# Patient Record
Sex: FEMALE | Race: WHITE | NOT HISPANIC OR LATINO | Employment: FULL TIME | URBAN - METROPOLITAN AREA
[De-identification: names, ages, dates, MRNs, and addresses within clinical notes are randomized per-mention and may not be internally consistent; named-entity substitution may affect disease eponyms.]

---

## 2020-07-17 ENCOUNTER — OFFICE VISIT (OUTPATIENT)
Dept: FAMILY MEDICINE CLINIC | Facility: CLINIC | Age: 50
End: 2020-07-17

## 2020-07-17 VITALS
HEIGHT: 63 IN | HEART RATE: 90 BPM | TEMPERATURE: 97.7 F | DIASTOLIC BLOOD PRESSURE: 88 MMHG | OXYGEN SATURATION: 99 % | WEIGHT: 263 LBS | SYSTOLIC BLOOD PRESSURE: 132 MMHG | BODY MASS INDEX: 46.6 KG/M2

## 2020-07-17 DIAGNOSIS — M51.16 LUMBAR DISC DISEASE WITH RADICULOPATHY: ICD-10-CM

## 2020-07-17 DIAGNOSIS — Z00.00 ANNUAL PHYSICAL EXAM: Primary | ICD-10-CM

## 2020-07-17 DIAGNOSIS — K21.9 GASTROESOPHAGEAL REFLUX DISEASE, ESOPHAGITIS PRESENCE NOT SPECIFIED: ICD-10-CM

## 2020-07-17 DIAGNOSIS — Z12.11 SCREENING FOR MALIGNANT NEOPLASM OF COLON: ICD-10-CM

## 2020-07-17 DIAGNOSIS — R73.9 HYPERGLYCEMIA: ICD-10-CM

## 2020-07-17 DIAGNOSIS — R53.83 FATIGUE, UNSPECIFIED TYPE: ICD-10-CM

## 2020-07-17 DIAGNOSIS — F41.9 ANXIETY: ICD-10-CM

## 2020-07-17 DIAGNOSIS — E55.9 VITAMIN D INSUFFICIENCY: ICD-10-CM

## 2020-07-17 DIAGNOSIS — E66.01 MORBID OBESITY WITH BMI OF 45.0-49.9, ADULT (HCC): ICD-10-CM

## 2020-07-17 DIAGNOSIS — Z11.59 NEED FOR HEPATITIS C SCREENING TEST: ICD-10-CM

## 2020-07-17 DIAGNOSIS — F33.42 RECURRENT MAJOR DEPRESSIVE DISORDER, IN FULL REMISSION (HCC): ICD-10-CM

## 2020-07-17 PROBLEM — R73.03 PREDIABETES: Status: ACTIVE | Noted: 2017-07-17

## 2020-07-17 PROBLEM — Z87.891 HISTORY OF TOBACCO USE: Status: ACTIVE | Noted: 2018-08-03

## 2020-07-17 PROBLEM — M54.50 LOW BACK PAIN: Status: ACTIVE | Noted: 2019-10-23

## 2020-07-17 PROCEDURE — 99386 PREV VISIT NEW AGE 40-64: CPT | Performed by: FAMILY MEDICINE

## 2020-07-17 RX ORDER — HYDROCHLOROTHIAZIDE 25 MG/1
TABLET ORAL
COMMUNITY
Start: 2020-05-19 | End: 2020-07-22 | Stop reason: SDUPTHER

## 2020-07-17 RX ORDER — BUPROPION HYDROCHLORIDE 300 MG/1
TABLET ORAL
COMMUNITY
Start: 2020-05-19 | End: 2020-07-22 | Stop reason: SDUPTHER

## 2020-07-17 RX ORDER — ALBUTEROL SULFATE 90 UG/1
AEROSOL, METERED RESPIRATORY (INHALATION)
COMMUNITY
Start: 2018-09-24 | End: 2020-07-29 | Stop reason: SDUPTHER

## 2020-07-17 RX ORDER — OMEPRAZOLE 20 MG/1
CAPSULE, DELAYED RELEASE ORAL
COMMUNITY
Start: 2018-04-29 | End: 2020-07-22 | Stop reason: SDUPTHER

## 2020-07-17 RX ORDER — FLUTICASONE PROPIONATE 50 MCG
2 SPRAY, SUSPENSION (ML) NASAL DAILY
COMMUNITY
Start: 2017-07-17

## 2020-07-17 RX ORDER — CETIRIZINE HYDROCHLORIDE 10 MG/1
10 TABLET ORAL DAILY
COMMUNITY

## 2020-07-17 RX ORDER — SPIRONOLACTONE 25 MG/1
TABLET ORAL
COMMUNITY
Start: 2020-05-19 | End: 2020-07-22

## 2020-07-17 RX ORDER — CYCLOBENZAPRINE HCL 10 MG
TABLET ORAL
COMMUNITY
Start: 2019-08-11 | End: 2020-07-22 | Stop reason: SDUPTHER

## 2020-07-17 RX ORDER — PHENDIMETRAZINE TARTRATE 105 MG/1
CAPSULE, EXTENDED RELEASE ORAL
COMMUNITY
End: 2021-12-01

## 2020-07-17 RX ORDER — FLUOXETINE HYDROCHLORIDE 20 MG/1
CAPSULE ORAL
COMMUNITY
Start: 2018-02-23 | End: 2020-07-22 | Stop reason: SDUPTHER

## 2020-07-17 RX ORDER — PSEUDOEPHEDRINE HCL 30 MG
60 TABLET ORAL
COMMUNITY

## 2020-07-17 RX ORDER — IBUPROFEN 400 MG/1
400 TABLET ORAL
COMMUNITY
End: 2020-07-22

## 2020-07-17 RX ORDER — FLUOXETINE HYDROCHLORIDE 20 MG/1
CAPSULE ORAL
COMMUNITY
Start: 2020-05-21 | End: 2020-07-17 | Stop reason: SDUPTHER

## 2020-07-17 RX ORDER — MELOXICAM 15 MG/1
TABLET ORAL
COMMUNITY
Start: 2020-05-19 | End: 2020-07-22 | Stop reason: SDUPTHER

## 2020-07-17 NOTE — PROGRESS NOTES
Subjective   Ce [facility] is a 49 y.o. female.     Chief Complaint   Patient presents with   • Annual Exam     new pt.    • Establish Care       History of Present Illness   Ce presents as a new patient for annual exam.   She has been off her meds for the past week.  She takes HCTZ and spironolactone for peripheral edema.  Denies history of HTN or heart disease. She reports history of SVT that was attributed to anxiety.   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   PMH also includes GERD, morbid obesity, cervical disc disease,   Takes Mobic for OA of the knees   Reports back injury in October- L4-L5 protruding disc causing radiculopathy, (last MRI 10/2019) no surgery, epidural was done and PT which helped. She still has some residual pain hip pain on left side aggravated by walking. Her activity has been limited since the back injury, she has gained weight- about 30-35 lbs. She is requesting a referral for pain management.   Her mother had a CVA 2 months ago, she has some aphasia and left sided weakness-patient lives next door and has moved in with her to help care for her.   She has a 22 y/o son who lives with her.   No other children. She is .   She is a former smoker- quit 5 years ago-7-8 years, no more than 1 ppd.   She rarely drinks alcohol. A couple beers a month at most.  She takes Prozac (for 3 years) and Wellbutrin (for 4-5 years) for depression- it is working very well for her.   She takes flexeril prn for low back pain.  She rarely uses proair for wheezing due to allergies. No history of asthma or COPD.   She takes phendimetrazine tartrate for weight loss but hasn't taken it for the past few weeks- she gets a script from [facility]- she plans to return and start taking it again.     Surgical history:  C3-C4 ACDF surgery    Ovarian cyst, hemorrhagic cyst  Uterine fibroids, ANH (in )  Appendectomy  Cholecytectomy    Last mammogram  10/2019  Has not had colonoscopy.    FH:  Mother- HTN, HLD, pre-diabetic, breast cancer age 43 yrs, she is alive and well.   Sister- HTN, HLD, diabetic  Father- Carotid artery disease, smoker      Past Medical History:   Diagnosis Date   • Allergic    • Anemia    • Anxiety    • Arthritis    • Back problem    • Depression    • GERD (gastroesophageal reflux disease)    • Knee problem    • Methicillin resistant Staph aureus culture positive    • Obesity    • Prediabetes    • Tendinitis of right elbow      Past Surgical History:   Procedure Laterality Date   • ADENOIDECTOMY     • APPENDECTOMY  2012   •  SECTION     • DIAGNOSTIC LAPAROSCOPY, SALPINGO OOPHORECTOMY LAPAROSCOPIC     • ENDOMETRIAL ABLATION  10/2012   • LAPAROSCOPIC CHOLECYSTECTOMY  2005   • LASIK     • TONSILLECTOMY     • VENTRAL HERNIA REPAIR  2016     Social History     Tobacco Use   • Smoking status: Former Smoker     Types: Cigarettes     Last attempt to quit: 2015     Years since quittin.5   • Smokeless tobacco: Never Used   Substance Use Topics   • Alcohol use: Yes     Comment: rarely   • Drug use: Never     Family History   Problem Relation Age of Onset   • Stroke Mother    • Hypertension Mother    • Hyperlipidemia Mother    • Cancer Mother    • Diabetes Mother    • Mental illness Mother    • Depression Mother    • Hypertension Father    • Hyperlipidemia Father    • Drug abuse Sister    • Depression Sister    • Mental illness Sister    • ADD / ADHD Son    • Asthma Son    • Heart disease Maternal Grandmother    • Colon cancer Maternal Grandfather    • Throat cancer Paternal Grandfather        Review of Systems   Constitutional: Negative for activity change, appetite change, chills, fever, unexpected weight gain and unexpected weight loss.   Respiratory: Negative for cough, chest tightness and shortness of breath.    Cardiovascular: Negative for chest pain, palpitations and leg swelling.   Gastrointestinal:  "Positive for GERD (occasionally). Negative for abdominal pain, constipation, diarrhea, nausea and vomiting.   Endocrine: Negative for polydipsia and polyuria.   Genitourinary: Negative for difficulty urinating.   Musculoskeletal: Positive for arthralgias and back pain. Negative for neck pain and neck stiffness.   Neurological: Negative for dizziness and headache.   Psychiatric/Behavioral: Negative for sleep disturbance and depressed mood. The patient is not nervous/anxious.        Objective   /88   Pulse 90   Temp 97.7 °F (36.5 °C) (Temporal)   Ht 160 cm (63\")   Wt 119 kg (263 lb)   SpO2 99%   BMI 46.59 kg/m²     Physical Exam   Constitutional: She appears well-developed and well-nourished. No distress.   Pleasant, morbidly obese female.    HENT:   Head: Normocephalic.   Right Ear: External ear normal.   Left Ear: External ear normal.   Mouth/Throat: Oropharynx is clear and moist.   Eyes: Pupils are equal, round, and reactive to light. Conjunctivae and EOM are normal.   Neck: Normal range of motion. Neck supple.   Cardiovascular: Normal rate, regular rhythm, normal heart sounds and intact distal pulses.   Pulmonary/Chest: Effort normal and breath sounds normal. No respiratory distress. She has no wheezes. She has no rales.   Abdominal: Soft. Bowel sounds are normal. She exhibits no distension. There is no tenderness. There is no rebound and no guarding.   Musculoskeletal: Normal range of motion.   Lymphadenopathy:     She has no cervical adenopathy.   Neurological: She is alert.   Skin: Skin is warm and dry. Capillary refill takes less than 2 seconds.   Psychiatric: She has a normal mood and affect.   Vitals reviewed.      Procedures    Assessment/Plan   Ce was seen today for annual exam and establish care.    Diagnoses and all orders for this visit:    Annual physical exam  -     Comprehensive Metabolic Panel  -     Lipid Panel    Hyperglycemia  -     Hemoglobin A1c    Need for hepatitis C " screening test  -     Hepatitis C Antibody    Vitamin D insufficiency  -     Vitamin D 25 Hydroxy    Fatigue, unspecified type  -     CBC & Differential    Screening for malignant neoplasm of colon  -     Ambulatory Referral For Screening Colonoscopy    Morbid obesity with BMI of 45.0-49.9, adult (CMS/[facility])    Gastroesophageal reflux disease, esophagitis presence not specified  -     omeprazole (priLOSEC) 20 MG capsule; Take 1 capsule by mouth Daily.    Anxiety  -     FLUoxetine (PROzac) 20 MG capsule; Take 1 capsule by mouth Daily.    Recurrent major depressive disorder, in full remission (CMS/[facility])  -     FLUoxetine (PROzac) 20 MG capsule; Take 1 capsule by mouth Daily.  -     buPROPion XL (WELLBUTRIN XL) 300 MG 24 hr tablet; Take 1 tablet by mouth Every Morning.    Lumbar disc disease with radiculopathy  -     Ambulatory Referral to Pain Management    Other orders  -     hydroCHLOROthiazide (HYDRODIURIL) 25 MG tablet; Take 1 tablet by mouth Daily.  -     cyclobenzaprine (FLEXERIL) 10 MG tablet; Take 1 tablet by mouth 3 (Three) Times a Day As Needed for Muscle Spasms.    Ce is a very pleasant 50 y/o F with history of GERD, morbid obesity, peripheral edema, anxiety and depression, cervical disc disease, lumbar disc disease with radiculopathy, OA of knees bilaterally.   She appears to be doing well today.  She plans to continue with weight loss program.  Needs all Rx's refilled. Recommended she stop spironolactone- I am not certain this is necessary.   Her BP is wnl today. She does not have peripheral edema on exam at this time.   Discussed appropriate screening and preventative measures including lipid and diabetic screening colon cancer screening, and importance of regular exercise with goal of weight loss.  Lab work ordered.  Colonoscopy ordered.  Referral placed for pain management.   Recommend regular exercise and healthy diet choices- limit saturated fats, carbs, starches, and sugars.   Return to clinic as  needed and for routine annual exam.       Patient Instructions    Lab work ordered- will contact you with results.  Colonoscopy ordered- office will call to schedule.  Referral placed for pain management.   Recommend regular exercise- at least 5 days a week 30 min/day, and healthy diet choices- limit saturated fats, carbs, starches, and sugars.   Return to clinic as needed and for routine annual exam.

## 2020-07-17 NOTE — PATIENT INSTRUCTIONS
Patient Instructions    Lab work ordered- will contact you with results.  Colonoscopy ordered- office will call to schedule.  Recommend regular exercise- at least 5 days a week 30 min/day, and healthy diet choices- limit saturated fats, carbs, starches, and sugars.   Return to clinic as needed and for routine annual exam.

## 2020-07-18 LAB
25(OH)D3+25(OH)D2 SERPL-MCNC: 22 NG/ML (ref 30–100)
ALBUMIN SERPL-MCNC: 4.3 G/DL (ref 3.5–5.2)
ALBUMIN/GLOB SERPL: 1.9 G/DL
ALP SERPL-CCNC: 72 U/L (ref 39–117)
ALT SERPL-CCNC: 26 U/L (ref 1–33)
AST SERPL-CCNC: 17 U/L (ref 1–32)
BASOPHILS # BLD AUTO: 0.05 10*3/MM3 (ref 0–0.2)
BASOPHILS NFR BLD AUTO: 0.8 % (ref 0–1.5)
BILIRUB SERPL-MCNC: 0.4 MG/DL (ref 0–1.2)
BUN SERPL-MCNC: 21 MG/DL (ref 6–20)
BUN/CREAT SERPL: 25 (ref 7–25)
CALCIUM SERPL-MCNC: 9.3 MG/DL (ref 8.6–10.5)
CHLORIDE SERPL-SCNC: 103 MMOL/L (ref 98–107)
CHOLEST SERPL-MCNC: 219 MG/DL (ref 0–200)
CO2 SERPL-SCNC: 20.6 MMOL/L (ref 22–29)
CREAT SERPL-MCNC: 0.84 MG/DL (ref 0.57–1)
EOSINOPHIL # BLD AUTO: 0.31 10*3/MM3 (ref 0–0.4)
EOSINOPHIL NFR BLD AUTO: 4.9 % (ref 0.3–6.2)
ERYTHROCYTE [DISTWIDTH] IN BLOOD BY AUTOMATED COUNT: 13.6 % (ref 12.3–15.4)
GLOBULIN SER CALC-MCNC: 2.3 GM/DL
GLUCOSE SERPL-MCNC: 97 MG/DL (ref 65–99)
HBA1C MFR BLD: 6.23 % (ref 4.8–5.6)
HCT VFR BLD AUTO: 39.5 % (ref 34–46.6)
HCV AB S/CO SERPL IA: <0.1 S/CO RATIO (ref 0–0.9)
HDLC SERPL-MCNC: 69 MG/DL (ref 40–60)
HGB BLD-MCNC: 12.9 G/DL (ref 12–15.9)
IMM GRANULOCYTES # BLD AUTO: 0.01 10*3/MM3 (ref 0–0.05)
IMM GRANULOCYTES NFR BLD AUTO: 0.2 % (ref 0–0.5)
LDLC SERPL CALC-MCNC: 125 MG/DL (ref 0–100)
LYMPHOCYTES # BLD AUTO: 2.39 10*3/MM3 (ref 0.7–3.1)
LYMPHOCYTES NFR BLD AUTO: 37.6 % (ref 19.6–45.3)
MCH RBC QN AUTO: 26 PG (ref 26.6–33)
MCHC RBC AUTO-ENTMCNC: 32.7 G/DL (ref 31.5–35.7)
MCV RBC AUTO: 79.6 FL (ref 79–97)
MONOCYTES # BLD AUTO: 0.38 10*3/MM3 (ref 0.1–0.9)
MONOCYTES NFR BLD AUTO: 6 % (ref 5–12)
NEUTROPHILS # BLD AUTO: 3.22 10*3/MM3 (ref 1.7–7)
NEUTROPHILS NFR BLD AUTO: 50.5 % (ref 42.7–76)
NRBC BLD AUTO-RTO: 0 /100 WBC (ref 0–0.2)
PLATELET # BLD AUTO: 361 10*3/MM3 (ref 140–450)
POTASSIUM SERPL-SCNC: 4.3 MMOL/L (ref 3.5–5.2)
PROT SERPL-MCNC: 6.6 G/DL (ref 6–8.5)
RBC # BLD AUTO: 4.96 10*6/MM3 (ref 3.77–5.28)
SODIUM SERPL-SCNC: 137 MMOL/L (ref 136–145)
TRIGL SERPL-MCNC: 125 MG/DL (ref 0–150)
VLDLC SERPL CALC-MCNC: 25 MG/DL
WBC # BLD AUTO: 6.36 10*3/MM3 (ref 3.4–10.8)

## 2020-07-20 RX ORDER — OMEPRAZOLE 20 MG/1
20 CAPSULE, DELAYED RELEASE ORAL DAILY
Qty: 90 CAPSULE | Refills: 1 | Status: CANCELLED | OUTPATIENT
Start: 2020-07-20

## 2020-07-20 RX ORDER — CYCLOBENZAPRINE HCL 10 MG
10 TABLET ORAL 2 TIMES DAILY PRN
Qty: 60 TABLET | Refills: 1 | Status: CANCELLED | OUTPATIENT
Start: 2020-07-20

## 2020-07-20 RX ORDER — MELOXICAM 15 MG/1
15 TABLET ORAL DAILY
Qty: 90 TABLET | Refills: 1 | Status: CANCELLED | OUTPATIENT
Start: 2020-07-20

## 2020-07-20 RX ORDER — FLUOXETINE HYDROCHLORIDE 20 MG/1
20 CAPSULE ORAL DAILY
Qty: 90 CAPSULE | Refills: 1 | Status: CANCELLED | OUTPATIENT
Start: 2020-07-20

## 2020-07-20 NOTE — TELEPHONE ENCOUNTER
PATIENT IS CALLING NEEDING A MEDICATION REFILL ON THESE MEDICATIONS.      PATIENT IS OUT OF ALL THESE MEDICAITONS      buPROPion XL (WELLBUTRIN XL) 300 MG 24 hr tablet    cyclobenzaprine (FLEXERIL) 10 MG tablet    FLUoxetine (PROzac) 20 MG capsule    meloxicam (MOBIC) 15 MG tablet    omeprazole (priLOSEC) 20 MG capsule    Phendimetrazine Tartrate  MG capsule sustained-release 24 hr    spironolactone (ALDACTONE) 25 MG tablet      PHARMACY:  [facility]   P: [phone]   F: [phone]   Address     [address] [place] [address]         PLEASE CONTACT PATIENT @[phone]

## 2020-07-22 PROBLEM — F41.9 ANXIETY: Status: ACTIVE | Noted: 2020-07-22

## 2020-07-22 PROBLEM — E66.01 MORBID OBESITY WITH BMI OF 45.0-49.9, ADULT: Status: ACTIVE | Noted: 2017-07-17

## 2020-07-22 PROBLEM — F33.42 RECURRENT MAJOR DEPRESSIVE DISORDER, IN FULL REMISSION: Status: ACTIVE | Noted: 2020-07-22

## 2020-07-22 RX ORDER — MELOXICAM 15 MG/1
15 TABLET ORAL DAILY
Qty: 30 TABLET | Refills: 2 | Status: SHIPPED | OUTPATIENT
Start: 2020-07-22 | End: 2020-08-18

## 2020-07-22 RX ORDER — BUPROPION HYDROCHLORIDE 300 MG/1
300 TABLET ORAL EVERY MORNING
Qty: 30 TABLET | Refills: 5 | Status: SHIPPED | OUTPATIENT
Start: 2020-07-22 | End: 2021-02-26 | Stop reason: SDUPTHER

## 2020-07-22 RX ORDER — OMEPRAZOLE 20 MG/1
20 CAPSULE, DELAYED RELEASE ORAL DAILY
Qty: 30 CAPSULE | Refills: 5 | Status: SHIPPED | OUTPATIENT
Start: 2020-07-22 | End: 2021-02-26

## 2020-07-22 RX ORDER — HYDROCHLOROTHIAZIDE 25 MG/1
25 TABLET ORAL DAILY
Qty: 30 TABLET | Refills: 5 | Status: SHIPPED | OUTPATIENT
Start: 2020-07-22 | End: 2021-02-26 | Stop reason: SDUPTHER

## 2020-07-22 RX ORDER — FLUOXETINE HYDROCHLORIDE 20 MG/1
20 CAPSULE ORAL DAILY
Qty: 30 CAPSULE | Refills: 5 | Status: SHIPPED | OUTPATIENT
Start: 2020-07-22 | End: 2021-02-26 | Stop reason: SDUPTHER

## 2020-07-22 RX ORDER — CYCLOBENZAPRINE HCL 10 MG
10 TABLET ORAL 3 TIMES DAILY PRN
Qty: 90 TABLET | Refills: 1 | Status: SHIPPED | OUTPATIENT
Start: 2020-07-22 | End: 2021-09-20 | Stop reason: SDUPTHER

## 2020-07-23 ENCOUNTER — TELEPHONE (OUTPATIENT)
Dept: FAMILY MEDICINE CLINIC | Facility: CLINIC | Age: 50
End: 2020-07-23

## 2020-07-23 RX ORDER — PHENDIMETRAZINE TARTRATE 105 MG/1
1 CAPSULE, EXTENDED RELEASE ORAL DAILY
Qty: 90 EACH | Refills: 1 | OUTPATIENT
Start: 2020-07-23

## 2020-07-23 RX ORDER — SPIRONOLACTONE 25 MG/1
25 TABLET ORAL DAILY
Qty: 90 TABLET | Refills: 1 | OUTPATIENT
Start: 2020-07-23

## 2020-07-23 NOTE — TELEPHONE ENCOUNTER
Mary,    Could you please contact this patient?   I refilled all of these patient's medications except for the spironolactone- I stopped that at her appointment which I thought I mentioned to her.  I do not prescribe the phendimetrazine tartrate- she will have to get that from the weight loss program. Please let me know if she has any questions or concerns. Thank you!

## 2020-07-29 RX ORDER — ALBUTEROL SULFATE 90 UG/1
1 AEROSOL, METERED RESPIRATORY (INHALATION) EVERY 4 HOURS PRN
Qty: 8.5 G | Refills: 3 | Status: SHIPPED | OUTPATIENT
Start: 2020-07-29 | End: 2021-02-26 | Stop reason: SDUPTHER

## 2020-07-29 NOTE — PROGRESS NOTES
Discussed lab results with patient. Labs look great overall with exception of mildly elevated cholesterol and hyperglycemia with Hgb A1c of 6.2, and vitamin D insufficiency. Recommended regular exercise with goal of weight loss (as planned), limiting saturated fats, carbs, starches, and sugars in the diet. Recommended daily Vitamin D3 supplement of 8825-4920 IU's.   Will plan to repeat Hgb A1c at her follow up in 6 months. She is agreeable with this plan.

## 2020-08-10 DIAGNOSIS — R40.0 DAYTIME SOMNOLENCE: ICD-10-CM

## 2020-08-10 DIAGNOSIS — G47.00 FREQUENT NOCTURNAL AWAKENING: Primary | ICD-10-CM

## 2020-08-11 ENCOUNTER — TELEPHONE (OUTPATIENT)
Dept: FAMILY MEDICINE CLINIC | Facility: CLINIC | Age: 50
End: 2020-08-11

## 2020-08-11 NOTE — TELEPHONE ENCOUNTER
PATIENT STATES THAT THE REFERRAL TO THE PAIN MANAGEMENT CLINIC DOES NOT TAKE [place] INSURANCE FOR THE INJECTIONS.  PATENT REQUESTED TO BE REFERRED TO [facility] AT [place].  PLEASE ADVISE    PATIENT CALL BACK #: [phone]

## 2020-08-13 DIAGNOSIS — M51.16 LUMBAR DISC DISEASE WITH RADICULOPATHY: Primary | ICD-10-CM

## 2020-08-19 RX ORDER — MELOXICAM 15 MG/1
15 TABLET ORAL DAILY
Qty: 90 TABLET | Refills: 1 | Status: SHIPPED | OUTPATIENT
Start: 2020-08-19 | End: 2021-02-26 | Stop reason: SDUPTHER

## 2021-01-04 ENCOUNTER — IMMUNIZATION (OUTPATIENT)
Dept: VACCINE CLINIC | Facility: HOSPITAL | Age: 51
End: 2021-01-04

## 2021-01-04 PROCEDURE — 91300 HC SARSCOV02 VAC 30MCG/0.3ML IM: CPT | Performed by: INTERNAL MEDICINE

## 2021-01-04 PROCEDURE — 0001A: CPT | Performed by: INTERNAL MEDICINE

## 2021-01-26 ENCOUNTER — OFFICE VISIT (OUTPATIENT)
Dept: ORTHOPEDIC SURGERY | Facility: CLINIC | Age: 51
End: 2021-01-26

## 2021-01-26 ENCOUNTER — TELEPHONE (OUTPATIENT)
Dept: FAMILY MEDICINE CLINIC | Facility: CLINIC | Age: 51
End: 2021-01-26

## 2021-01-26 ENCOUNTER — IMMUNIZATION (OUTPATIENT)
Dept: VACCINE CLINIC | Facility: HOSPITAL | Age: 51
End: 2021-01-26

## 2021-01-26 VITALS — TEMPERATURE: 97.9 F | BODY MASS INDEX: 40.63 KG/M2 | HEIGHT: 64 IN | WEIGHT: 238 LBS

## 2021-01-26 DIAGNOSIS — M70.62 TROCHANTERIC BURSITIS OF LEFT HIP: ICD-10-CM

## 2021-01-26 DIAGNOSIS — R52 PAIN: Primary | ICD-10-CM

## 2021-01-26 DIAGNOSIS — M17.12 PRIMARY OSTEOARTHRITIS OF LEFT KNEE: ICD-10-CM

## 2021-01-26 PROCEDURE — 73502 X-RAY EXAM HIP UNI 2-3 VIEWS: CPT | Performed by: NURSE PRACTITIONER

## 2021-01-26 PROCEDURE — 0002A: CPT | Performed by: INTERNAL MEDICINE

## 2021-01-26 PROCEDURE — 20610 DRAIN/INJ JOINT/BURSA W/O US: CPT | Performed by: NURSE PRACTITIONER

## 2021-01-26 PROCEDURE — 73562 X-RAY EXAM OF KNEE 3: CPT | Performed by: NURSE PRACTITIONER

## 2021-01-26 PROCEDURE — 99203 OFFICE O/P NEW LOW 30 MIN: CPT | Performed by: NURSE PRACTITIONER

## 2021-01-26 PROCEDURE — 91300 HC SARSCOV02 VAC 30MCG/0.3ML IM: CPT | Performed by: INTERNAL MEDICINE

## 2021-01-26 RX ORDER — METHYLPREDNISOLONE ACETATE 80 MG/ML
80 INJECTION, SUSPENSION INTRA-ARTICULAR; INTRALESIONAL; INTRAMUSCULAR; SOFT TISSUE
Status: COMPLETED | OUTPATIENT
Start: 2021-01-26 | End: 2021-01-26

## 2021-01-26 RX ADMIN — METHYLPREDNISOLONE ACETATE 80 MG: 80 INJECTION, SUSPENSION INTRA-ARTICULAR; INTRALESIONAL; INTRAMUSCULAR; SOFT TISSUE at 15:50

## 2021-01-26 RX ADMIN — METHYLPREDNISOLONE ACETATE 80 MG: 80 INJECTION, SUSPENSION INTRA-ARTICULAR; INTRALESIONAL; INTRAMUSCULAR; SOFT TISSUE at 15:49

## 2021-01-26 NOTE — TELEPHONE ENCOUNTER
PATIENT CALLED STATING THAT THE [facility] PH[facility] FAXED OVER A REFILL REQUEST IN THE AM ON 1/26/2021. PATIENT WOULD LIKE VERIFICATION THAT IT WAS RECEIVED AND IT WILL BE FILLED.     PATIENT IS OFFBOARDING DUE TO DR HILL AND IS ESTABLISHING CARE AT [facility] OFFICE.     PLEASE ADVISE.

## 2021-01-26 NOTE — PROGRESS NOTES
Patient: Ce Lebron  YOB: 1970 50 y.o. female  Medical Record Number: [id number]    Chief Complaints: Left knee pain    History of Present Illness:Ce Lebron is a 50 y.o. female who presents with complaints of bilateral knee and left lateral hip pain.  Patient reports she has had pain off and on for many years in her knees but the left hip started bothering her a few weeks ago.  She denies any injury.  Describes the knees and hip pain as a moderate sometimes severe constant ache worse with standing walking, better with rest.    Allergies:   Allergies   Allergen Reactions   • Sulfa Antibiotics Itching   • Adhesive Tape Swelling     Gets some redness and swelling   • Latex Dermatitis, Itching, Rash and Swelling       Medications:   Current Outpatient Medications   Medication Sig Dispense Refill   • albuterol sulfate HFA (ProAir HFA) 108 (90 Base) MCG/ACT inhaler Inhale 1 puff Every 4 (Four) Hours As Needed for Wheezing or Shortness of Air. 8.5 g 3   • buPROPion XL (WELLBUTRIN XL) 300 MG 24 hr tablet Take 1 tablet by mouth Every Morning. 30 tablet 5   • cetirizine (zyrTEC) 10 MG tablet Take 10 mg by mouth Daily.     • Cyanocobalamin (VITAMIN B 12 PO) Take 1,000 mcg by mouth.     • cyclobenzaprine (FLEXERIL) 10 MG tablet Take 1 tablet by mouth 3 (Three) Times a Day As Needed for Muscle Spasms. 90 tablet 1   • FLUoxetine (PROzac) 20 MG capsule Take 1 capsule by mouth Daily. 30 capsule 5   • fluticasone (FLONASE) 50 MCG/ACT nasal spray 2 sprays into the nostril(s) as directed by provider Daily.     • hydroCHLOROthiazide (HYDRODIURIL) 25 MG tablet Take 1 tablet by mouth Daily. 30 tablet 5   • meloxicam (MOBIC) 15 MG tablet Take 1 tablet by mouth Daily. 90 tablet 1   • omeprazole (priLOSEC) 20 MG capsule Take 1 capsule by mouth Daily. 30 capsule 5   • Phendimetrazine Tartrate  MG capsule sustained-release 24 hr phendimetrazine tartrate  mg capsule,extended release   Take 1 capsule every day by  "oral route.     • pseudoephedrine (SUDAFED) 30 MG tablet Take 60 mg by mouth.       No current facility-administered medications for this visit.          The following portions of the patient's history were reviewed and updated as appropriate: allergies, current medications, past family history, past medical history, past social history, past surgical history and problem list.    Review of Systems:   A 14 point review of systems was performed. All systems negative except pertinent positives/negative listed in HPI above    Physical Exam:   Vitals:    01/26/21 1538   Temp: 97.9 °F (36.6 °C)   Weight: 108 kg (238 lb)   Height: 162.6 cm (64\")   PainSc:   1       General: A and O x 3, ASA, NAD    SCLERA:    Normal    DENTITION:   Normal  Skin clear no unusual lesions noted  Bilateral knees patient has no appreciable effusion 110 degrees flexion neutral extension with a positive Ignacio negative Lockman calf soft and nontender  Left hip patient is very tender palpation of her left hip greater trochanteric bursa she otherwise has normal internal X rotation with a negative Stinchfield negative logroll       Radiology:  Xrays 3views (ap,lateral, sunrise) bilateral knees ordered and reviewed today secondary to pain show bone-on-bone end-stage osteoarthritis.  In addition 2 views of the left hip ordered and reviewed today secondary to pain show minimal arthritic changes.  No compared to views available    Assessment/Plan: Osteoarthritis bilateral knees  Left greater trochanteric bursitis    Patient discussed options, she would like to proceed with left bursa and knee injection, she was referred to outpatient physical therapy, and I will see her back in 6 weeks for her right knee if needed    Large Joint Arthrocentesis: L greater trochanteric bursa  Date/Time: 1/26/2021 3:49 PM  Consent given by: patient  Site marked: site marked  Timeout: Immediately prior to procedure a time out was called to verify the correct patient, " procedure, equipment, support staff and site/side marked as required   Supporting Documentation  Indications: pain   Procedure Details  Location: hip - L greater trochanteric bursa  Preparation: Patient was prepped and draped in the usual sterile fashion  Needle gauge: 21g.  Approach: posterior  Medications administered: 2 mL lidocaine (cardiac); 80 mg methylPREDNISolone acetate 80 MG/ML  Patient tolerance: patient tolerated the procedure well with no immediate complications    Large Joint Arthrocentesis: L knee  Date/Time: 1/26/2021 3:50 PM  Consent given by: patient  Site marked: site marked  Timeout: Immediately prior to procedure a time out was called to verify the correct patient, procedure, equipment, support staff and site/side marked as required   Supporting Documentation  Indications: pain   Procedure Details  Location: knee - L knee  Preparation: Patient was prepped and draped in the usual sterile fashion  Needle gauge: 21g.  Approach: lateral  Medications administered: 2 mL lidocaine (cardiac); 80 mg methylPREDNISolone acetate 80 MG/ML  Patient tolerance: patient tolerated the procedure well with no immediate complications            Ana Adkins, APRN    1/26/2021

## 2021-01-26 NOTE — TELEPHONE ENCOUNTER
I did not see a faxed request.  I left a VM for the patient informing her of this. I advised her that she could give us a call to let us know which medication she is needing if she would like.  Informed patient that I would also continue to keep an eye out for a fax from the pharmacy.

## 2021-02-11 ENCOUNTER — HOSPITAL ENCOUNTER (OUTPATIENT)
Dept: PHYSICAL THERAPY | Facility: HOSPITAL | Age: 51
Setting detail: THERAPIES SERIES
Discharge: HOME | End: 2021-02-11

## 2021-02-11 DIAGNOSIS — M25.559 HIP PAIN: ICD-10-CM

## 2021-02-11 DIAGNOSIS — R26.2 DIFFICULTY WALKING: Primary | ICD-10-CM

## 2021-02-11 DIAGNOSIS — M17.0 OSTEOARTHRITIS OF BOTH KNEES, UNSPECIFIED OSTEOARTHRITIS TYPE: ICD-10-CM

## 2021-02-11 PROCEDURE — 97162 PT EVAL MOD COMPLEX 30 MIN: CPT

## 2021-02-11 PROCEDURE — 97110 THERAPEUTIC EXERCISES: CPT

## 2021-02-11 NOTE — THERAPY EVALUATION
Outpatient Physical Therapy Ortho Initial Evaluation  [facility]     Patient Name: Ce Lebron  : 1970  MRN: [id number]  Today's Date: 2021      Visit Date: 2021    Patient Active Problem List   Diagnosis   • Low back pain   • Morbid obesity with BMI of 45.0-49.9, adult (CMS/[facility])   • Depression   • Gastroesophageal reflux disease   • History of tobacco use   • Prediabetes   • Vitamin D deficiency   • Anxiety   • Recurrent major depressive disorder, in full remission (CMS/[facility])        Past Medical History:   Diagnosis Date   • Allergic    • Anemia    • Anxiety    • Arthritis    • Back problem    • Depression    • GERD (gastroesophageal reflux disease)    • Knee problem    • Methicillin resistant Staph aureus culture positive    • Obesity    • Prediabetes    • Tendinitis of right elbow         Past Surgical History:   Procedure Laterality Date   • ADENOIDECTOMY     • APPENDECTOMY  2012   •  SECTION     • DIAGNOSTIC LAPAROSCOPY, SALPINGO OOPHORECTOMY LAPAROSCOPIC     • ENDOMETRIAL ABLATION  10/2012   • LAPAROSCOPIC CHOLECYSTECTOMY  2005   • LASIK     • TONSILLECTOMY     • VENTRAL HERNIA REPAIR  2016       Visit Dx:     ICD-10-CM ICD-9-CM   1. Difficulty walking  R26.2 719.7   2. Osteoarthritis of both knees, unspecified osteoarthritis type  M17.0 715.96   3. Hip pain  M25.559 719.45         Patient History     Row Name 21 1600             History    Chief Complaint  Pain  -      Type of Pain  Hip pain;Knee pain  -      Date Current Problem(s) Began  -- chronic   -      Brief Description of Current Complaint  Pt. Presents to clinic with reports of chronic B knee pain that has been ongoing for many years. Pt. Reports injuring calf ~3 years ago and was in a boot. Pt. Is primarily complaining L hip pain at this time but has knee pain constantly. Pt. Has received cortisone injections in L knee and L hip 2021 that has helped minimally to  date. Pt. States she has difficulty walking any distance and still has difficulty rising up on toes. Additionally, pt. Has history of low back injury but does not give her much trouble. At this time, pt. Is trying to avoid surgery (TKA) as she has responded well to cortisone and gel injections in the past.   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  orthopedic  -      What clinical tests have you had for this problem?  X-ray  -         Pain     Pain Location  Knee;Hip  -      Pain at Present  0 hip feels tired 1/10  -      Pain at Worst  4 after walking  -      Pain Frequency  Intermittent  -      Pain Description  Sharp;Aching on inside of knees while walking; ache at hip  -      What Performance Factors Make the Current Problem(s) WORSE?  walking, stairs (step-to at times)  -      What Performance Factors Make the Current Problem(s) BETTER?  injections  -      Tolerance Time- Standing  20 minutes  -      Tolerance Time- Walking  5-10 minutes  -      Is your sleep disturbed?  No  -      Difficulties at work?  no  -      Difficulties with ADL's?  yes  -      Difficulties with recreational activities?  yes  -         Fall Risk Assessment    Any falls in the past year:  No  -         Services    Prior Rehab/Home Health Experiences  Yes  -         Daily Activities    Primary Language  English  -      Are you able to read  Yes  -      Are you able to write  Yes  -      Does patient have problems with the following?  None  -      Pt Participated in POC and Goals  Yes  -         Safety    Are you being hurt, hit, or frightened by anyone at home or in your life?  No  -      Are you being neglected by a  caregiver  No  -MH      Have you had any of the following issues with  Depression  -MH        User Key  (r) = Recorded By, (t) = Taken By, (c) = Cosigned By    Initials Name Provider Type    Joy Kaminski, PORTIA Physical Therapist          PT Ortho     Row Name 02/11/21 1600       Posture/Observations    Alignment Options  Rounded shoulders;Iliac crests  -MH    Rounded Shoulders  Moderate  -MH    Iliac crests  Normal  -MH       Quarter Clearing    Quarter Clearing  Lower Quarter Clearing  -MH       Myotomal Screen- Lower Quarter Clearing    Hip flexion (L2)  Left:;4 (Good);Right:;4+ (Good +)  -MH    Knee extension (L3)  Left:;4+ (Good +);Right:;5 (Normal)  -    Ankle DF (L4)  Bilateral:;5 (Normal)  -    Knee flexion (S2)  Left:;4 (Good);Right:;4+ (Good +)  -       Lumbar ROM Screen- Lower Quarter Clearing    Lumbar Flexion  Normal  -MH    Lumbar Extension  Normal  -    Lumbar Lateral Flexion  Normal  -    Lumbar Rotation  Normal  -       Special Tests/Palpation    Special Tests/Palpation  Knee;Hip  -       Hip/Thigh Palpation    Gluteus Medius  Guarded/taut;Tender  -    Piriformis  Tender;Guarded/taut  -    Biceps Formoris  Guarded/taut  -    Semimembranosis  Guarded/taut  -MH    Semitendinosis  Guarded/taut  -MH       Hip Special Tests    MARIA (hip vs SI pathology)  Bilateral:;Negative  -    Ely’s test (rectus femoris tightness)  Bilateral:;Positive  -    FAIR test (piriformis syndrome)  Bilateral:;Positive  -       Knee Palpation    Knee Palpation?  Yes  -    Posterior Joint Line  Guarded/taut  -MH       Patellar Accessory Motions    Superior glide  Right:;Left:;WNL  -MH    Inferior glide  Right:;Left:;WNL  -MH    Medial glide  Right:;Left:;WNL  -MH    Lateral glide  Right:;Left:;WNL  -MH       General ROM    RT Lower Ext  Rt Hip Flexion;Rt Knee Extension/Flexion  -    LT Lower Ext  Lt Hip Flexion;Lt Knee Extension/Flexion  -       Right Lower Ext    Rt Hip Flexion AROM  0-115   -MH    Rt Knee Extension/Flexion AROM  0-3-128  -MH       Left Lower Ext    Lt Hip Flexion AROM  0-110  -MH    Lt Knee Extension/Flexion AROM  0-5-130  -MH       MMT (Manual Muscle Testing)    Rt Lower Ext  Rt Hip Extension;Rt Hip ABduction  -MH    Lt Lower Ext  Lt Hip Extension;Lt Hip ABduction  -MH       MMT Right Lower Ext    Rt Hip Extension MMT, Gross Movement  (4-/5) good minus  -MH    Rt Hip ABduction MMT, Gross Movement  (4-/5) good minus  -MH       MMT Left Lower Ext    Lt Hip Extension MMT, Gross Movement  (4-/5) good minus  -MH    Lt Hip ABduction MMT, Gross Movement  (3+/5) fair plus  -       Sensation    Sensation WNL?  WNL  -       Flexibility    Flexibility Tested?  Lower Extremity  -       Lower Extremity Flexibility    Hamstrings  Bilateral:;Moderately limited  -    Gastrocnemius  Bilateral:;Mildly limited  -       Gait/Stairs (Locomotion)    Negotiation (Stairs)  -- repotrs step-to ascendign  -    Comment (Gait/Stairs)  mildly antalgic, decreased trunk rotation  -      User Key  (r) = Recorded By, (t) = Taken By, (c) = Cosigned By    Initials Name Provider Type     Joy Castaneda, PT Physical Therapist                      Therapy Education  Education Details: Educated on PT role and POC; discussed timeframe for healing/expected outcomes. HEP Access Code: [email or web address]  Given: HEP, Symptoms/condition management, Pain management, Posture/body mechanics  Program: New  How Provided: Verbal, Demonstration, Written  Provided to: Patient  Level of Understanding: Teach back education performed, Verbalized, Demonstrated     PT OP Goals     Row Name 02/11/21 1600          PT Short Term Goals    STG Date to Achieve  02/25/21  -     STG 1  Pt. Will be independent with initial HEP to improve self-management of condition.  -     STG 1 Progress  New  -     STG 2  Pt. will report ability to ambulate >/= 30 minutes without increase in pain to ease ability to complete ADLs  -     STG 2 Progress   New  [facility]        Long Term Goals    LTG Date to Achieve  03/14/21  -     LTG 1  Pt. Will be independent with advanced HEP to improve long-term management of condition and independence.  -     LTG 1 Progress  New  [facility]     LTG 2  Pt. Will score >/= 85% on KOS  (from 69% on initial evaluation) to indicate improved perception of disability.  -     LTG 2 Progress  New  -     LTG 3  Pt. will demonstrate improved B hip strength >/= 4+/5 to reduce undue strain at lateral hips and knees.  -     LTG 3 Progress  New  -     LTG 4  Pt. will begin performing tae-annmarie-do movements without increase in pain to progress toward return to sport/hobby.  -     LTG 4 Progress  New  [facility]        Time Calculation    PT Goal Re-Cert Due Date  05/12/21  -       User Key  (r) = Recorded By, (t) = Taken By, (c) = Cosigned By    Initials Name Provider Type     Joy Castaneda, PT Physical Therapist          PT Assessment/Plan     Row Name 02/11/21 1621          PT Assessment    Functional Limitations  Impaired gait;Impaired locomotion;Limitations in community activities;Performance in work activities  -     Impairments  Balance;Endurance;Gait;Impaired flexibility;Impaired muscle endurance;Impaired muscle length;Joint integrity;Joint mobility;Locomotion;Muscle strength;Pain;Poor body mechanics;Posture;Range of motion  -     Assessment Comments  Ce Lebron is a 50 y.o. year-old female referred to physical therapy for chronic B knee pain L>R. Pt. Reports knee pain has been chronic but primary complaint is now L hip which has been problematic since 2019 when she was in a walking boot due to torn gastroc. Pt. Has received L knee and hip cortisone injection which have been minimally effective to date. She presents with a evolving clinical presentation. She has comorbidities of increased BMI, previous gastroc tear, history of ACDF C5-6 and history of low back injury 1 year ago and personal factors of chronicity of pain that may  affect her progress in the plan of care. Self scored disability measure of KOS was a 69% (where 100% is no disability). She demonstrated impaired B hip strength, limited LE flexibility, tenderness to palpation and mildly antalgic gait pattern. Pt. Enjoys tae-annmarie-do but has not been able to participate in 1 year secondary to back injury and knee pain. Pt. Is eager to return to sport when appropriate. Signs and symptoms are consistent with referring diagnosis. She is appropriate for skilled therapy services at this time to address deficits and improve ease with ADLs such as walking and progress to return to hobbies such as tae-annmarie-do.  -     Please refer to paper survey for additional self-reported information  Yes  -MH     Rehab Potential  Good  -     Patient/caregiver participated in establishment of treatment plan and goals  Yes  -     Patient would benefit from skilled therapy intervention  Yes  -        PT Plan    PT Frequency  2x/week  -     Predicted Duration of Therapy Intervention (PT)  8 visits  -     Planned CPT's?  PT EVAL MOD COMPLELITY: 71000;PT RE-EVAL: 20269;PT THER PROC EA 15 MIN: 49913;PT THER ACT EA 15 MIN: 56757;PT MANUAL THERAPY EA 15 MIN: 38088;PT NEUROMUSC RE-EDUCATION EA 15 MIN: 88865;PT GAIT TRAINING EA 15 MIN: 20621;PT SELF CARE/HOME MGMT/TRAIN EA 15: 20224;PT HOT OR COLD PACK TREAT MCARE;PT ELECTRICAL STIM UNATTEND: ;PT ULTRASOUND EA 15 MIN: 66744;PT SELF CARE/MGMT/TRAIN 15 MIN: 98506;PT THER MASS EA 15 MIN: 02314  -     PT Plan Comments  Assess response to initial HEP (consider warm up NuStep, add TB clams, bridges) focus on B knee and hip girdle strengthening  -       User Key  (r) = Recorded By, (t) = Taken By, (c) = Cosigned By    Initials Name Provider Type     Joy Castaneda, PT Physical Therapist            OP Exercises     Row Name 02/11/21 1700             Total Minutes    94901 - PT Therapeutic Exercise Minutes  12  -         Exercise 1    Exercise Name  1  quad set  -MH      Cueing 1  Verbal  -MH      Reps 1  10  -MH      Time 1  5  -MH         Exercise 2    Exercise Name 2  S/L clam  -MH      Cueing 2  Verbal  -MH      Reps 2  8  -MH         Exercise 3    Exercise Name 3  piriformis/fig4 stretch  -MH      Cueing 3  Verbal  -MH      Reps 3  2  -MH      Time 3  20  -MH         Exercise 4    Exercise Name 4  hamstring stretch EOB  -MH      Cueing 4  Verbal  -MH      Reps 4  2  -MH      Time 4  20  -MH         Exercise 5    Exercise Name 5  standing gastroc stretch  -MH      Cueing 5  Verbal  -MH      Reps 5  2  -MH      Time 5  20  -MH        User Key  (r) = Recorded By, (t) = Taken By, (c) = Cosigned By    Initials Name Provider Type     Joy Castaneda, PT Physical Therapist                        Outcome Measure Options: Knee Outcome Score- ADL  Knee Outcome Score  Knee Outcome Score Comments: 55/80 69%      Time Calculation:     Start Time: 1622  Stop Time: 1702  Time Calculation (min): 40 min  Total Timed Code Minutes- PT: 12 minute(s)     Therapy Charges for Today     Code Description Service Date Service Provider Modifiers Qty    [id number]  PT THER PROC EA 15 MIN 2/11/2021 Joy Castaneda, PT GP 1    61434574478 HC PT EVAL MOD COMPLEXITY 2 2/11/2021 Joy Castaneda, PT GP 1          PT G-Codes  Outcome Measure Options: Knee Outcome Score- ADL         Joy Castaneda PT  2/11/2021

## 2021-02-26 ENCOUNTER — APPOINTMENT (OUTPATIENT)
Dept: PHYSICAL THERAPY | Facility: HOSPITAL | Age: 51
End: 2021-02-26

## 2021-02-26 ENCOUNTER — OFFICE VISIT (OUTPATIENT)
Dept: INTERNAL MEDICINE | Facility: CLINIC | Age: 51
End: 2021-02-26

## 2021-02-26 VITALS
RESPIRATION RATE: 18 BRPM | HEART RATE: 100 BPM | HEIGHT: 64 IN | TEMPERATURE: 96.9 F | BODY MASS INDEX: 42.17 KG/M2 | WEIGHT: 247 LBS | SYSTOLIC BLOOD PRESSURE: 142 MMHG | OXYGEN SATURATION: 97 % | DIASTOLIC BLOOD PRESSURE: 100 MMHG

## 2021-02-26 DIAGNOSIS — I10 ELEVATED BLOOD PRESSURE READING IN OFFICE WITH DIAGNOSIS OF HYPERTENSION: ICD-10-CM

## 2021-02-26 DIAGNOSIS — R73.03 PREDIABETES: ICD-10-CM

## 2021-02-26 DIAGNOSIS — E78.2 MODERATE MIXED HYPERLIPIDEMIA NOT REQUIRING STATIN THERAPY: ICD-10-CM

## 2021-02-26 DIAGNOSIS — E55.9 VITAMIN D DEFICIENCY: ICD-10-CM

## 2021-02-26 DIAGNOSIS — F33.42 RECURRENT MAJOR DEPRESSIVE DISORDER, IN FULL REMISSION (HCC): Primary | ICD-10-CM

## 2021-02-26 DIAGNOSIS — Z12.31 SCREENING MAMMOGRAM, ENCOUNTER FOR: ICD-10-CM

## 2021-02-26 DIAGNOSIS — Z12.11 ENCOUNTER FOR SCREENING COLONOSCOPY: ICD-10-CM

## 2021-02-26 DIAGNOSIS — F41.9 ANXIETY: ICD-10-CM

## 2021-02-26 DIAGNOSIS — E66.01 MORBID OBESITY WITH BMI OF 45.0-49.9, ADULT (HCC): ICD-10-CM

## 2021-02-26 DIAGNOSIS — R60.0 LOWER EXTREMITY EDEMA: ICD-10-CM

## 2021-02-26 DIAGNOSIS — K21.9 GASTROESOPHAGEAL REFLUX DISEASE, UNSPECIFIED WHETHER ESOPHAGITIS PRESENT: ICD-10-CM

## 2021-02-26 LAB
25(OH)D3+25(OH)D2 SERPL-MCNC: 35.5 NG/ML (ref 30–100)
ALBUMIN SERPL-MCNC: 4.3 G/DL (ref 3.5–5.2)
ALBUMIN/GLOB SERPL: 1.8 G/DL
ALP SERPL-CCNC: 93 U/L (ref 39–117)
ALT SERPL-CCNC: 18 U/L (ref 1–33)
AST SERPL-CCNC: 12 U/L (ref 1–32)
BASOPHILS # BLD AUTO: 0.04 10*3/MM3 (ref 0–0.2)
BASOPHILS NFR BLD AUTO: 0.6 % (ref 0–1.5)
BILIRUB SERPL-MCNC: 0.4 MG/DL (ref 0–1.2)
BUN SERPL-MCNC: 21 MG/DL (ref 6–20)
BUN/CREAT SERPL: 25 (ref 7–25)
CALCIUM SERPL-MCNC: 9.6 MG/DL (ref 8.6–10.5)
CHLORIDE SERPL-SCNC: 105 MMOL/L (ref 98–107)
CHOLEST SERPL-MCNC: 212 MG/DL (ref 0–200)
CO2 SERPL-SCNC: 26.4 MMOL/L (ref 22–29)
CREAT SERPL-MCNC: 0.84 MG/DL (ref 0.57–1)
EOSINOPHIL # BLD AUTO: 0.28 10*3/MM3 (ref 0–0.4)
EOSINOPHIL NFR BLD AUTO: 4.1 % (ref 0.3–6.2)
ERYTHROCYTE [DISTWIDTH] IN BLOOD BY AUTOMATED COUNT: 14.1 % (ref 12.3–15.4)
GLOBULIN SER CALC-MCNC: 2.4 GM/DL
GLUCOSE SERPL-MCNC: 101 MG/DL (ref 65–99)
HBA1C MFR BLD: 6.2 % (ref 4.8–5.6)
HCT VFR BLD AUTO: 41.9 % (ref 34–46.6)
HDLC SERPL-MCNC: 81 MG/DL (ref 40–60)
HGB BLD-MCNC: 13.6 G/DL (ref 12–15.9)
IMM GRANULOCYTES # BLD AUTO: 0.01 10*3/MM3 (ref 0–0.05)
IMM GRANULOCYTES NFR BLD AUTO: 0.1 % (ref 0–0.5)
LDLC SERPL CALC-MCNC: 118 MG/DL (ref 0–100)
LDLC/HDLC SERPL: 1.43 {RATIO}
LYMPHOCYTES # BLD AUTO: 2.67 10*3/MM3 (ref 0.7–3.1)
LYMPHOCYTES NFR BLD AUTO: 39.2 % (ref 19.6–45.3)
MCH RBC QN AUTO: 27.1 PG (ref 26.6–33)
MCHC RBC AUTO-ENTMCNC: 32.5 G/DL (ref 31.5–35.7)
MCV RBC AUTO: 83.5 FL (ref 79–97)
MONOCYTES # BLD AUTO: 0.41 10*3/MM3 (ref 0.1–0.9)
MONOCYTES NFR BLD AUTO: 6 % (ref 5–12)
NEUTROPHILS # BLD AUTO: 3.4 10*3/MM3 (ref 1.7–7)
NEUTROPHILS NFR BLD AUTO: 50 % (ref 42.7–76)
NRBC BLD AUTO-RTO: 0 /100 WBC (ref 0–0.2)
PLATELET # BLD AUTO: 431 10*3/MM3 (ref 140–450)
POTASSIUM SERPL-SCNC: 3.7 MMOL/L (ref 3.5–5.2)
PROT SERPL-MCNC: 6.7 G/DL (ref 6–8.5)
RBC # BLD AUTO: 5.02 10*6/MM3 (ref 3.77–5.28)
SODIUM SERPL-SCNC: 141 MMOL/L (ref 136–145)
T4 FREE SERPL-MCNC: 1.29 NG/DL (ref 0.93–1.7)
TRIGL SERPL-MCNC: 74 MG/DL (ref 0–150)
TSH SERPL DL<=0.005 MIU/L-ACNC: 1.58 UIU/ML (ref 0.27–4.2)
VLDLC SERPL CALC-MCNC: 13 MG/DL (ref 5–40)
WBC # BLD AUTO: 6.81 10*3/MM3 (ref 3.4–10.8)

## 2021-02-26 PROCEDURE — 99214 OFFICE O/P EST MOD 30 MIN: CPT | Performed by: NURSE PRACTITIONER

## 2021-02-26 RX ORDER — FLUOXETINE 10 MG/1
10 CAPSULE ORAL DAILY
Qty: 90 CAPSULE | Refills: 1 | Status: SHIPPED | OUTPATIENT
Start: 2021-02-26 | End: 2021-09-20 | Stop reason: SDUPTHER

## 2021-02-26 RX ORDER — ALBUTEROL SULFATE 90 UG/1
1 AEROSOL, METERED RESPIRATORY (INHALATION) EVERY 4 HOURS PRN
Qty: 8.5 G | Refills: 3 | Status: SHIPPED | OUTPATIENT
Start: 2021-02-26

## 2021-02-26 RX ORDER — FLUOXETINE HYDROCHLORIDE 20 MG/1
20 CAPSULE ORAL DAILY
Qty: 90 CAPSULE | Refills: 3 | Status: SHIPPED | OUTPATIENT
Start: 2021-02-26 | End: 2022-03-29 | Stop reason: SDUPTHER

## 2021-02-26 RX ORDER — BUPROPION HYDROCHLORIDE 300 MG/1
300 TABLET ORAL EVERY MORNING
Qty: 90 TABLET | Refills: 3 | Status: SHIPPED | OUTPATIENT
Start: 2021-02-26 | End: 2022-03-29 | Stop reason: SDUPTHER

## 2021-02-26 RX ORDER — HYDROCHLOROTHIAZIDE 25 MG/1
25 TABLET ORAL DAILY
Qty: 90 TABLET | Refills: 1 | Status: SHIPPED | OUTPATIENT
Start: 2021-02-26 | End: 2021-09-20 | Stop reason: SDUPTHER

## 2021-02-26 RX ORDER — MELOXICAM 15 MG/1
15 TABLET ORAL DAILY
Qty: 90 TABLET | Refills: 1 | Status: SHIPPED | OUTPATIENT
Start: 2021-02-26 | End: 2021-09-20 | Stop reason: SDUPTHER

## 2021-02-26 RX ORDER — SPIRONOLACTONE 25 MG/1
25 TABLET ORAL DAILY
Qty: 90 TABLET | Refills: 1 | Status: SHIPPED | OUTPATIENT
Start: 2021-02-26 | End: 2021-09-20 | Stop reason: SDUPTHER

## 2021-02-26 NOTE — PROGRESS NOTES
Kiko Lebron is a 50 y.o. female. Patient is here today for   Chief Complaint   Patient presents with   • Anxiety   • Establish Care          Vitals:    21 0805   BP: 142/100   Pulse: 100   Resp: 18   Temp: 96.9 °F (36.1 °C)   SpO2: 97%     Body mass index is 42.4 kg/m².  The following portions of the patient's history were reviewed and updated as appropriate: allergies, current medications, past family history, past medical history, past social history, past surgical history and problem list.    Past Medical History:   Diagnosis Date   • Allergic    • Anemia    • Anxiety    • Arthritis    • Back problem    • Depression    • GERD (gastroesophageal reflux disease)    • Knee problem    • Methicillin resistant Staph aureus culture positive    • Obesity    • Prediabetes    • Tendinitis of right elbow       Allergies   Allergen Reactions   • Sulfa Antibiotics Itching   • Adhesive Tape Swelling     Gets some redness and swelling   • Latex Dermatitis, Itching, Rash and Swelling      Social History     Socioeconomic History   • Marital status: Single     Spouse name: Not on file   • Number of children: Not on file   • Years of education: Not on file   • Highest education level: Not on file   Tobacco Use   • Smoking status: Former Smoker     Packs/day: 1.00     Years: 8.00     Pack years: 8.00     Types: Cigarettes     Quit date:      Years since quittin.1   • Smokeless tobacco: Never Used   Substance and Sexual Activity   • Alcohol use: Yes     Comment: rarely   • Drug use: Never   • Sexual activity: Not Currently        Current Outpatient Medications:   •  albuterol sulfate HFA (ProAir HFA) 108 (90 Base) MCG/ACT inhaler, Inhale 1 puff Every 4 (Four) Hours As Needed for Wheezing or Shortness of Air., Disp: 8.5 g, Rfl: 3  •  buPROPion XL (WELLBUTRIN XL) 300 MG 24 hr tablet, Take 1 tablet by mouth Every Morning., Disp: 90 tablet, Rfl: 3  •  cetirizine (zyrTEC) 10 MG tablet, Take 10 mg by mouth  Daily., Disp: , Rfl:   •  Cyanocobalamin (VITAMIN B 12 PO), Take 1,000 mcg by mouth., Disp: , Rfl:   •  cyclobenzaprine (FLEXERIL) 10 MG tablet, Take 1 tablet by mouth 3 (Three) Times a Day As Needed for Muscle Spasms., Disp: 90 tablet, Rfl: 1  •  FLUoxetine (PROzac) 20 MG capsule, Take 1 capsule by mouth Daily., Disp: 90 capsule, Rfl: 3  •  fluticasone (FLONASE) 50 MCG/ACT nasal spray, 2 sprays into the nostril(s) as directed by provider Daily., Disp: , Rfl:   •  hydroCHLOROthiazide (HYDRODIURIL) 25 MG tablet, Take 1 tablet by mouth Daily., Disp: 90 tablet, Rfl: 1  •  meloxicam (MOBIC) 15 MG tablet, Take 1 tablet by mouth Daily., Disp: 90 tablet, Rfl: 1  •  pseudoephedrine (SUDAFED) 30 MG tablet, Take 60 mg by mouth., Disp: , Rfl:   •  FLUoxetine (PROzac) 10 MG capsule, Take 1 capsule by mouth Daily., Disp: 90 capsule, Rfl: 1  •  Phendimetrazine Tartrate  MG capsule sustained-release 24 hr, phendimetrazine tartrate  mg capsule,extended release  Take 1 capsule every day by oral route., Disp: , Rfl:   •  spironolactone (Aldactone) 25 MG tablet, Take 1 tablet by mouth Daily., Disp: 90 tablet, Rfl: 1     Objective     History of Present Illness  Ce is a 50 year old new patient who is transferring care from Dr Marin. She has Prediabetes, GERD, Anxiety, depression and lower extremity edema. She was on spironolactone and hctz for lower extremity edema. She went off the of the spironolactone and would like to restart it. Due some family issues and her mom being ill she has had worsening anxiety and depression over the last 6 months. She has been compliant with her medication and is not experiencing any side effects. She last had a physical and labs in august 2020. I reviewed her labs and previous notes in Epic.     Review of Systems   Constitutional: Positive for fatigue.   Eyes: Negative for visual disturbance.   Respiratory: Negative for cough, chest tightness and shortness of breath.     Gastrointestinal: Negative.    Genitourinary: Negative.    Musculoskeletal: Negative.    Neurological: Negative for dizziness and headaches.   Psychiatric/Behavioral: Positive for dysphoric mood and sleep disturbance. The patient is nervous/anxious.        Physical Exam  Vitals signs and nursing note reviewed.   HENT:      Head: Normocephalic.   Eyes:      General: Lids are normal.   Neck:      Musculoskeletal: Full passive range of motion without pain.   Cardiovascular:      Rate and Rhythm: Normal rate and regular rhythm.      Heart sounds: Normal heart sounds.      Comments: 152/98  Pulmonary:      Effort: Pulmonary effort is normal.      Breath sounds: Normal breath sounds.   Skin:     General: Skin is warm and dry.   Neurological:      Mental Status: She is alert and oriented to person, place, and time.   Psychiatric:         Behavior: Behavior is cooperative.         ASSESSMENT     Problems Addressed this Visit     Morbid obesity with BMI of 45.0-49.9, adult (CMS/[facility])    Gastroesophageal reflux disease    Prediabetes    Relevant Orders    Comprehensive Metabolic Panel    Hemoglobin A1c    Vitamin D deficiency    Relevant Orders    Vitamin D 25 Hydroxy    Anxiety    Relevant Medications    FLUoxetine (PROzac) 20 MG capsule    Recurrent major depressive disorder, in full remission (CMS/[facility]) - Primary    Relevant Medications    buPROPion XL (WELLBUTRIN XL) 300 MG 24 hr tablet    FLUoxetine (PROzac) 20 MG capsule    FLUoxetine (PROzac) 10 MG capsule      Other Visit Diagnoses     Elevated blood pressure reading in office with diagnosis of hypertension        Relevant Medications    spironolactone (Aldactone) 25 MG tablet    hydroCHLOROthiazide (HYDRODIURIL) 25 MG tablet    Other Relevant Orders    CBC & Differential    Comprehensive Metabolic Panel    T4, Free    TSH    Urinalysis With Microscopic - Urine, Clean Catch    Lower extremity edema        Relevant Orders    CBC & Differential    T4, Free    TSH     Moderate mixed hyperlipidemia not requiring statin therapy        Relevant Orders    Lipid Panel With LDL / HDL Ratio    Screening mammogram, encounter for        Relevant Orders    Mammo Screening Bilateral With CAD    Encounter for screening colonoscopy        Relevant Orders    Ambulatory Referral to General Surgery      Diagnoses       Codes Comments    Recurrent major depressive disorder, in full remission (CMS/[facility])    -  Primary ICD-10-CM: F33.42  ICD-9-CM: 296.36     Anxiety     ICD-10-CM: F41.9  ICD-9-CM: 300.00     Gastroesophageal reflux disease, unspecified whether esophagitis present     ICD-10-CM: K21.9  ICD-9-CM: 530.81     Morbid obesity with BMI of 45.0-49.9, adult (CMS/[facility])     ICD-10-CM: E66.01, Z68.42  ICD-9-CM: 278.01, V85.42     Elevated blood pressure reading in office with diagnosis of hypertension     ICD-10-CM: I10  ICD-9-CM: 401.9     Lower extremity edema     ICD-10-CM: R60.0  ICD-9-CM: 782.3     Vitamin D deficiency     ICD-10-CM: E55.9  ICD-9-CM: 268.9     Prediabetes     ICD-10-CM: R73.03  ICD-9-CM: 790.29     Moderate mixed hyperlipidemia not requiring statin therapy     ICD-10-CM: E78.2  ICD-9-CM: 272.2     Screening mammogram, encounter for     ICD-10-CM: Z12.31  ICD-9-CM: V76.12     Encounter for screening colonoscopy     ICD-10-CM: Z12.11  ICD-9-CM: V76.51           PLAN    Will check labs and call with results  Mammogram and referral for screening colonoscopy  Her BP is high today, I have asked her to check it at home regularly and send me a log through [employer]  She is wanted to restart spironolactone for LE edema and because it helps keep her skin clear  Will increase fluoxetine to 30mg daily, recommend counseling   Continue wellbutrin  Discussed diet exercise and weight loss - [facility] Bont[facility]   Return in about 3 months (around 5/26/2021) for Recheck. BP and follow up on anxiety, will check a BMP at that time

## 2021-03-04 ENCOUNTER — HOSPITAL ENCOUNTER (OUTPATIENT)
Dept: PHYSICAL THERAPY | Facility: HOSPITAL | Age: 51
Setting detail: THERAPIES SERIES
Discharge: HOME | End: 2021-03-04

## 2021-03-04 DIAGNOSIS — M17.0 OSTEOARTHRITIS OF BOTH KNEES, UNSPECIFIED OSTEOARTHRITIS TYPE: ICD-10-CM

## 2021-03-04 DIAGNOSIS — M25.559 HIP PAIN: ICD-10-CM

## 2021-03-04 DIAGNOSIS — R26.2 DIFFICULTY WALKING: Primary | ICD-10-CM

## 2021-03-04 PROCEDURE — 97110 THERAPEUTIC EXERCISES: CPT

## 2021-03-04 NOTE — THERAPY TREATMENT NOTE
Outpatient Physical Therapy Ortho Treatment Note  [facility]     Patient Name: Ce Lebron  : 1970  MRN: [id number]  Today's Date: 3/4/2021      Visit Date: 2021    Visit Dx:    ICD-10-CM ICD-9-CM   1. Difficulty walking  R26.2 719.7   2. Osteoarthritis of both knees, unspecified osteoarthritis type  M17.0 715.96   3. Hip pain  M25.559 719.45       Patient Active Problem List   Diagnosis   • Low back pain   • Morbid obesity with BMI of 45.0-49.9, adult (CMS/[facility])   • Depression   • Gastroesophageal reflux disease   • History of tobacco use   • Prediabetes   • Vitamin D deficiency   • Anxiety   • Recurrent major depressive disorder, in full remission (CMS/[facility])        Past Medical History:   Diagnosis Date   • Allergic    • Anemia    • Anxiety    • Arthritis    • Back problem    • Depression    • GERD (gastroesophageal reflux disease)    • Knee problem    • Methicillin resistant Staph aureus culture positive    • Obesity    • Prediabetes    • Tendinitis of right elbow         Past Surgical History:   Procedure Laterality Date   • ADENOIDECTOMY     • APPENDECTOMY  2012   •  SECTION     • DIAGNOSTIC LAPAROSCOPY, SALPINGO OOPHORECTOMY LAPAROSCOPIC     • ENDOMETRIAL ABLATION  10/2012   • LAPAROSCOPIC CHOLECYSTECTOMY  2005   • LASIK     • TONSILLECTOMY     • VENTRAL HERNIA REPAIR  2016                       PT Assessment/Plan     Row Name 21 1742          PT Assessment    Assessment Comments  Ms. Lebron returns for first follow up visit from initial evaluation, pt. reports 0/10 pain and feels she is able to walk increased distance since recieving injection in January. Pt. reports she has not been consistent with HEP thus far therefore reviewed current program with addition of several exercises focused on hip girdle strengthening. Pt. tolerated well without increase in pain but notes feeling LE weakness.  -        PT Plan    PT Plan Comments  Progress as able;  update HEP  -MH       User Key  (r) = Recorded By, (t) = Taken By, (c) = Cosigned By    Initials Name Provider Type     Joy Castaneda, PT Physical Therapist            OP Exercises     Row Name 03/04/21 1700             Subjective Comments    Subjective Comments  I havent been great about my exercises, I did have a shot in my L hip and knee 1/26 and now I feel like I can walk further than before  -MH         Subjective Pain    Able to rate subjective pain?  yes  -MH      Pre-Treatment Pain Level  0  -MH         Total Minutes    86906 - PT Therapeutic Exercise Minutes  39  -MH         Exercise 1    Exercise Name 1  quad set  -MH      Cueing 1  Verbal  -MH      Reps 1  10  -MH      Time 1  5  -MH         Exercise 2    Exercise Name 2  S/L clam  -MH      Cueing 2  Verbal  -MH      Reps 2  10  -MH      Additional Comments  YTB   -MH         Exercise 3    Exercise Name 3  piriformis/fig4 stretch  -MH      Cueing 3  Verbal  -MH      Reps 3  3  -MH      Time 3  20sec  -MH         Exercise 4    Exercise Name 4  hamstring stretch EOB  -MH      Cueing 4  Verbal  -MH      Reps 4  3  -MH      Time 4  20  -MH         Exercise 5    Exercise Name 5  standing gastroc stretch  -MH      Cueing 5  Verbal  -MH      Reps 5  2  -MH      Time 5  20  -MH         Exercise 6    Exercise Name 6  SLR  -MH      Cueing 6  Verbal  -MH      Sets 6  1e  -MH      Reps 6  10  -MH      Time 6  quad set first  -MH      Additional Comments  toe in and toe out 1 set each  -MH         Exercise 7    Exercise Name 7  bridge  -MH      Cueing 7  Verbal  -MH      Reps 7  10  -MH      Time 7  2-3sec  -MH         Exercise 8    Exercise Name 8  side-step  -MH      Cueing 8  Verbal  -MH      Reps 8  3 laps  -MH      Additional Comments  YTB  -MH        User Key  (r) = Recorded By, (t) = Taken By, (c) = Cosigned By    Initials Name Provider Type     Joy Castaneda, PT Physical Therapist                       PT OP Goals     Row Name 03/04/21 1700          PT  Short Term Goals    STG Date to Achieve  02/25/21  -     STG 1  Pt. Will be independent with initial HEP to improve self-management of condition.  -     STG 1 Progress  Ongoing  -     STG 2  Pt. will report ability to ambulate >/= 30 minutes without increase in pain to ease ability to complete ADLs  -     STG 2 Progress  Ongoing  -        Long Term Goals    LTG Date to Achieve  03/14/21  -     LTG 1  Pt. Will be independent with advanced HEP to improve long-term management of condition and independence.  -     LTG 1 Progress  Ongoing  -     LTG 2  Pt. Will score >/= 85% on KOS  (from 69% on initial evaluation) to indicate improved perception of disability.  -     LTG 2 Progress  Ongoing  -     LTG 3  Pt. will demonstrate improved B hip strength >/= 4+/5 to reduce undue strain at lateral hips and knees.  -     LTG 3 Progress  Ongoing  -     LTG 4  Pt. will begin performing tae-annmarie-do movements without increase in pain to progress toward return to sport/hobby.  -     LTG 4 Progress  Ongoing  [facility]       User Key  (r) = Recorded By, (t) = Taken By, (c) = Cosigned By    Initials Name Provider Type     Joy Castaneda PT Physical Therapist          Therapy Education  Education Details: Continue initial HEP as pt. reports unable to compelte consistently  Given: HEP  Program: Reinforced  How Provided: Verbal, Demonstration  Provided to: Patient  Level of Understanding: Verbalized, Demonstrated              Time Calculation:   Start Time: 1700  Stop Time: 1739  Time Calculation (min): 39 min  Total Timed Code Minutes- PT: 39 minute(s)  Therapy Charges for Today     Code Description Service Date Service Provider Modifiers Qty    11499019417 HC PT THER PROC EA 15 MIN 3/4/2021 Joy Castaneda, PT GP 3                    Joy Castaneda PT  3/4/2021

## 2021-03-09 ENCOUNTER — OFFICE VISIT (OUTPATIENT)
Dept: ORTHOPEDIC SURGERY | Facility: CLINIC | Age: 51
End: 2021-03-09

## 2021-03-09 ENCOUNTER — TELEPHONE (OUTPATIENT)
Dept: PHYSICAL THERAPY | Facility: HOSPITAL | Age: 51
End: 2021-03-09

## 2021-03-09 VITALS — WEIGHT: 247 LBS | TEMPERATURE: 97.2 F | BODY MASS INDEX: 42.17 KG/M2 | HEIGHT: 64 IN

## 2021-03-09 DIAGNOSIS — M70.62 TROCHANTERIC BURSITIS OF BOTH HIPS: Primary | ICD-10-CM

## 2021-03-09 DIAGNOSIS — M70.61 TROCHANTERIC BURSITIS OF BOTH HIPS: Primary | ICD-10-CM

## 2021-03-09 DIAGNOSIS — M17.0 PRIMARY OSTEOARTHRITIS OF BOTH KNEES: ICD-10-CM

## 2021-03-09 PROCEDURE — 99213 OFFICE O/P EST LOW 20 MIN: CPT | Performed by: NURSE PRACTITIONER

## 2021-03-09 NOTE — TELEPHONE ENCOUNTER
LVM re: no show for today's appt. Informed pt that d/t 2 same day cancels and today's no show, we will be removing all scheduled appts due to violation of attendance policy. Left number for pt to call back with questions.

## 2021-03-09 NOTE — PROGRESS NOTES
"Patient: Ce Lebron  YOB: 1970 50 y.o. female  Medical Record Number: [id number]    Chief Complaints:   Chief Complaint   Patient presents with   • Left Knee - Follow-up, Pain   • Left Hip - Follow-up, Pain       History of Present Illness:Ce Lebron is a 50 y.o. female who presents with with complaints of bilateral hip soreness and bilateral knee pain.  She was seen about 6 weeks ago and at that time she had bilateral hip bursa injections, that helped considerably, she has been working with physical therapy.  Reports that the left hip is worse than the right.  She also has a history of bilateral knee osteoarthritis but at this point knees\" are doing okay\" describes the hip soreness as a moderate constant ache worse when she lies on her side better with change in position    Allergies:   Allergies   Allergen Reactions   • Sulfa Antibiotics Itching   • Adhesive Tape Swelling     Gets some redness and swelling   • Latex Dermatitis, Itching, Rash and Swelling       Medications:   Current Outpatient Medications   Medication Sig Dispense Refill   • albuterol sulfate HFA (ProAir HFA) 108 (90 Base) MCG/ACT inhaler Inhale 1 puff Every 4 (Four) Hours As Needed for Wheezing or Shortness of Air. 8.5 g 3   • buPROPion XL (WELLBUTRIN XL) 300 MG 24 hr tablet Take 1 tablet by mouth Every Morning. 90 tablet 3   • cetirizine (zyrTEC) 10 MG tablet Take 10 mg by mouth Daily.     • Cyanocobalamin (VITAMIN B 12 PO) Take 1,000 mcg by mouth.     • cyclobenzaprine (FLEXERIL) 10 MG tablet Take 1 tablet by mouth 3 (Three) Times a Day As Needed for Muscle Spasms. 90 tablet 1   • FLUoxetine (PROzac) 10 MG capsule Take 1 capsule by mouth Daily. 90 capsule 1   • FLUoxetine (PROzac) 20 MG capsule Take 1 capsule by mouth Daily. 90 capsule 3   • fluticasone (FLONASE) 50 MCG/ACT nasal spray 2 sprays into the nostril(s) as directed by provider Daily.     • hydroCHLOROthiazide (HYDRODIURIL) 25 MG tablet Take 1 tablet by mouth Daily. " "90 tablet 1   • meloxicam (MOBIC) 15 MG tablet Take 1 tablet by mouth Daily. 90 tablet 1   • Phendimetrazine Tartrate  MG capsule sustained-release 24 hr phendimetrazine tartrate  mg capsule,extended release   Take 1 capsule every day by oral route.     • pseudoephedrine (SUDAFED) 30 MG tablet Take 60 mg by mouth.     • spironolactone (Aldactone) 25 MG tablet Take 1 tablet by mouth Daily. 90 tablet 1     No current facility-administered medications for this visit.         The following portions of the patient's history were reviewed and updated as appropriate: allergies, current medications, past family history, past medical history, past social history, past surgical history and problem list.    Review of Systems:   A 14 point review of systems was performed. All systems negative except pertinent positives/negative listed in HPI above    Physical Exam:   Vitals:    03/09/21 1550   Temp: 97.2 °F (36.2 °C)   Weight: 112 kg (247 lb)   Height: 162.6 cm (64\")   PainSc:   1       General: A and O x 3, ASA, NAD    SCLERA:    Normal    DENTITION:   Normal  Skin clear no unusual lesions noted  Bilateral hips patient is tender over bilateral hip greater trochanteric bursa she has excellent range of motion of both hips as well as bilateral knees, calf soft and nontender       Radiology:  Xrays 3views (ap,lateral, sunrise) previous x-rays of bilateral knees as well as bilateral hips were reviewed the patient does have minimal arthritic changes noted of hips and she does have arthritic changes noted both knees    Assessment/Plan: Bilateral hip greater trochanteric bursitis  Bilateral knee osteoarthritis    Patient I discussed options, she at this point does not feel like she needs injections but that certainly an option in the future, she was given samples of Pennsaid gel to use twice a day, she will does note that something she would like a prescription for, otherwise she will continue with physical therapy and we " will see her back as needed      Ana Adkins, APRN  3/9/2021

## 2021-03-24 ENCOUNTER — TELEPHONE (OUTPATIENT)
Dept: INTERNAL MEDICINE | Facility: CLINIC | Age: 51
End: 2021-03-24

## 2021-03-24 ENCOUNTER — HOSPITAL ENCOUNTER (OUTPATIENT)
Dept: MAMMOGRAPHY | Facility: HOSPITAL | Age: 51
Discharge: HOME | End: 2021-03-24
Admitting: NURSE PRACTITIONER

## 2021-03-24 DIAGNOSIS — R92.8 ABNORMALITY OF LEFT BREAST ON SCREENING MAMMOGRAPHY: Primary | ICD-10-CM

## 2021-03-24 PROCEDURE — 77067 SCR MAMMO BI INCL CAD: CPT

## 2021-03-24 PROCEDURE — 77063 BREAST TOMOSYNTHESIS BI: CPT

## 2021-03-24 NOTE — TELEPHONE ENCOUNTER
Please notify patient There is an area of focal asymmetry left breast. Further evaluation is recommended   Order placed for diagnostic mammo and US

## 2021-04-06 ENCOUNTER — HOSPITAL ENCOUNTER (OUTPATIENT)
Dept: ULTRASOUND IMAGING | Facility: HOSPITAL | Age: 51
End: 2021-04-06

## 2021-04-06 ENCOUNTER — HOSPITAL ENCOUNTER (OUTPATIENT)
Dept: MAMMOGRAPHY | Facility: HOSPITAL | Age: 51
Discharge: HOME | End: 2021-04-06
Admitting: NURSE PRACTITIONER

## 2021-04-06 DIAGNOSIS — R92.8 ABNORMALITY OF LEFT BREAST ON SCREENING MAMMOGRAPHY: ICD-10-CM

## 2021-04-06 PROCEDURE — G0279 TOMOSYNTHESIS, MAMMO: HCPCS

## 2021-04-06 PROCEDURE — 77065 DX MAMMO INCL CAD UNI: CPT

## 2021-04-07 ENCOUNTER — TELEPHONE (OUTPATIENT)
Dept: INTERNAL MEDICINE | Facility: CLINIC | Age: 51
End: 2021-04-07

## 2021-04-07 DIAGNOSIS — R92.8 ABNORMAL MAMMOGRAM OF LEFT BREAST: Primary | ICD-10-CM

## 2021-04-07 NOTE — TELEPHONE ENCOUNTER
Please notify patient that mammogram showed Probable benign superimposition of normal breast parenchyma  in the posterior one-third retroareolar region of the left breast.  Six-month mammographic and tomosynthesis follow-up of the left breast is  Recommended.  I placed the order for left diagnostic mammo

## 2021-05-28 ENCOUNTER — CLINICAL SUPPORT (OUTPATIENT)
Dept: ORTHOPEDIC SURGERY | Facility: CLINIC | Age: 51
End: 2021-05-28

## 2021-05-28 ENCOUNTER — OFFICE VISIT (OUTPATIENT)
Dept: INTERNAL MEDICINE | Facility: CLINIC | Age: 51
End: 2021-05-28

## 2021-05-28 VITALS — BODY MASS INDEX: 41.64 KG/M2 | HEIGHT: 63 IN | TEMPERATURE: 98 F | WEIGHT: 235 LBS

## 2021-05-28 VITALS
BODY MASS INDEX: 40.12 KG/M2 | SYSTOLIC BLOOD PRESSURE: 129 MMHG | TEMPERATURE: 97.5 F | DIASTOLIC BLOOD PRESSURE: 82 MMHG | HEIGHT: 64 IN | WEIGHT: 235 LBS | HEART RATE: 94 BPM

## 2021-05-28 DIAGNOSIS — I10 ESSENTIAL HYPERTENSION: ICD-10-CM

## 2021-05-28 DIAGNOSIS — F33.42 RECURRENT MAJOR DEPRESSIVE DISORDER, IN FULL REMISSION (HCC): ICD-10-CM

## 2021-05-28 DIAGNOSIS — M17.12 PRIMARY OSTEOARTHRITIS OF LEFT KNEE: ICD-10-CM

## 2021-05-28 DIAGNOSIS — M70.62 TROCHANTERIC BURSITIS OF LEFT HIP: Primary | ICD-10-CM

## 2021-05-28 DIAGNOSIS — F41.9 ANXIETY: Primary | ICD-10-CM

## 2021-05-28 PROCEDURE — 20610 DRAIN/INJ JOINT/BURSA W/O US: CPT | Performed by: NURSE PRACTITIONER

## 2021-05-28 PROCEDURE — 99213 OFFICE O/P EST LOW 20 MIN: CPT | Performed by: NURSE PRACTITIONER

## 2021-05-28 RX ORDER — METHYLPREDNISOLONE ACETATE 80 MG/ML
80 INJECTION, SUSPENSION INTRA-ARTICULAR; INTRALESIONAL; INTRAMUSCULAR; SOFT TISSUE
Status: COMPLETED | OUTPATIENT
Start: 2021-05-28 | End: 2021-05-28

## 2021-05-28 RX ORDER — LIDOCAINE HYDROCHLORIDE 20 MG/ML
2 INJECTION, SOLUTION EPIDURAL; INFILTRATION; INTRACAUDAL; PERINEURAL
Status: COMPLETED | OUTPATIENT
Start: 2021-05-28 | End: 2021-05-28

## 2021-05-28 RX ADMIN — METHYLPREDNISOLONE ACETATE 80 MG: 80 INJECTION, SUSPENSION INTRA-ARTICULAR; INTRALESIONAL; INTRAMUSCULAR; SOFT TISSUE at 15:06

## 2021-05-28 RX ADMIN — LIDOCAINE HYDROCHLORIDE 2 ML: 20 INJECTION, SOLUTION EPIDURAL; INFILTRATION; INTRACAUDAL; PERINEURAL at 15:06

## 2021-05-28 NOTE — PROGRESS NOTES
5/28/2021    Ce Lebron is here today for worsening knee pain. Pt has undergone injection of the knee in the past with good resolution of symptoms. Pt is requesting a repeat injection.     KNEE Injection Procedure Note:    Large Joint Arthrocentesis: L knee  Date/Time: 5/28/2021 3:06 PM  Consent given by: patient  Site marked: site marked  Timeout: Immediately prior to procedure a time out was called to verify the correct patient, procedure, equipment, support staff and site/side marked as required   Supporting Documentation  Indications: pain and joint swelling   Procedure Details  Location: knee - L knee  Preparation: Patient was prepped and draped in the usual sterile fashion  Needle size: 22 G  Approach: anterolateral  Medications administered: 80 mg methylPREDNISolone acetate 80 MG/ML; 2 mL lidocaine PF 2% 2 %  Patient tolerance: patient tolerated the procedure well with no immediate complications    Large Joint Arthrocentesis: L greater trochanteric bursa  Date/Time: 5/28/2021 3:06 PM  Consent given by: patient  Site marked: site marked  Timeout: Immediately prior to procedure a time out was called to verify the correct patient, procedure, equipment, support staff and site/side marked as required   Supporting Documentation  Indications: pain   Procedure Details  Location: hip - L greater trochanteric bursa  Needle size: 22 G  Approach: lateral  Medications administered: 80 mg methylPREDNISolone acetate 80 MG/ML; 2 mL lidocaine PF 2% 2 %            At the conclusion of the injection I discussed the importance of continued quad strengthening exercises on a daily basis. I will see the patient back if the symptoms should fail to improve or worsen.    Ana Adkins, APRN  5/28/2021

## 2021-05-28 NOTE — PROGRESS NOTES
Kiko Lebron is a 50 y.o. female. Patient is here today for   Chief Complaint   Patient presents with   • Depression          Vitals:    21 1357   BP: 129/82   Pulse: 94   Temp: 97.5 °F (36.4 °C)     Body mass index is 40.62 kg/m².  The following portions of the patient's history were reviewed and updated as appropriate: allergies, current medications, past family history, past medical history, past social history, past surgical history and problem list.    Past Medical History:   Diagnosis Date   • Allergic    • Anemia    • Anxiety    • Arthritis    • Back problem    • Depression    • GERD (gastroesophageal reflux disease)    • Knee problem    • Methicillin resistant Staph aureus culture positive    • Obesity    • Prediabetes    • Tendinitis of right elbow       Allergies   Allergen Reactions   • Sulfa Antibiotics Itching   • Adhesive Tape Swelling     Gets some redness and swelling   • Latex Dermatitis, Itching, Rash and Swelling      Social History     Socioeconomic History   • Marital status: Single     Spouse name: Not on file   • Number of children: Not on file   • Years of education: Not on file   • Highest education level: Not on file   Tobacco Use   • Smoking status: Former Smoker     Packs/day: 1.00     Years: 8.00     Pack years: 8.00     Types: Cigarettes     Quit date:      Years since quittin.4   • Smokeless tobacco: Never Used   Vaping Use   • Vaping Use: Never used   Substance and Sexual Activity   • Alcohol use: Yes     Comment: rarely   • Drug use: Never   • Sexual activity: Not Currently        Current Outpatient Medications:   •  albuterol sulfate HFA (ProAir HFA) 108 (90 Base) MCG/ACT inhaler, Inhale 1 puff Every 4 (Four) Hours As Needed for Wheezing or Shortness of Air., Disp: 8.5 g, Rfl: 3  •  buPROPion XL (WELLBUTRIN XL) 300 MG 24 hr tablet, Take 1 tablet by mouth Every Morning., Disp: 90 tablet, Rfl: 3  •  cetirizine (zyrTEC) 10 MG tablet, Take 10 mg by mouth  Daily., Disp: , Rfl:   •  Cyanocobalamin (VITAMIN B 12 PO), Take 1,000 mcg by mouth., Disp: , Rfl:   •  cyclobenzaprine (FLEXERIL) 10 MG tablet, Take 1 tablet by mouth 3 (Three) Times a Day As Needed for Muscle Spasms., Disp: 90 tablet, Rfl: 1  •  FLUoxetine (PROzac) 10 MG capsule, Take 1 capsule by mouth Daily., Disp: 90 capsule, Rfl: 1  •  FLUoxetine (PROzac) 20 MG capsule, Take 1 capsule by mouth Daily., Disp: 90 capsule, Rfl: 3  •  fluticasone (FLONASE) 50 MCG/ACT nasal spray, 2 sprays into the nostril(s) as directed by provider Daily., Disp: , Rfl:   •  hydroCHLOROthiazide (HYDRODIURIL) 25 MG tablet, Take 1 tablet by mouth Daily., Disp: 90 tablet, Rfl: 1  •  meloxicam (MOBIC) 15 MG tablet, Take 1 tablet by mouth Daily., Disp: 90 tablet, Rfl: 1  •  Phendimetrazine Tartrate  MG capsule sustained-release 24 hr, phendimetrazine tartrate  mg capsule,extended release  Take 1 capsule every day by oral route., Disp: , Rfl:   •  pseudoephedrine (SUDAFED) 30 MG tablet, Take 60 mg by mouth., Disp: , Rfl:   •  spironolactone (Aldactone) 25 MG tablet, Take 1 tablet by mouth Daily., Disp: 90 tablet, Rfl: 1  No current facility-administered medications for this visit.     Objective     History of Present Illness  Deb is a 50 year old female patient who is here for a 3 month follow up for HTN and depression. Depression and anxiety has improved on increasing fluoxetine. She is tolerating her medication without difficulty. She is exercising, eating healthy and doing a weight loss plan. She still has some anxiety.   She has not been checking her BP at home. She would like a covid antibody test.   Review of Systems   Respiratory: Negative.    Cardiovascular: Negative.    Psychiatric/Behavioral: Positive for dysphoric mood. The patient is nervous/anxious.        Physical Exam  Vitals and nursing note reviewed.   Constitutional:       General: She is not in acute distress.     Appearance: Normal appearance. She is  well-developed and well-groomed.   HENT:      Head: Normocephalic.   Cardiovascular:      Rate and Rhythm: Normal rate and regular rhythm.   Pulmonary:      Effort: Pulmonary effort is normal.      Breath sounds: Normal breath sounds.   Neurological:      Mental Status: She is alert.         ASSESSMENT     Problems Addressed this Visit     Depression    Relevant Orders    Basic metabolic panel    Anxiety - Primary    Relevant Orders    Basic metabolic panel      Other Visit Diagnoses     Essential hypertension        Relevant Orders    Basic metabolic panel      Diagnoses       Codes Comments    Anxiety    -  Primary ICD-10-CM: F41.9  ICD-9-CM: 300.00     Recurrent major depressive disorder, in full remission (CMS/[facility])     ICD-10-CM: F33.42  ICD-9-CM: 296.36     Essential hypertension     ICD-10-CM: I10  ICD-9-CM: 401.9           PLAN    BP has improved, monitor BP at home or at work,   Continue with diet and exercise  Continue fluoxetine and wellbutrin for anxiety and depression, recommend self care and counseling if needed  Will check a BMP today and call with results, pt will get covid antibody test through [facility] program. Pt advised to check for results on [facility] portal or they will be mailed   Return in about 6 months (around 11/28/2021) for with labs, Annual physical. including A1C or sooner if needed

## 2021-05-29 LAB
BUN SERPL-MCNC: 14 MG/DL (ref 6–20)
BUN/CREAT SERPL: 15.6 (ref 7–25)
CALCIUM SERPL-MCNC: 9.9 MG/DL (ref 8.6–10.5)
CHLORIDE SERPL-SCNC: 100 MMOL/L (ref 98–107)
CO2 SERPL-SCNC: 26.2 MMOL/L (ref 22–29)
CREAT SERPL-MCNC: 0.9 MG/DL (ref 0.57–1)
GLUCOSE SERPL-MCNC: 96 MG/DL (ref 65–99)
POTASSIUM SERPL-SCNC: 4 MMOL/L (ref 3.5–5.2)
SODIUM SERPL-SCNC: 139 MMOL/L (ref 136–145)

## 2021-09-20 RX ORDER — MELOXICAM 15 MG/1
15 TABLET ORAL DAILY
Qty: 90 TABLET | Refills: 1 | Status: SHIPPED | OUTPATIENT
Start: 2021-09-20 | End: 2022-03-29 | Stop reason: SDUPTHER

## 2021-09-20 RX ORDER — CYCLOBENZAPRINE HCL 10 MG
10 TABLET ORAL 3 TIMES DAILY PRN
Qty: 90 TABLET | Refills: 1 | Status: SHIPPED | OUTPATIENT
Start: 2021-09-20 | End: 2023-01-30 | Stop reason: SDUPTHER

## 2021-09-20 RX ORDER — HYDROCHLOROTHIAZIDE 25 MG/1
25 TABLET ORAL DAILY
Qty: 90 TABLET | Refills: 1 | Status: SHIPPED | OUTPATIENT
Start: 2021-09-20 | End: 2022-03-29 | Stop reason: SDUPTHER

## 2021-09-20 RX ORDER — FLUOXETINE 10 MG/1
10 CAPSULE ORAL DAILY
Qty: 90 CAPSULE | Refills: 1 | Status: SHIPPED | OUTPATIENT
Start: 2021-09-20 | End: 2022-03-29 | Stop reason: SDUPTHER

## 2021-09-20 RX ORDER — SPIRONOLACTONE 25 MG/1
25 TABLET ORAL DAILY
Qty: 90 TABLET | Refills: 1 | Status: SHIPPED | OUTPATIENT
Start: 2021-09-20 | End: 2022-03-29 | Stop reason: SDUPTHER

## 2021-09-23 ENCOUNTER — OFFICE VISIT (OUTPATIENT)
Dept: ORTHOPEDIC SURGERY | Facility: CLINIC | Age: 51
End: 2021-09-23

## 2021-09-23 VITALS — BODY MASS INDEX: 36.37 KG/M2 | WEIGHT: 213 LBS | HEIGHT: 64 IN | TEMPERATURE: 95.8 F

## 2021-09-23 DIAGNOSIS — R52 PAIN: Primary | ICD-10-CM

## 2021-09-23 DIAGNOSIS — M54.40 ACUTE RIGHT-SIDED LOW BACK PAIN WITH SCIATICA, SCIATICA LATERALITY UNSPECIFIED: ICD-10-CM

## 2021-09-23 PROCEDURE — 72100 X-RAY EXAM L-S SPINE 2/3 VWS: CPT | Performed by: NURSE PRACTITIONER

## 2021-09-23 PROCEDURE — 99214 OFFICE O/P EST MOD 30 MIN: CPT | Performed by: NURSE PRACTITIONER

## 2021-09-23 RX ORDER — METHYLPREDNISOLONE 4 MG/1
TABLET ORAL
Qty: 21 TABLET | Refills: 0 | Status: SHIPPED | OUTPATIENT
Start: 2021-09-23 | End: 2021-10-18 | Stop reason: SDUPTHER

## 2021-09-23 NOTE — PROGRESS NOTES
willowPatient: Ce Lebron  YOB: 1970 50 y.o. female  Medical Record Number: [id number]    Chief Complaints:   Chief Complaint   Patient presents with   • Lumbar Spine - Establish Care       History of Present Illness:Ce Lebron is a 50 y.o. female who presents with new complaint of severe low back pain.  The patient has had history of back issues before but reports that it got better on its own.  Apparently several days ago she started with severe pain in her right lower back going down her right leg.  She denies any numbness or tingling.  Denies any specific injury.  She has tried scheduled ibuprofen with no improvement.    Allergies:   Allergies   Allergen Reactions   • Sulfa Antibiotics Itching   • Adhesive Tape Swelling     Gets some redness and swelling   • Latex Dermatitis, Itching, Rash and Swelling       Medications:   Current Outpatient Medications   Medication Sig Dispense Refill   • albuterol sulfate HFA (ProAir HFA) 108 (90 Base) MCG/ACT inhaler Inhale 1 puff Every 4 (Four) Hours As Needed for Wheezing or Shortness of Air. 8.5 g 3   • buPROPion XL (WELLBUTRIN XL) 300 MG 24 hr tablet Take 1 tablet by mouth Every Morning. 90 tablet 3   • cetirizine (zyrTEC) 10 MG tablet Take 10 mg by mouth Daily.     • Cyanocobalamin (VITAMIN B 12 PO) Take 1,000 mcg by mouth.     • cyclobenzaprine (FLEXERIL) 10 MG tablet Take 1 tablet by mouth 3 (Three) Times a Day As Needed for Muscle Spasms. 90 tablet 1   • FLUoxetine (PROzac) 10 MG capsule Take 1 capsule by mouth Daily. 90 capsule 1   • FLUoxetine (PROzac) 20 MG capsule Take 1 capsule by mouth Daily. 90 capsule 3   • fluticasone (FLONASE) 50 MCG/ACT nasal spray 2 sprays into the nostril(s) as directed by provider Daily.     • hydroCHLOROthiazide (HYDRODIURIL) 25 MG tablet Take 1 tablet by mouth Daily. 90 tablet 1   • meloxicam (MOBIC) 15 MG tablet Take 1 tablet by mouth Daily. 90 tablet 1   • Phendimetrazine Tartrate  MG capsule sustained-release  "24 hr phendimetrazine tartrate  mg capsule,extended release   Take 1 capsule every day by oral route.     • pseudoephedrine (SUDAFED) 30 MG tablet Take 60 mg by mouth.     • spironolactone (Aldactone) 25 MG tablet Take 1 tablet by mouth Daily. 90 tablet 1   • methylPREDNISolone (MEDROL) 4 MG dose pack Use as directed by package instructions 21 tablet 0     No current facility-administered medications for this visit.         The following portions of the patient's history were reviewed and updated as appropriate: allergies, current medications, past family history, past medical history, past social history, past surgical history and problem list.    Review of Systems:   A 14 point review of systems was performed. All systems negative except pertinent positives/negative listed in HPI above    Physical Exam:   Vitals:    09/23/21 0922   Temp: 95.8 °F (35.4 °C)   Weight: 96.6 kg (213 lb)   Height: 162.6 cm (64\")       General: A and O x 3, ASA, NAD    SCLERA:    Normal    Skin clear no unusual lesions noted  Low back patient has decreased range of motion secondary to pain with a positive right straight leg raise neurologic is intact       Radiology:  Xrays 2 views of lumbar spine were ordered and reviewed today secondary to pain and show significant arthritic changes noted and narrowing.  No compared to views available    Assessment/Plan: Severe low back pain with radiculopathy    Patient I discussed options, we will proceed with an MRI of the lumbar spine to further evaluate and patient will call couple days after regarding results and treatment options.  In the meantime to help provide her some relief we will try Medrol Dosepak as well as scheduled Tylenol.      Ana Adkins, APRN  9/23/2021  "

## 2021-10-04 ENCOUNTER — HOSPITAL ENCOUNTER (OUTPATIENT)
Dept: MAMMOGRAPHY | Facility: HOSPITAL | Age: 51
End: 2021-10-04

## 2021-10-08 ENCOUNTER — HOSPITAL ENCOUNTER (OUTPATIENT)
Dept: MAMMOGRAPHY | Facility: HOSPITAL | Age: 51
Discharge: HOME | End: 2021-10-08
Admitting: NURSE PRACTITIONER

## 2021-10-08 DIAGNOSIS — R92.8 ABNORMAL MAMMOGRAM OF LEFT BREAST: ICD-10-CM

## 2021-10-08 PROCEDURE — G0279 TOMOSYNTHESIS, MAMMO: HCPCS

## 2021-10-08 PROCEDURE — 77065 DX MAMMO INCL CAD UNI: CPT

## 2021-10-11 ENCOUNTER — TELEPHONE (OUTPATIENT)
Dept: INTERNAL MEDICINE | Facility: CLINIC | Age: 51
End: 2021-10-11

## 2021-10-11 NOTE — TELEPHONE ENCOUNTER
----- Message from NICK Hill sent at 10/11/2021 11:46 AM EDT -----  Please notify patient diagnostic mammogram was benign, recommends routine follow up

## 2021-10-14 ENCOUNTER — IMMUNIZATION (OUTPATIENT)
Dept: VACCINE CLINIC | Facility: HOSPITAL | Age: 51
End: 2021-10-14

## 2021-10-14 PROCEDURE — 91300 HC SARSCOV02 VAC 30MCG/0.3ML IM: CPT | Performed by: INTERNAL MEDICINE

## 2021-10-14 PROCEDURE — 0004A ADM SARSCOV2 30MCG/0.3ML BOOSTER: CPT | Performed by: INTERNAL MEDICINE

## 2021-10-18 RX ORDER — METHYLPREDNISOLONE 4 MG/1
TABLET ORAL
Qty: 21 TABLET | Refills: 0 | Status: SHIPPED | OUTPATIENT
Start: 2021-10-18 | End: 2021-12-01

## 2021-10-18 NOTE — TELEPHONE ENCOUNTER
----- Message from Ce Lebron sent at 10/18/2021  8:44 AM EDT -----  Regarding: Back injury  Good morning,    At my appointment,  you started me on a steroid dose pack. This was so helpful and I've been feeling pretty well since.    Since Saturday, the pain is back as bad as before if not worse at times. I've been doing, NSAIDS, rest and ice but no relief. Clearly, I shouldn't be doing laundry and house work. That's the only thing I think I did that may have flared it up again.    I leave on Friday for my vacation and I am wondering if you would prescribe another steroid pack to get the pain back under control? My MRI is scheduled for tomorrow.    Thank you,  Ce Lebron

## 2021-10-19 ENCOUNTER — HOSPITAL ENCOUNTER (OUTPATIENT)
Dept: MRI IMAGING | Facility: HOSPITAL | Age: 51
Discharge: HOME | End: 2021-10-19

## 2021-10-19 ENCOUNTER — APPOINTMENT (OUTPATIENT)
Dept: OTHER | Facility: HOSPITAL | Age: 51
End: 2021-10-19

## 2021-10-19 ENCOUNTER — APPOINTMENT (OUTPATIENT)
Dept: MRI IMAGING | Facility: HOSPITAL | Age: 51
End: 2021-10-19

## 2021-10-19 DIAGNOSIS — M54.40 ACUTE RIGHT-SIDED LOW BACK PAIN WITH SCIATICA, SCIATICA LATERALITY UNSPECIFIED: ICD-10-CM

## 2021-10-19 DIAGNOSIS — Z09 FOLLOW UP: ICD-10-CM

## 2021-10-19 PROCEDURE — 72148 MRI LUMBAR SPINE W/O DYE: CPT

## 2021-10-22 ENCOUNTER — TELEPHONE (OUTPATIENT)
Dept: ORTHOPEDIC SURGERY | Facility: CLINIC | Age: 51
End: 2021-10-22

## 2021-10-22 DIAGNOSIS — M54.40 ACUTE RIGHT-SIDED LOW BACK PAIN WITH SCIATICA, SCIATICA LATERALITY UNSPECIFIED: Primary | ICD-10-CM

## 2021-10-22 NOTE — TELEPHONE ENCOUNTER
Provider: RADHA MARTINEZ   Caller: FREDDY MANCILLA   Relationship to Patient: SELF   Phone Number: [phone]  Reason for Call: PATIENT CALLED TRYING TO RETURN YOUR CALL IS EXPECTING ANOTHER CALL BACK

## 2021-11-11 ENCOUNTER — APPOINTMENT (OUTPATIENT)
Dept: PAIN MEDICINE | Facility: HOSPITAL | Age: 51
End: 2021-11-11

## 2021-11-19 DIAGNOSIS — Z00.00 ROUTINE HEALTH MAINTENANCE: Primary | ICD-10-CM

## 2021-11-19 DIAGNOSIS — R73.03 PREDIABETES: ICD-10-CM

## 2021-11-23 LAB
ALBUMIN SERPL-MCNC: 4.2 G/DL (ref 3.8–4.9)
ALBUMIN/GLOB SERPL: 2.1 {RATIO} (ref 1.2–2.2)
ALP SERPL-CCNC: 87 IU/L (ref 44–121)
ALT SERPL-CCNC: 15 IU/L (ref 0–32)
APPEARANCE UR: ABNORMAL
AST SERPL-CCNC: 14 IU/L (ref 0–40)
BACTERIA #/AREA URNS HPF: ABNORMAL /[HPF]
BASOPHILS # BLD AUTO: 0 X10E3/UL (ref 0–0.2)
BASOPHILS NFR BLD AUTO: 1 %
BILIRUB SERPL-MCNC: 0.2 MG/DL (ref 0–1.2)
BILIRUB UR QL STRIP: NEGATIVE
BUN SERPL-MCNC: 25 MG/DL (ref 6–24)
BUN/CREAT SERPL: 29 (ref 9–23)
CALCIUM SERPL-MCNC: 9.2 MG/DL (ref 8.7–10.2)
CASTS URNS QL MICRO: ABNORMAL /LPF
CHLORIDE SERPL-SCNC: 104 MMOL/L (ref 96–106)
CHOLEST SERPL-MCNC: 200 MG/DL (ref 100–199)
CO2 SERPL-SCNC: 25 MMOL/L (ref 20–29)
COLOR UR: YELLOW
CREAT SERPL-MCNC: 0.85 MG/DL (ref 0.57–1)
EOSINOPHIL # BLD AUTO: 0.2 X10E3/UL (ref 0–0.4)
EOSINOPHIL NFR BLD AUTO: 3 %
EPI CELLS #/AREA URNS HPF: ABNORMAL /HPF (ref 0–10)
ERYTHROCYTE [DISTWIDTH] IN BLOOD BY AUTOMATED COUNT: 13.1 % (ref 11.7–15.4)
GLOBULIN SER CALC-MCNC: 2 G/DL (ref 1.5–4.5)
GLUCOSE SERPL-MCNC: 91 MG/DL (ref 65–99)
GLUCOSE UR QL: NEGATIVE
HBA1C MFR BLD: 6.3 % (ref 4.8–5.6)
HCT VFR BLD AUTO: 38.8 % (ref 34–46.6)
HDLC SERPL-MCNC: 67 MG/DL
HGB BLD-MCNC: 12.3 G/DL (ref 11.1–15.9)
HGB UR QL STRIP: NEGATIVE
IMM GRANULOCYTES # BLD AUTO: 0 X10E3/UL (ref 0–0.1)
IMM GRANULOCYTES NFR BLD AUTO: 0 %
KETONES UR QL STRIP: ABNORMAL
LDLC SERPL CALC-MCNC: 119 MG/DL (ref 0–99)
LDLC/HDLC SERPL: 1.8 RATIO (ref 0–3.2)
LEUKOCYTE ESTERASE UR QL STRIP: ABNORMAL
LYMPHOCYTES # BLD AUTO: 3.5 X10E3/UL (ref 0.7–3.1)
LYMPHOCYTES NFR BLD AUTO: 45 %
MCH RBC QN AUTO: 27.2 PG (ref 26.6–33)
MCHC RBC AUTO-ENTMCNC: 31.7 G/DL (ref 31.5–35.7)
MCV RBC AUTO: 86 FL (ref 79–97)
MICRO URNS: ABNORMAL
MONOCYTES # BLD AUTO: 0.4 X10E3/UL (ref 0.1–0.9)
MONOCYTES NFR BLD AUTO: 6 %
NEUTROPHILS # BLD AUTO: 3.3 X10E3/UL (ref 1.4–7)
NEUTROPHILS NFR BLD AUTO: 45 %
NITRITE UR QL STRIP: NEGATIVE
PH UR STRIP: 6 [PH] (ref 5–7.5)
PLATELET # BLD AUTO: 435 X10E3/UL (ref 150–450)
POTASSIUM SERPL-SCNC: 4.1 MMOL/L (ref 3.5–5.2)
PROT SERPL-MCNC: 6.2 G/DL (ref 6–8.5)
PROT UR QL STRIP: ABNORMAL
RBC # BLD AUTO: 4.53 X10E6/UL (ref 3.77–5.28)
RBC #/AREA URNS HPF: ABNORMAL /HPF (ref 0–2)
SODIUM SERPL-SCNC: 142 MMOL/L (ref 134–144)
SP GR UR: 1.03 (ref 1–1.03)
TRIGL SERPL-MCNC: 75 MG/DL (ref 0–149)
TSH SERPL DL<=0.005 MIU/L-ACNC: 2.04 UIU/ML (ref 0.45–4.5)
UROBILINOGEN UR STRIP-MCNC: 0.2 MG/DL (ref 0.2–1)
VLDLC SERPL CALC-MCNC: 14 MG/DL (ref 5–40)
WBC # BLD AUTO: 7.5 X10E3/UL (ref 3.4–10.8)
WBC #/AREA URNS HPF: >30 /HPF (ref 0–5)

## 2021-11-24 ENCOUNTER — APPOINTMENT (OUTPATIENT)
Dept: PAIN MEDICINE | Facility: HOSPITAL | Age: 51
End: 2021-11-24

## 2021-12-01 ENCOUNTER — TELEPHONE (OUTPATIENT)
Dept: INTERNAL MEDICINE | Facility: CLINIC | Age: 51
End: 2021-12-01

## 2021-12-01 ENCOUNTER — OFFICE VISIT (OUTPATIENT)
Dept: INTERNAL MEDICINE | Facility: CLINIC | Age: 51
End: 2021-12-01

## 2021-12-01 VITALS
OXYGEN SATURATION: 97 % | DIASTOLIC BLOOD PRESSURE: 80 MMHG | SYSTOLIC BLOOD PRESSURE: 120 MMHG | BODY MASS INDEX: 36.02 KG/M2 | HEIGHT: 64 IN | RESPIRATION RATE: 16 BRPM | WEIGHT: 211 LBS | HEART RATE: 67 BPM | TEMPERATURE: 97.8 F

## 2021-12-01 DIAGNOSIS — Z12.11 ENCOUNTER FOR SCREENING COLONOSCOPY: ICD-10-CM

## 2021-12-01 DIAGNOSIS — Z00.00 ANNUAL PHYSICAL EXAM: Primary | ICD-10-CM

## 2021-12-01 PROCEDURE — 93000 ELECTROCARDIOGRAM COMPLETE: CPT | Performed by: NURSE PRACTITIONER

## 2021-12-01 PROCEDURE — 99396 PREV VISIT EST AGE 40-64: CPT | Performed by: NURSE PRACTITIONER

## 2021-12-01 RX ORDER — PHENTERMINE HYDROCHLORIDE 30 MG/1
30 CAPSULE ORAL EVERY MORNING
COMMUNITY
End: 2022-07-15

## 2021-12-01 NOTE — TELEPHONE ENCOUNTER
Please call the patient and let her know that EKG showed prolonged Qt/qtc. She works in cardiology, recommend consult with cardiology , may need to adjust fluoxetine.

## 2021-12-01 NOTE — PROGRESS NOTES
Kiko Lebron is a 50 y.o. female. Patient is here today for   Chief Complaint   Patient presents with   • Annual Exam          Vitals:    21 0748   BP: 120/80   Pulse: 67   Resp: 16   Temp: 97.8 °F (36.6 °C)   SpO2: 97%     Body mass index is 36.22 kg/m².    The following portions of the patient's history were reviewed and updated as appropriate: allergies, current medications, past family history, past medical history, past social history, past surgical history and problem list.    Past Medical History:   Diagnosis Date   • Allergic    • Anemia    • Anxiety    • Arthritis    • Back problem    • Depression    • GERD (gastroesophageal reflux disease)    • Knee problem    • Methicillin resistant Staph aureus culture positive    • Obesity    • Prediabetes    • Tendinitis of right elbow       Allergies   Allergen Reactions   • Avocado Anaphylaxis   • Sulfa Antibiotics Itching   • Adhesive Tape Swelling     Gets some redness and swelling   • Latex Dermatitis, Itching, Rash and Swelling      Social History     Socioeconomic History   • Marital status: Single   Tobacco Use   • Smoking status: Former Smoker     Packs/day: 1.00     Years: 8.00     Pack years: 8.00     Types: Cigarettes     Quit date:      Years since quittin.9   • Smokeless tobacco: Never Used   Vaping Use   • Vaping Use: Never used   Substance and Sexual Activity   • Alcohol use: Yes     Comment: rarely   • Drug use: Never   • Sexual activity: Not Currently        Current Outpatient Medications:   •  albuterol sulfate HFA (ProAir HFA) 108 (90 Base) MCG/ACT inhaler, Inhale 1 puff Every 4 (Four) Hours As Needed for Wheezing or Shortness of Air., Disp: 8.5 g, Rfl: 3  •  buPROPion XL (WELLBUTRIN XL) 300 MG 24 hr tablet, Take 1 tablet by mouth Every Morning., Disp: 90 tablet, Rfl: 3  •  cetirizine (zyrTEC) 10 MG tablet, Take 10 mg by mouth Daily., Disp: , Rfl:   •  Cyanocobalamin (VITAMIN B 12 PO), Take 1,000 mcg by mouth., Disp: , Rfl:    •  cyclobenzaprine (FLEXERIL) 10 MG tablet, Take 1 tablet by mouth 3 (Three) Times a Day As Needed for Muscle Spasms., Disp: 90 tablet, Rfl: 1  •  FLUoxetine (PROzac) 10 MG capsule, Take 1 capsule by mouth Daily., Disp: 90 capsule, Rfl: 1  •  FLUoxetine (PROzac) 20 MG capsule, Take 1 capsule by mouth Daily., Disp: 90 capsule, Rfl: 3  •  fluticasone (FLONASE) 50 MCG/ACT nasal spray, 2 sprays into the nostril(s) as directed by provider Daily., Disp: , Rfl:   •  hydroCHLOROthiazide (HYDRODIURIL) 25 MG tablet, Take 1 tablet by mouth Daily., Disp: 90 tablet, Rfl: 1  •  meloxicam (MOBIC) 15 MG tablet, Take 1 tablet by mouth Daily., Disp: 90 tablet, Rfl: 1  •  phentermine 30 MG capsule, Take 30 mg by mouth Every Morning., Disp: , Rfl:   •  pseudoephedrine (SUDAFED) 30 MG tablet, Take 60 mg by mouth., Disp: , Rfl:   •  spironolactone (Aldactone) 25 MG tablet, Take 1 tablet by mouth Daily., Disp: 90 tablet, Rfl: 1     EKG  ECG Report-   ECG 12 Lead    Date/Time: 12/1/2021 9:03 AM  Performed by: Janis Dior APRN  Authorized by: Janis Dior APRN   Comparison: not compared with previous ECG   Previous ECG: no previous ECG available  Rhythm: sinus rhythm  Rate: normal  Conduction: conduction normal  Other findings: prolonged QTc interval  Other findings comments: qt/qtc 388/475                Objective   History of Present Illness   [facility] 50 y.o. female who presents for an Annual Wellness Visit.  she has a history of   Patient Active Problem List   Diagnosis   • Low back pain   • Morbid obesity with BMI of 45.0-49.9, adult (HCC)   • Depression   • Gastroesophageal reflux disease   • History of tobacco use   • Prediabetes   • Vitamin D deficiency   • Anxiety   • Recurrent major depressive disorder, in full remission ([facility])   .   Labs results discussed in detail with the patient.  Plan to update vaccines if needed today.    Health Habits:  Dental Exam. not up to date - .  Eye Exam. up to date  Exercise: 2  times/week.  Current exercise activities include: walking  Diet is low carb, less than 100 carb per day     Lab Results (most recent)     Orders Only on 11/19/2021   Component Date Value Ref Range Status   • WBC 11/22/2021 7.5  3.4 - 10.8 x10E3/uL Final   • RBC 11/22/2021 4.53  3.77 - 5.28 x10E6/uL Final   • Hemoglobin 11/22/2021 12.3  11.1 - 15.9 g/dL Final   • Hematocrit 11/22/2021 38.8  34.0 - 46.6 % Final   • MCV 11/22/2021 86  79 - 97 fL Final   • MCH 11/22/2021 27.2  26.6 - 33.0 pg Final   • MCHC 11/22/2021 31.7  31.5 - 35.7 g/dL Final   • RDW 11/22/2021 13.1  11.7 - 15.4 % Final   • Platelets 11/22/2021 435  150 - 450 x10E3/uL Final   • Neutrophil Rel % 11/22/2021 45  Not Estab. % Final   • Lymphocyte Rel % 11/22/2021 45  Not Estab. % Final   • Monocyte Rel % 11/22/2021 6  Not Estab. % Final   • Eosinophil Rel % 11/22/2021 3  Not Estab. % Final   • Basophil Rel % 11/22/2021 1  Not Estab. % Final   • Neutrophils Absolute 11/22/2021 3.3  1.4 - 7.0 x10E3/uL Final   • Lymphocytes Absolute 11/22/2021 3.5* 0.7 - 3.1 x10E3/uL Final   • Monocytes Absolute 11/22/2021 0.4  0.1 - 0.9 x10E3/uL Final   • Eosinophils Absolute 11/22/2021 0.2  0.0 - 0.4 x10E3/uL Final   • Basophils Absolute 11/22/2021 0.0  0.0 - 0.2 x10E3/uL Final   • Immature Granulocyte Rel % 11/22/2021 0  Not Estab. % Final   • Immature Grans Absolute 11/22/2021 0.0  0.0 - 0.1 x10E3/uL Final   • Glucose 11/22/2021 91  65 - 99 mg/dL Final   • BUN 11/22/2021 25* 6 - 24 mg/dL Final   • Creatinine 11/22/2021 0.85  0.57 - 1.00 mg/dL Final   • eGFR Non  Am 11/22/2021 79  >59 mL/min/1.73 Final   • eGFR African Am 11/22/2021 91  >59 mL/min/1.73 Final    Comment: **In accordance with recommendations from the NKF-ASN Task force,**    [facility] is in the process of updating its eGFR calculation to the    2021 CKD-EPI creatinine equation that estimates kidney function    without a race variable.     • BUN/Creatinine Ratio 11/22/2021 29* 9 - 23 Final   • Sodium  11/22/2021 142  134 - 144 mmol/L Final   • Potassium 11/22/2021 4.1  3.5 - 5.2 mmol/L Final   • Chloride 11/22/2021 104  96 - 106 mmol/L Final   • Total CO2 11/22/2021 25  20 - 29 mmol/L Final   • Calcium 11/22/2021 9.2  8.7 - 10.2 mg/dL Final   • Total Protein 11/22/2021 6.2  6.0 - 8.5 g/dL Final   • Albumin 11/22/2021 4.2  3.8 - 4.9 g/dL Final   • Globulin 11/22/2021 2.0  1.5 - 4.5 g/dL Final   • A/G Ratio 11/22/2021 2.1  1.2 - 2.2 Final   • Total Bilirubin 11/22/2021 0.2  0.0 - 1.2 mg/dL Final   • Alkaline Phosphatase 11/22/2021 87  44 - 121 IU/L Final                  **Please note reference interval change**   • AST (SGOT) 11/22/2021 14  0 - 40 IU/L Final   • ALT (SGPT) 11/22/2021 15  0 - 32 IU/L Final   • Total Cholesterol 11/22/2021 200* 100 - 199 mg/dL Final   • Triglycerides 11/22/2021 75  0 - 149 mg/dL Final   • HDL Cholesterol 11/22/2021 67  >39 mg/dL Final   • VLDL Cholesterol Leo 11/22/2021 14  5 - 40 mg/dL Final   • LDL Chol Calc ([facility]) 11/22/2021 119* 0 - 99 mg/dL Final   • LDL/HDL RATIO 11/22/2021 1.8  0.0 - 3.2 ratio Final    Comment:                                     LDL/HDL Ratio                                              Men  Women                                1/2 Avg.Risk  1.0    1.5                                    Avg.Risk  3.6    3.2                                 2X Avg.Risk  6.2    5.0                                 3X Avg.Risk  8.0    6.1     • TSH 11/22/2021 2.040  0.450 - 4.500 uIU/mL Final   • Specific Gravity, UA 11/22/2021 1.028  1.005 - 1.030 Final   • pH, UA 11/22/2021 6.0  5.0 - 7.5 Final   • Color, UA 11/22/2021 Yellow  Yellow Final   • Appearance, UA 11/22/2021 Cloudy* Clear Final   • Leukocytes, UA 11/22/2021 3+* Negative Final   • Protein 11/22/2021 1+* Negative/Trace Final   • Glucose, UA 11/22/2021 Negative  Negative Final   • Ketones 11/22/2021 Trace* Negative Final   • Blood, UA 11/22/2021 Negative  Negative Final   • Bilirubin, UA 11/22/2021 Negative  Negative  Final   • Urobilinogen, UA 11/22/2021 0.2  0.2 - 1.0 mg/dL Final   • Nitrite, UA 11/22/2021 Negative  Negative Final   • Microscopic Examination 11/22/2021 See below:   Final    Microscopic was indicated and was performed.   • Hemoglobin A1C 11/22/2021 6.3* 4.8 - 5.6 % Final    Comment:          Prediabetes: 5.7 - 6.4           Diabetes: >6.4           Glycemic control for adults with diabetes: <7.0     • WBC, UA 11/22/2021 >30* 0 - 5 /hpf Final   • RBC, UA 11/22/2021 0-2  0 - 2 /hpf Final   • Epithelial Cells (non renal) 11/22/2021 0-10  0 - 10 /hpf Final   • Casts 11/22/2021 None seen  None seen /lpf Final   • Bacteria, UA 11/22/2021 Moderate* None seen/Few Final                 Review of Systems   Constitutional: Negative for fatigue.   HENT: Negative.    Eyes: Negative for visual disturbance.   Respiratory: Negative.    Cardiovascular: Negative.    Gastrointestinal: Negative for abdominal pain and blood in stool.   Genitourinary: Negative for difficulty urinating, dysuria, pelvic pain and vaginal discharge.   Musculoskeletal: Positive for back pain (scheduled for epidural injection this week ).        Arthralgias in right    Skin: Negative for rash.   Neurological: Negative.    Psychiatric/Behavioral: Negative for dysphoric mood and sleep disturbance. The patient is not nervous/anxious.         Well controlled on current medication        Physical Exam  Vitals and nursing note reviewed.   Constitutional:       General: She is not in acute distress.     Appearance: Normal appearance. She is well-developed and well-groomed.   HENT:      Head: Normocephalic.      Right Ear: Tympanic membrane and ear canal normal.      Left Ear: Tympanic membrane and ear canal normal.      Nose: Nose normal.      Mouth/Throat:      Pharynx: Oropharynx is clear.   Eyes:      General: Lids are normal.      Conjunctiva/sclera: Conjunctivae normal.      Comments: Wearing glasses    Neck:      Thyroid: No thyromegaly.   Cardiovascular:       Rate and Rhythm: Normal rate and regular rhythm.   Pulmonary:      Effort: Pulmonary effort is normal.      Breath sounds: Normal breath sounds.   Musculoskeletal:      Cervical back: Neck supple.   Skin:     General: Skin is warm and dry.   Neurological:      Mental Status: She is alert and oriented to person, place, and time.   Psychiatric:         Mood and Affect: Mood normal.         ASSESSMENT       Problems Addressed this Visit     None      Visit Diagnoses     Annual physical exam    -  Primary    Relevant Orders    ECG 12 Lead    Encounter for screening colonoscopy        Relevant Orders    Ambulatory Referral to General Surgery      Diagnoses       Codes Comments    Annual physical exam    -  Primary ICD-10-CM: Z00.00  ICD-9-CM: V70.0     Encounter for screening colonoscopy     ICD-10-CM: Z12.11  ICD-9-CM: V76.51           PLAN    BP is normal   BMI is 36 continue with healthy diet- pt is seen a weight loss management clinic and is on phentermine, recommend increasing exercise/walking  a1c is 6.3- continue with diet, will recheck in 6 months  UTD on vaccines  Will scheduled with gyn to discuss genetic testing for breast cancer  Pt is due for colonoscopy- referral to general surgery placed   Return in about 6 months (around 6/1/2022) for Recheck, with labs cmp, a1c, lipid panel .

## 2021-12-03 ENCOUNTER — HOSPITAL ENCOUNTER (OUTPATIENT)
Dept: GENERAL RADIOLOGY | Facility: HOSPITAL | Age: 51
Discharge: HOME | End: 2021-12-03

## 2021-12-03 ENCOUNTER — HOSPITAL ENCOUNTER (OUTPATIENT)
Dept: PAIN MEDICINE | Facility: HOSPITAL | Age: 51
Discharge: HOME | End: 2021-12-03

## 2021-12-03 ENCOUNTER — ANESTHESIA EVENT (OUTPATIENT)
Dept: PAIN MEDICINE | Facility: HOSPITAL | Age: 51
End: 2021-12-03

## 2021-12-03 ENCOUNTER — ANESTHESIA (OUTPATIENT)
Dept: PAIN MEDICINE | Facility: HOSPITAL | Age: 51
End: 2021-12-03

## 2021-12-03 VITALS
HEART RATE: 89 BPM | TEMPERATURE: 97.1 F | DIASTOLIC BLOOD PRESSURE: 83 MMHG | RESPIRATION RATE: 16 BRPM | SYSTOLIC BLOOD PRESSURE: 129 MMHG | BODY MASS INDEX: 37.03 KG/M2 | OXYGEN SATURATION: 99 % | WEIGHT: 209 LBS | HEIGHT: 63 IN

## 2021-12-03 DIAGNOSIS — M54.50 LUMBAR PAIN: ICD-10-CM

## 2021-12-03 DIAGNOSIS — G89.29 CHRONIC LOW BACK PAIN WITH BILATERAL SCIATICA, UNSPECIFIED BACK PAIN LATERALITY: Primary | ICD-10-CM

## 2021-12-03 DIAGNOSIS — M54.42 CHRONIC LOW BACK PAIN WITH BILATERAL SCIATICA, UNSPECIFIED BACK PAIN LATERALITY: Primary | ICD-10-CM

## 2021-12-03 DIAGNOSIS — M54.41 CHRONIC LOW BACK PAIN WITH BILATERAL SCIATICA, UNSPECIFIED BACK PAIN LATERALITY: Primary | ICD-10-CM

## 2021-12-03 PROCEDURE — 77003 FLUOROGUIDE FOR SPINE INJECT: CPT

## 2021-12-03 PROCEDURE — 25010000002 METHYLPREDNISOLONE PER 80 MG: Performed by: ANESTHESIOLOGY

## 2021-12-03 RX ORDER — SODIUM CHLORIDE 0.9 % (FLUSH) 0.9 %
1-10 SYRINGE (ML) INJECTION AS NEEDED
Status: DISCONTINUED | OUTPATIENT
Start: 2021-12-03 | End: 2021-12-04 | Stop reason: HOSPADM

## 2021-12-03 RX ORDER — METHYLPREDNISOLONE ACETATE 80 MG/ML
80 INJECTION, SUSPENSION INTRA-ARTICULAR; INTRALESIONAL; INTRAMUSCULAR; SOFT TISSUE ONCE
Status: COMPLETED | OUTPATIENT
Start: 2021-12-03 | End: 2021-12-03

## 2021-12-03 RX ORDER — LIDOCAINE HYDROCHLORIDE 10 MG/ML
1 INJECTION, SOLUTION INFILTRATION; PERINEURAL ONCE AS NEEDED
Status: DISCONTINUED | OUTPATIENT
Start: 2021-12-03 | End: 2021-12-04 | Stop reason: HOSPADM

## 2021-12-03 RX ORDER — FENTANYL CITRATE 50 UG/ML
50 INJECTION, SOLUTION INTRAMUSCULAR; INTRAVENOUS AS NEEDED
Status: DISCONTINUED | OUTPATIENT
Start: 2021-12-03 | End: 2021-12-04 | Stop reason: HOSPADM

## 2021-12-03 RX ORDER — MIDAZOLAM HYDROCHLORIDE 1 MG/ML
1 INJECTION INTRAMUSCULAR; INTRAVENOUS AS NEEDED
Status: DISCONTINUED | OUTPATIENT
Start: 2021-12-03 | End: 2021-12-04 | Stop reason: HOSPADM

## 2021-12-03 RX ADMIN — METHYLPREDNISOLONE ACETATE 80 MG: 80 INJECTION, SUSPENSION INTRA-ARTICULAR; INTRALESIONAL; INTRAMUSCULAR; SOFT TISSUE at 13:44

## 2021-12-03 NOTE — ANESTHESIA PROCEDURE NOTES
PAIN Epidural block      Patient reassessed immediately prior to procedure    Patient location during procedure: pain clinic  Indication:procedure for pain  Performed By  Anesthesiologist: Doug Michel MD  Preanesthetic Checklist  Completed: patient identified and risks and benefits discussed  Additional Notes  Diagnosis:     Post-Op Diagnosis Codes:     * Displacement of lumbar disc with radiculopathy (M51.16)    Sedation:  none    A lumbar epidural steroid injection with fluoroscopic guidance was performed.  Under fluoroscopic guidance, the epidural space was identified and accessed, confirmed by loss of resistance to saline.  The above medications were injected uneventfully.    Prep:  Pt Position:prone  Sterile Tech:cap, gloves, mask and sterile barrier  Prep:chlorhexidine gluconate and isopropyl alcohol  Monitoring:blood pressure monitoring, continuous pulse oximetry and EKG  Procedure:Sedation: no     Approach:midline  Guidance: fluoroscopy  Location:lumbar  Level:4-5  Needle Type:Tuohy  Needle Gauge:20  Aspiration:negative  Medications:  Depomedrol:80  Preservative Free Saline:1mL    Post Assessment:  Pt Tolerance:patient tolerated the procedure well with no apparent complications  Complications:no

## 2021-12-03 NOTE — H&P
HISTORY:    She had a few years of low lumbar back pain radiating into her bilateral lower extremities.  She has had a previous epidural steroid injection at another site that gave excellent relief.  Pain is currently about a 1 out of 10 constant in timing sharp tingling aching in nature.  Worse with activity.  She is tried rest ice and heat.  She is scheduled for physical therapy but she does do some home stretching exercises that do help.  Her MRI shows a displaced disc  Current Outpatient Medications on File Prior to Encounter   Medication Sig Dispense Refill   • albuterol sulfate HFA (ProAir HFA) 108 (90 Base) MCG/ACT inhaler Inhale 1 puff Every 4 (Four) Hours As Needed for Wheezing or Shortness of Air. 8.5 g 3   • buPROPion XL (WELLBUTRIN XL) 300 MG 24 hr tablet Take 1 tablet by mouth Every Morning. 90 tablet 3   • cetirizine (zyrTEC) 10 MG tablet Take 10 mg by mouth Daily.     • Cyanocobalamin (VITAMIN B 12 PO) Take 1,000 mcg by mouth.     • cyclobenzaprine (FLEXERIL) 10 MG tablet Take 1 tablet by mouth 3 (Three) Times a Day As Needed for Muscle Spasms. 90 tablet 1   • FLUoxetine (PROzac) 10 MG capsule Take 1 capsule by mouth Daily. 90 capsule 1   • FLUoxetine (PROzac) 20 MG capsule Take 1 capsule by mouth Daily. 90 capsule 3   • fluticasone (FLONASE) 50 MCG/ACT nasal spray 2 sprays into the nostril(s) as directed by provider Daily.     • hydroCHLOROthiazide (HYDRODIURIL) 25 MG tablet Take 1 tablet by mouth Daily. 90 tablet 1   • meloxicam (MOBIC) 15 MG tablet Take 1 tablet by mouth Daily. 90 tablet 1   • phentermine 30 MG capsule Take 30 mg by mouth Every Morning.     • pseudoephedrine (SUDAFED) 30 MG tablet Take 60 mg by mouth.     • spironolactone (Aldactone) 25 MG tablet Take 1 tablet by mouth Daily. 90 tablet 1     No current facility-administered medications on file prior to encounter.       Past Medical History:   Diagnosis Date   • Allergic    • Anemia    • Anxiety    • Arthritis    • Back problem    •  Depression    • GERD (gastroesophageal reflux disease)    • Knee problem    • Methicillin resistant Staph aureus culture positive    • Obesity    • Prediabetes    • Tendinitis of right elbow        No hematologic infectious or constitutional symptoms  Negative screen for DEVIN      Exam:  There were no vitals taken for this visit.  Airway [place]ampatti 2  Alert and oriented      Diagnosis:  Post-Op Diagnosis Codes:     * Displacement of lumbar disc with radiculopathy [M51.16]    Plan:  Lumbar 4 epidural steroid injection under fluoroscopic guidance    I have encouraged them to continue:  1.  Physical therapy exercises at home as prescribed by physical therapy or from the pain clinic handout (given to the patient).  Continuation of these exercises every day, or multiple times per week, even when the patient has good pain relief, was stressed to the patient as a preventative measure to decrease the frequency and severity of future pain episodes.  2.  Continue pain medicines as already prescribed.  If patient not currently taking any, it is recommended to begin Acetaminophen 1000 mg po q 8 hours.  If other medicines containing Acetaminophen are currently prescribed, maintain daily dose at 3000mg.    3.  If they can tolerate NSAIDS, it is recommended to take Ibuprofen 600 mg po q 6 hours for 7 days during pain exacerbations.   Alternatively, they may substitute an NSAID of their choice (e.g. Aleve)  4.  Heat and ice to the affected area as tolerated for pain control.  It was discussed that heating pads can cause burns.  5.  Low impact exercise such as walking or water exercise was recommended to maintain overall health and aid in weight control.   6.  Follow up as needed for subsequent injections.  7.  Patient was counseled to abstain from tobacco products.

## 2021-12-29 ENCOUNTER — HOSPITAL ENCOUNTER (OUTPATIENT)
Dept: PAIN MEDICINE | Facility: HOSPITAL | Age: 51
Discharge: HOME | End: 2021-12-29

## 2021-12-29 ENCOUNTER — HOSPITAL ENCOUNTER (OUTPATIENT)
Dept: GENERAL RADIOLOGY | Facility: HOSPITAL | Age: 51
Discharge: HOME | End: 2021-12-29

## 2021-12-29 ENCOUNTER — ANESTHESIA EVENT (OUTPATIENT)
Dept: PAIN MEDICINE | Facility: HOSPITAL | Age: 51
End: 2021-12-29

## 2021-12-29 ENCOUNTER — ANESTHESIA (OUTPATIENT)
Dept: PAIN MEDICINE | Facility: HOSPITAL | Age: 51
End: 2021-12-29

## 2021-12-29 VITALS
RESPIRATION RATE: 16 BRPM | SYSTOLIC BLOOD PRESSURE: 142 MMHG | DIASTOLIC BLOOD PRESSURE: 87 MMHG | HEART RATE: 87 BPM | OXYGEN SATURATION: 97 %

## 2021-12-29 DIAGNOSIS — M54.41 CHRONIC LOW BACK PAIN WITH BILATERAL SCIATICA, UNSPECIFIED BACK PAIN LATERALITY: ICD-10-CM

## 2021-12-29 DIAGNOSIS — G89.29 CHRONIC LOW BACK PAIN WITH BILATERAL SCIATICA, UNSPECIFIED BACK PAIN LATERALITY: ICD-10-CM

## 2021-12-29 DIAGNOSIS — R52 PAIN: ICD-10-CM

## 2021-12-29 DIAGNOSIS — M54.42 CHRONIC LOW BACK PAIN WITH BILATERAL SCIATICA, UNSPECIFIED BACK PAIN LATERALITY: ICD-10-CM

## 2021-12-29 DIAGNOSIS — M51.26 DISPLACEMENT OF LUMBAR INTERVERTEBRAL DISC WITHOUT MYELOPATHY: Primary | ICD-10-CM

## 2021-12-29 PROCEDURE — 25010000002 METHYLPREDNISOLONE PER 80 MG: Performed by: ANESTHESIOLOGY

## 2021-12-29 PROCEDURE — 77003 FLUOROGUIDE FOR SPINE INJECT: CPT

## 2021-12-29 RX ORDER — METHYLPREDNISOLONE ACETATE 80 MG/ML
80 INJECTION, SUSPENSION INTRA-ARTICULAR; INTRALESIONAL; INTRAMUSCULAR; SOFT TISSUE ONCE
Status: COMPLETED | OUTPATIENT
Start: 2021-12-29 | End: 2021-12-29

## 2021-12-29 RX ORDER — FENTANYL CITRATE 50 UG/ML
50 INJECTION, SOLUTION INTRAMUSCULAR; INTRAVENOUS AS NEEDED
Status: DISCONTINUED | OUTPATIENT
Start: 2021-12-29 | End: 2021-12-30 | Stop reason: HOSPADM

## 2021-12-29 RX ORDER — LIDOCAINE HYDROCHLORIDE 10 MG/ML
1 INJECTION, SOLUTION INFILTRATION; PERINEURAL ONCE
Status: DISCONTINUED | OUTPATIENT
Start: 2021-12-29 | End: 2021-12-30 | Stop reason: HOSPADM

## 2021-12-29 RX ORDER — MIDAZOLAM HYDROCHLORIDE 1 MG/ML
1 INJECTION INTRAMUSCULAR; INTRAVENOUS
Status: DISCONTINUED | OUTPATIENT
Start: 2021-12-29 | End: 2021-12-30 | Stop reason: HOSPADM

## 2021-12-29 RX ADMIN — METHYLPREDNISOLONE ACETATE 80 MG: 80 INJECTION, SUSPENSION INTRA-ARTICULAR; INTRALESIONAL; INTRAMUSCULAR; SOFT TISSUE at 09:54

## 2021-12-29 NOTE — H&P
INTERVAL HISTORY:    The patient returns for another Lumbar epidural steroid injection 2 today.  They have received 75 % improvement since their last injection with a pain level of 3 /10 at its worst recently.    Conservative measures tried in the interim. Daily activities are still affected by the pain.    Radiology reports and/or previous notes have been reviewed and are consistent with their diagnosis of Post-Op Diagnosis Codes:     * Lumbar disc displacement without myelopathy [M51.26]     * Lumbar radiculopathy [M54.16]    Alert and oriented  MP - 2  Lungs - clear  CV - rrr    Diagnosis:  Post-Op Diagnosis Codes:     * Lumbar disc displacement without myelopathy [M51.26]     * Lumbar radiculopathy [M54.16]      Plan:  Lumbar epidural steroid injection under fluoroscopic guidance        Target : L4-5    I have encouraged them to continue:  1.  Physical therapy exercises at home as prescribed by physical therapy or from the pain clinic handout (given to the patient).  Continuation of these exercises every day, or multiple times per week, even when the patient has good pain relief, was stressed to the patient as a preventative measure to decrease the frequency and severity of future pain episodes.  2.  Continue pain medicines as already prescribed.  If patient not currently taking any, it is recommended to begin Acetaminophen 1000 mg po q 8 hours.  If other medicines containing Acetaminophen are currently prescribed, maintain daily dose at 3000mg.    3.  If they can tolerate NSAIDS, it is recommended to take Ibuprofen 600 mg po q 6 hours for 7 days during pain exacerbations.   Alternatively, they may substitute an NSAID of their choice (e.g. Aleve)  4.  Heat and ice to the affected area as tolerated for pain control.  It was discussed that heating pads can cause burns.  5.  Low impact exercise such as walking or water exercise was recommended to maintain overall health and aid in weight control.   6.  Follow up as  needed for subsequent injections.  7.  Patient was counseled to abstain from tobacco products.    Time :  17  min

## 2021-12-29 NOTE — DISCHARGE INSTRUCTIONS
What can I expect after the procedure?  Follow these instructions at home:  Injection site care  1. You may remove the bandage (dressing) after 24 hours.  2. Check your injection site every day for signs of infection. Check for:  ? Redness, swelling, or pain.  ? Fluid or blood.  ? Warmth.  ? Pus or a bad smell.  Managing pain, stiffness, and swelling  1. For 24 hours after the procedure:  ? Avoid using heat on the injection site.  ? Do not take baths, swim, or use a hot tub. You may take a shower.   2. If directed, put ice on the injection site. To do this:     3.    ? Put ice in a plastic bag.  ? Place a towel between your skin and the bag.  ? Leave the ice on for 20 minutes, 2-3 times a day.     Activity  · NO DRIVING FOR THE REST OF THE DAY IF receiving a sedative during your procedure.  · Return to your normal activities the next day as tolerated.  General instructions  · The injection site may feel sore.  · Take over-the-counter medications as directed on packaging and prescription medicines only as told by your health care provider who prescribes these.  · Keep all follow-up visits as told by your health care provider.   Contact a health care provider if:  1. You have any of these signs of infection:  ? Redness, swelling, or pain around your injection site.  ? Fluid or blood coming from your injection site.  ? Warmth coming from your injection site.  ? Pus or a bad smell coming from your injection site.  ? A fever.  2. You continue to have pain and soreness around the injection site, even after taking over-the-counter pain medicine.  3. You have severe, sudden, or lasting nausea or vomiting.  Get help right away if:  · You have severe pain at the injection site that is not relieved by medicines.  · You develop a severe headache or a stiff neck.  · You become sensitive to light.  · You have any new numbness or weakness in your legs or arms.  · You lose control of your bladder or bowel movements.  · You have  trouble breathing.  Summary  · An epidural steroid block is an injection of steroid medication and numbing medicine that is given into the epidural space.  This injection helps relieve pain caused by an irritated or swollen nerve root.Lumbar Epidural Steroid Injection Instructions  Plan includes:  1.  Lumbar epidural steroid injections, up to 3, spaced 4 weeks apart.  If pain control is acceptable after 1 or 2 injections, it was discussed with the patient that they may return for the subsequent injections if and when their pain returns.  The risks were discussed with the patient including failure of relief, worsening pain, Headache (post dural puncture headache), bleeding (epidural hematoma) and infection (epidural abscess or skin infection).  2.  Physical therapy exercises at home as prescribed by physical therapy or from the pain clinic handout (given to the patient).  Continuation of these exercises every day, or multiple times per week, even when the patient has good pain relief, was stressed to the patient as a preventative measure to decrease the frequency and severity of future pain episodes.  3.  Continue pain medicines as already prescribed.  If patient not currently taking any, it is recommended to begin Acetaminophen 1000 mg po q 8 hours.  If other medicines containing Acetaminophen are currently prescribed, maintain daily dose at 3000 mg.    4.  If they can tolerate NSAIDS, it is recommended to take Ibuprofen 600 mg po q 6 hours for 7 days during pain exacerbations.  Alternatively, they may substitute an NSAID of their choice (e.g. Aleve).  This may be taken at the same time as Acetaminophen.  5.  Heat and ice to the affected area as tolerated for pain control.  It was discussed that heating pads can cause burns.  6.  Daily low impact exercise such as walking or water exercise was recommended to maintain overall health and aid in weight control.   7.  Follow up as needed for subsequent injections.  8.   Patient was counseled to abstain from tobacco products.  Lumbar Epidural Steroid Injection Instructions  Plan includes:  1.  Lumbar epidural steroid injections, up to 3, spaced 4 weeks apart.  If pain control is acceptable after 1 or 2 injections, it was discussed with the patient that they may return for the subsequent injections if and when their pain returns.  The risks were discussed with the patient including failure of relief, worsening pain, Headache (post dural puncture headache), bleeding (epidural hematoma) and infection (epidural abscess or skin infection).  2.  Physical therapy exercises at home as prescribed by physical therapy or from the pain clinic handout (given to the patient).  Continuation of these exercises every day, or multiple times per week, even when the patient has good pain relief, was stressed to the patient as a preventative measure to decrease the frequency and severity of future pain episodes.  3.  Continue pain medicines as already prescribed.  If patient not currently taking any, it is recommended to begin Acetaminophen 1000 mg po q 8 hours.  If other medicines containing Acetaminophen are currently prescribed, maintain daily dose at 3000 mg.    4.  If they can tolerate NSAIDS, it is recommended to take Ibuprofen 600 mg po q 6 hours for 7 days during pain exacerbations.  Alternatively, they may substitute an NSAID of their choice (e.g. Aleve).  This may be taken at the same time as Acetaminophen.  5.  Heat and ice to the affected area as tolerated for pain control.  It was discussed that heating pads can cause burns.  6.  Daily low impact exercise such as walking or water exercise was recommended to maintain overall health and aid in weight control.   7.  Follow up as needed for subsequent injections.  8.  Patient was counseled to abstain from tobacco products.  ·

## 2021-12-29 NOTE — ANESTHESIA PROCEDURE NOTES
PAIN Epidural block    Pre-sedation assessment completed: 12/29/2021 9:47 AM    Patient reassessed immediately prior to procedure    Patient location during procedure: pain clinic  Start Time: 12/29/2021 9:47 AM  Stop Time: 12/29/2021 9:55 AM  Indication:at surgeon's request and procedure for pain  Performed By  Anesthesiologist: Owen Trejo MD  Preanesthetic Checklist  Completed: patient identified, IV checked, site marked, risks and benefits discussed, surgical consent, monitors and equipment checked, pre-op evaluation and timeout performed  Additional Notes  Dx:  Post-Op Diagnosis Codes:     * Lumbar disc displacement without myelopathy (M51.26)     * Lumbar radiculopathy (M54.16)  80 mg depomedrol in epid    Plan : return to clinic as needed  Prep:  Pt Position:prone (prone)  Sterile Tech:cap, gloves, mask and sterile barrier  Prep:chlorhexidine gluconate and isopropyl alcohol  Monitoring:blood pressure monitoring, EKG and continuous pulse oximetry  Procedure:Sedation: no     Approach:midline  Guidance: fluoroscopy and c arm pa and lat and loss of resistance  Location:lumbar  Level:L5-S1 (interlaminar)  Needle Type:[employer] alberto  Needle Gauge:20  Aspiration:negative  Medications:  Preservative Free Saline:3mL  Depomedrol:80 mg  Post Assessment:  Post-procedure: bandaid.  Pt Tolerance:patient tolerated the procedure well with no apparent complications  Complications:no

## 2022-01-26 ENCOUNTER — OFFICE VISIT (OUTPATIENT)
Dept: ORTHOPEDIC SURGERY | Facility: CLINIC | Age: 52
End: 2022-01-26

## 2022-01-26 VITALS — BODY MASS INDEX: 37.78 KG/M2 | HEIGHT: 63 IN | WEIGHT: 213.2 LBS | TEMPERATURE: 97.5 F

## 2022-01-26 DIAGNOSIS — M51.26 HERNIATED LUMBAR INTERVERTEBRAL DISC: ICD-10-CM

## 2022-01-26 DIAGNOSIS — M48.061 SPINAL STENOSIS OF LUMBAR REGION, UNSPECIFIED WHETHER NEUROGENIC CLAUDICATION PRESENT: Primary | ICD-10-CM

## 2022-01-26 PROCEDURE — 99214 OFFICE O/P EST MOD 30 MIN: CPT | Performed by: ORTHOPAEDIC SURGERY

## 2022-01-26 NOTE — PROGRESS NOTES
New patient or new problem visit    CC: Low back pain, left leg pain    HPI: Mostly back pain most of the time but occasionally the leg pain is more intense.  It has been on the right but presently is radiating to the left in the distribution of the buttock and posterior thigh.  Epidural injections x2 helped most recently 1229.  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  No bowel or bladder complaints.  She has had some physical therapy in the past.    PFSH: See attached    ROS: As above    PE: On exam BMI 38.  The back is mildly tender to palpation.  Good strength in the legs bilaterally straight leg raise is negative sensations intact.    XRAY: Plain film x-rays of the lumbar spine obtained 9/23/2021 show well-maintained lordosis, slight well-balanced scoliosis, and multilevel disc degeneration.  MRI obtained recently shows mild multilevel foraminal stenotic changes a tiny disc bulging on the left in the foramen at 5 1 and very minimal stenosis at 4 5.    Other: n/a    Impression/plan: Multilevel lumbar disc degeneration.  This is her main problem.  Must try some physical therapy and see how she does with the back pain.  I will see her back as needed.

## 2022-01-31 ENCOUNTER — TELEMEDICINE (OUTPATIENT)
Dept: FAMILY MEDICINE CLINIC | Facility: TELEHEALTH | Age: 52
End: 2022-01-31

## 2022-01-31 VITALS — WEIGHT: 207 LBS | HEIGHT: 63 IN | TEMPERATURE: 98.9 F | BODY MASS INDEX: 36.68 KG/M2

## 2022-01-31 DIAGNOSIS — R51.9 NONINTRACTABLE HEADACHE, UNSPECIFIED CHRONICITY PATTERN, UNSPECIFIED HEADACHE TYPE: ICD-10-CM

## 2022-01-31 DIAGNOSIS — R05.9 COUGH: ICD-10-CM

## 2022-01-31 DIAGNOSIS — Z11.52 ENCOUNTER FOR SCREENING FOR COVID-19: Primary | ICD-10-CM

## 2022-01-31 PROCEDURE — BHEMPVIDEOVISIT: Performed by: NURSE PRACTITIONER

## 2022-01-31 NOTE — PATIENT INSTRUCTIONS
If you have not already done so, contact Employee Health at [phone] or [phone] and leave a voice message with your full name, employee ID number or date of birth, exact details related to the symptoms you are experiencing and/or exposure (if known). Watch for a return call from [phone] within 24-72 hours and prioritize answering calls from that number.

## 2022-01-31 NOTE — PROGRESS NOTES
You have chosen to receive care through a telehealth visit.  Do you consent to use a video/audio connection for your medical care today? Yes     This visit was performed via telephone encounter    CHIEF COMPLAINT  Cc:covid screening    HPI  Ce Lebron is a 51 y.o. female  presents requesting a COVID-19 screening. She is a [employer] employee. Instructions for calling [employer] have been included in this visit. She has completed the daily screening tool. She has a known work exposure. Her symptoms include; nasal congestion, post nasal drainage, sinus pain and pressure, sore throat, dry cough, chest tightness, muscle aches in her shoulders and neck and headaches. Her symptoms started 01/29/2022. She is vaccinated via two doses of the Pfizer vaccine and the booster.    Review of Systems   Constitutional: Positive for fever. Negative for fatigue.   HENT: Positive for congestion, postnasal drip, rhinorrhea (resolved), sinus pressure, sinus pain and sore throat. Negative for sneezing.         No loss of taste and smell   Respiratory: Positive for cough and chest tightness. Negative for shortness of breath and wheezing.    Cardiovascular: Negative for chest pain.   Gastrointestinal: Negative for diarrhea, nausea and vomiting.   Musculoskeletal: Negative for myalgias (shoulders and neck).   Neurological: Positive for headaches.       Past Medical History:   Diagnosis Date   • Allergic    • Anemia    • Anxiety    • Arthritis    • Back problem    • Depression    • GERD (gastroesophageal reflux disease)    • Knee problem    • Low back pain    • Methicillin resistant Staph aureus culture positive    • Obesity    • Prediabetes    • Tendinitis of right elbow        Family History   Problem Relation Age of Onset   • Stroke Mother    • Hypertension Mother    • Hyperlipidemia Mother    • Cancer Mother    • Diabetes Mother    • Mental illness Mother    • Depression Mother    • Hypertension Father    • Hyperlipidemia Father    •  "Drug abuse Sister    • Depression Sister    • Mental illness Sister    • ADD / ADHD Son    • Asthma Son    • Heart disease Maternal Grandmother    • Colon cancer Maternal Grandfather    • Throat cancer Paternal Grandfather        Social History     Socioeconomic History   • Marital status: Single   Tobacco Use   • Smoking status: Former Smoker     Packs/day: 1.00     Years: 8.00     Pack years: 8.00     Types: Cigarettes     Quit date:      Years since quittin.0   • Smokeless tobacco: Never Used   Vaping Use   • Vaping Use: Never used   Substance and Sexual Activity   • Alcohol use: Yes     Comment: rarely   • Drug use: Never   • Sexual activity: Not Currently         Temp 98.9 °F (37.2 °C)   Ht 160 cm (63\")   Wt 93.9 kg (207 lb)   BMI 36.67 kg/m²     PHYSICAL EXAM  Physical Exam   Constitutional: She is oriented to person, place, and time. She appears well-developed and well-nourished.   HENT:   Head: Normocephalic and atraumatic.   Right Ear: External ear normal.   Left Ear: External ear normal.   Nose: Congestion present. Right sinus exhibits maxillary sinus tenderness (patient reported exam) and frontal sinus tenderness (patient reported exam). Left sinus exhibits maxillary sinus tenderness (patient reported exam) and frontal sinus tenderness (patient reported exam).   Mouth/Throat: Mouth/Lips are normal.Mucous membranes are not erythematous.   Eyes: Lids are normal. Right eye exhibits no discharge and no exudate. Left eye exhibits no discharge and no exudate. Right conjunctiva is not injected. Left conjunctiva is not injected.   Pulmonary/Chest: No accessory muscle usage. No tachypnea and no bradypnea.  No respiratory distress (occasional dry cough at visit).No use of oxygen by nasal cannulaNo use of oxygen by mask noted.  Abdominal: Abdomen appears normal.   Neurological: She is alert and oriented to person, place, and time. No cranial nerve deficit.   Skin: Her skin appears normal.  Psychiatric: " She has a normal mood and affect. Her speech is normal and behavior is normal. Judgment and thought content normal.       Results for orders placed or performed in visit on 11/19/21   Comprehensive Metabolic Panel    Specimen: Blood   Result Value Ref Range    Glucose 91 65 - 99 mg/dL    BUN 25 (H) 6 - 24 mg/dL    Creatinine 0.85 0.57 - 1.00 mg/dL    eGFR Non African Am 79 >59 mL/min/1.73    eGFR African Am 91 >59 mL/min/1.73    BUN/Creatinine Ratio 29 (H) 9 - 23    Sodium 142 134 - 144 mmol/L    Potassium 4.1 3.5 - 5.2 mmol/L    Chloride 104 96 - 106 mmol/L    Total CO2 25 20 - 29 mmol/L    Calcium 9.2 8.7 - 10.2 mg/dL    Total Protein 6.2 6.0 - 8.5 g/dL    Albumin 4.2 3.8 - 4.9 g/dL    Globulin 2.0 1.5 - 4.5 g/dL    A/G Ratio 2.1 1.2 - 2.2    Total Bilirubin 0.2 0.0 - 1.2 mg/dL    Alkaline Phosphatase 87 44 - 121 IU/L    AST (SGOT) 14 0 - 40 IU/L    ALT (SGPT) 15 0 - 32 IU/L   Lipid Panel With LDL / HDL Ratio    Specimen: Blood   Result Value Ref Range    Total Cholesterol 200 (H) 100 - 199 mg/dL    Triglycerides 75 0 - 149 mg/dL    HDL Cholesterol 67 >39 mg/dL    VLDL Cholesterol Leo 14 5 - 40 mg/dL    LDL Chol Calc (NIH) 119 (H) 0 - 99 mg/dL    LDL/HDL RATIO 1.8 0.0 - 3.2 ratio   TSH Rfx On Abnormal To Free T4    Specimen: Blood   Result Value Ref Range    TSH 2.040 0.450 - 4.500 uIU/mL   Hemoglobin A1c    Specimen: Blood   Result Value Ref Range    Hemoglobin A1C 6.3 (H) 4.8 - 5.6 %   Microscopic Examination -   Result Value Ref Range    WBC, UA >30 (A) 0 - 5 /hpf    RBC, UA 0-2 0 - 2 /hpf    Epithelial Cells (non renal) 0-10 0 - 10 /hpf    Casts None seen None seen /lpf    Bacteria, UA Moderate (A) None seen/Few   CBC & Differential    Specimen: Blood   Result Value Ref Range    WBC 7.5 3.4 - 10.8 x10E3/uL    RBC 4.53 3.77 - 5.28 x10E6/uL    Hemoglobin 12.3 11.1 - 15.9 g/dL    Hematocrit 38.8 34.0 - 46.6 %    MCV 86 79 - 97 fL    MCH 27.2 26.6 - 33.0 pg    MCHC 31.7 31.5 - 35.7 g/dL    RDW 13.1 11.7 - 15.4 %     Platelets 435 150 - 450 x10E3/uL    Neutrophil Rel % 45 Not Estab. %    Lymphocyte Rel % 45 Not Estab. %    Monocyte Rel % 6 Not Estab. %    Eosinophil Rel % 3 Not Estab. %    Basophil Rel % 1 Not Estab. %    Neutrophils Absolute 3.3 1.4 - 7.0 x10E3/uL    Lymphocytes Absolute 3.5 (H) 0.7 - 3.1 x10E3/uL    Monocytes Absolute 0.4 0.1 - 0.9 x10E3/uL    Eosinophils Absolute 0.2 0.0 - 0.4 x10E3/uL    Basophils Absolute 0.0 0.0 - 0.2 x10E3/uL    Immature Granulocyte Rel % 0 Not Estab. %    Immature Grans Absolute 0.0 0.0 - 0.1 x10E3/uL   Urinalysis With Microscopic - Urine, Clean Catch    Specimen: Urine, Clean Catch   Result Value Ref Range    Specific Gravity, UA 1.028 1.005 - 1.030    pH, UA 6.0 5.0 - 7.5    Color, UA Yellow Yellow    Appearance, UA Cloudy (A) Clear    Leukocytes, UA 3+ (A) Negative    Protein 1+ (A) Negative/Trace    Glucose, UA Negative Negative    Ketones Trace (A) Negative    Blood, UA Negative Negative    Bilirubin, UA Negative Negative    Urobilinogen, UA 0.2 0.2 - 1.0 mg/dL    Nitrite, UA Negative Negative    Microscopic Examination See below:        Diagnoses and all orders for this visit:    1. Encounter for screening for COVID-19 (Primary)  -     COVID-19,[facility] ROUTINE, NP/OP SWAB IN TRANSPORT MEDIA OR ESWAB 72 HR TAT - Swab, Nasopharynx; Future    2. Nonintractable headache, unspecified chronicity pattern, unspecified headache type  -     COVID-19,[facility] ROUTINE, NP/OP SWAB IN TRANSPORT MEDIA OR ESWAB 72 HR TAT - Swab, Nasopharynx; Future    3. Cough  -     COVID-19,[facility] ROUTINE, NP/OP SWAB IN TRANSPORT MEDIA OR ESWAB 72 HR TAT - Swab, Nasopharynx; Future    May alternate tylenol and ibuprofen  Hydrate well  May take vitamins C, D and Zinc  Alternate rest and mild exercise as tolerated    FOLLOW-UP  As instructed by employee health for return to work    If symptoms worsen or persist follow up with PCP/Virtual Care or Urgent Care    Patient verbalizes understanding of medication  dosage, comfort measures, instructions for treatment and follow-up.    Mary Webster, APRN  01/31/2022  08:08 EST    This visit was performed via Telehealth.  This patient has been instructed to follow-up with their primary care provider if their symptoms worsen or the treatment provided does not resolve their illness.

## 2022-02-25 ENCOUNTER — CLINICAL SUPPORT (OUTPATIENT)
Dept: ORTHOPEDIC SURGERY | Facility: CLINIC | Age: 52
End: 2022-02-25

## 2022-02-25 VITALS — WEIGHT: 207 LBS | TEMPERATURE: 97.4 F | BODY MASS INDEX: 36.68 KG/M2 | HEIGHT: 63 IN

## 2022-02-25 DIAGNOSIS — M17.12 PRIMARY OSTEOARTHRITIS OF LEFT KNEE: Primary | ICD-10-CM

## 2022-02-25 PROCEDURE — 20610 DRAIN/INJ JOINT/BURSA W/O US: CPT | Performed by: NURSE PRACTITIONER

## 2022-02-25 RX ORDER — METHYLPREDNISOLONE ACETATE 80 MG/ML
80 INJECTION, SUSPENSION INTRA-ARTICULAR; INTRALESIONAL; INTRAMUSCULAR; SOFT TISSUE
Status: COMPLETED | OUTPATIENT
Start: 2022-02-25 | End: 2022-02-25

## 2022-02-25 RX ADMIN — METHYLPREDNISOLONE ACETATE 80 MG: 80 INJECTION, SUSPENSION INTRA-ARTICULAR; INTRALESIONAL; INTRAMUSCULAR; SOFT TISSUE at 15:30

## 2022-02-25 NOTE — PROGRESS NOTES
2/25/2022    Ce Lebron is here today for worsening knee pain. Pt has undergone injection of the knee in the past with good resolution of symptoms. Pt is requesting a repeat injection.     KNEE Injection Procedure Note:    Large Joint Arthrocentesis: L knee  Date/Time: 2/25/2022 3:30 PM  Consent given by: patient  Site marked: site marked  Timeout: Immediately prior to procedure a time out was called to verify the correct patient, procedure, equipment, support staff and site/side marked as required   Supporting Documentation  Indications: pain and joint swelling   Procedure Details  Location: knee - L knee  Preparation: Patient was prepped and draped in the usual sterile fashion  Needle size: 22 G  Approach: anterolateral  Medications administered: 80 mg methylPREDNISolone acetate 80 MG/ML; 2 mL lidocaine (cardiac)  Patient tolerance: patient tolerated the procedure well with no immediate complications          At the conclusion of the injection I discussed the importance of continued quad strengthening exercises on a daily basis. I will see the patient back if the symptoms should fail to improve or worsen.    Ana Adkins, APRN  2/25/2022

## 2022-03-29 DIAGNOSIS — F33.42 RECURRENT MAJOR DEPRESSIVE DISORDER, IN FULL REMISSION: ICD-10-CM

## 2022-03-29 DIAGNOSIS — F41.9 ANXIETY: ICD-10-CM

## 2022-03-30 RX ORDER — SPIRONOLACTONE 25 MG/1
25 TABLET ORAL DAILY
Qty: 90 TABLET | Refills: 1 | Status: SHIPPED | OUTPATIENT
Start: 2022-03-30 | End: 2022-07-15 | Stop reason: SDUPTHER

## 2022-03-30 RX ORDER — BUPROPION HYDROCHLORIDE 300 MG/1
300 TABLET ORAL EVERY MORNING
Qty: 90 TABLET | Refills: 3 | Status: SHIPPED | OUTPATIENT
Start: 2022-03-30 | End: 2022-07-15 | Stop reason: DRUGHIGH

## 2022-03-30 RX ORDER — FLUOXETINE 10 MG/1
10 CAPSULE ORAL DAILY
Qty: 90 CAPSULE | Refills: 1 | Status: SHIPPED | OUTPATIENT
Start: 2022-03-30 | End: 2022-09-20 | Stop reason: DRUGHIGH

## 2022-03-30 RX ORDER — HYDROCHLOROTHIAZIDE 25 MG/1
25 TABLET ORAL DAILY
Qty: 90 TABLET | Refills: 1 | Status: SHIPPED | OUTPATIENT
Start: 2022-03-30 | End: 2022-07-15 | Stop reason: SDUPTHER

## 2022-03-30 RX ORDER — FLUOXETINE HYDROCHLORIDE 20 MG/1
20 CAPSULE ORAL DAILY
Qty: 90 CAPSULE | Refills: 3 | Status: SHIPPED | OUTPATIENT
Start: 2022-03-30 | End: 2022-09-20 | Stop reason: SDUPTHER

## 2022-03-30 RX ORDER — MELOXICAM 15 MG/1
15 TABLET ORAL DAILY
Qty: 90 TABLET | Refills: 1 | Status: SHIPPED | OUTPATIENT
Start: 2022-03-30 | End: 2022-12-20 | Stop reason: SDUPTHER

## 2022-05-23 DIAGNOSIS — I10 ESSENTIAL HYPERTENSION: ICD-10-CM

## 2022-05-23 DIAGNOSIS — R73.03 PREDIABETES: Primary | ICD-10-CM

## 2022-05-27 ENCOUNTER — TELEPHONE (OUTPATIENT)
Dept: INTERNAL MEDICINE | Facility: CLINIC | Age: 52
End: 2022-05-27

## 2022-05-27 NOTE — TELEPHONE ENCOUNTER
Caller: Ce Lebron    Relationship to patient: Self    Best call back number: [phone]*    Chief complaint: NONE    Type of visit: NEW PATIENT    Requested date: NEAR FUTURE     Additional notes: PATIENT IS AN REGISTERED NURSE WITH [facility] AND DR. MARQUEZ WAS RECOMMENDED TO HER BY DR. MARQUEZ' CURRENT PATIENT SUN DIAZ  : 1970. THE PATIENT REQUESTING TO BE ACCEPTED AS A NEW PATIENT WITH DR. MARQUEZ.

## 2022-06-10 ENCOUNTER — CLINICAL SUPPORT (OUTPATIENT)
Dept: ORTHOPEDIC SURGERY | Facility: CLINIC | Age: 52
End: 2022-06-10

## 2022-06-10 VITALS — WEIGHT: 227 LBS | BODY MASS INDEX: 40.22 KG/M2 | TEMPERATURE: 96.5 F | HEIGHT: 63 IN

## 2022-06-10 DIAGNOSIS — M17.12 PRIMARY OSTEOARTHRITIS OF LEFT KNEE: Primary | ICD-10-CM

## 2022-06-10 PROCEDURE — 20610 DRAIN/INJ JOINT/BURSA W/O US: CPT | Performed by: NURSE PRACTITIONER

## 2022-06-10 RX ORDER — METHYLPREDNISOLONE ACETATE 80 MG/ML
80 INJECTION, SUSPENSION INTRA-ARTICULAR; INTRALESIONAL; INTRAMUSCULAR; SOFT TISSUE
Status: COMPLETED | OUTPATIENT
Start: 2022-06-10 | End: 2022-06-10

## 2022-06-10 RX ADMIN — METHYLPREDNISOLONE ACETATE 80 MG: 80 INJECTION, SUSPENSION INTRA-ARTICULAR; INTRALESIONAL; INTRAMUSCULAR; SOFT TISSUE at 13:56

## 2022-06-10 NOTE — PROGRESS NOTES
6/10/2022    Ce Lebron is here today for worsening knee pain. Pt has undergone injection of the knee in the past with good resolution of symptoms. Pt is requesting a repeat injection.     KNEE Injection Procedure Note:    Large Joint Arthrocentesis: L knee  Date/Time: 6/10/2022 1:56 PM  Consent given by: patient  Site marked: site marked  Timeout: Immediately prior to procedure a time out was called to verify the correct patient, procedure, equipment, support staff and site/side marked as required   Supporting Documentation  Indications: pain and joint swelling   Procedure Details  Location: knee - L knee  Preparation: Patient was prepped and draped in the usual sterile fashion  Needle size: 22 G  Approach: anterolateral  Medications administered: 80 mg methylPREDNISolone acetate 80 MG/ML; 2 mL lidocaine (cardiac)  Patient tolerance: patient tolerated the procedure well with no immediate complications          At the conclusion of the injection I discussed the importance of continued quad strengthening exercises on a daily basis. I will see the patient back if the symptoms should fail to improve or worsen.    Ana Adkins, APRN  6/10/2022

## 2022-07-15 ENCOUNTER — OFFICE VISIT (OUTPATIENT)
Dept: INTERNAL MEDICINE | Facility: CLINIC | Age: 52
End: 2022-07-15

## 2022-07-15 VITALS
BODY MASS INDEX: 41.46 KG/M2 | TEMPERATURE: 97.3 F | HEART RATE: 76 BPM | DIASTOLIC BLOOD PRESSURE: 74 MMHG | HEIGHT: 63 IN | WEIGHT: 234 LBS | SYSTOLIC BLOOD PRESSURE: 132 MMHG

## 2022-07-15 DIAGNOSIS — F33.42 RECURRENT MAJOR DEPRESSIVE DISORDER, IN FULL REMISSION: Primary | ICD-10-CM

## 2022-07-15 DIAGNOSIS — Z12.11 SCREEN FOR COLON CANCER: ICD-10-CM

## 2022-07-15 DIAGNOSIS — R73.03 PREDIABETES: ICD-10-CM

## 2022-07-15 DIAGNOSIS — Z00.00 ANNUAL PHYSICAL EXAM: ICD-10-CM

## 2022-07-15 PROCEDURE — 99213 OFFICE O/P EST LOW 20 MIN: CPT | Performed by: INTERNAL MEDICINE

## 2022-07-15 RX ORDER — BUPROPION HYDROCHLORIDE 150 MG/1
150 TABLET ORAL DAILY
Qty: 30 TABLET | Refills: 0 | Status: SHIPPED | OUTPATIENT
Start: 2022-07-15 | End: 2022-09-20

## 2022-07-15 RX ORDER — SPIRONOLACTONE AND HYDROCHLOROTHIAZIDE 25; 25 MG/1; MG/1
1 TABLET ORAL DAILY
Qty: 90 TABLET | Refills: 1 | Status: SHIPPED | OUTPATIENT
Start: 2022-07-15 | End: 2023-03-29 | Stop reason: SDUPTHER

## 2022-07-15 NOTE — PROGRESS NOTES
"Chief Complaint  Anxiety and Depression (New PT visit)    Subjective        Ce Lebron presents to [facility]  History of Present Illness  Here to establish- has been taking phentermine via a weight loss clinic but d/c'd and she has gained some of the weight back (started at 270).  Stressors with family things.    Has been on Wellbutrin @ 10 years, Prozac added in the last few years.  Not sure if things are working- the Prozac helps, Wellbutrin? That was increased 2 years ago when her mother had CVA.    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  Saw Dr. Martinez about ongoing back problems- does not recommend surgery, takes nsaid/muscle relaxer, etc.  Did some PT, home exercises. Has gotten worse with back issues.       Objective   Vital Signs:  /74   Pulse 76   Temp 97.3 °F (36.3 °C)   Ht 160 cm (62.99\")   Wt 106 kg (234 lb)   BMI 41.46 kg/m²   Estimated body mass index is 41.46 kg/m² as calculated from the following:    Height as of this encounter: 160 cm (62.99\").    Weight as of this encounter: 106 kg (234 lb).    Class 3 Severe Obesity (BMI >=40). Obesity-related health conditions include the following: osteoarthritis. Obesity is unchanged. BMI is is above average; BMI management plan is completed. We discussed portion control and increasing exercise.      Physical Exam  Constitutional:       Appearance: Normal appearance.   Cardiovascular:      Rate and Rhythm: Normal rate.        Result Review :  The following data was reviewed by: Chhaya Vallejo MD on 07/15/2022:    Data reviewed: previous records          Assessment and Plan   Diagnoses and all orders for this visit:    1. Recurrent major depressive disorder, in full remission (HCC) (Primary)  Comments:  wean down on Wellbutrin call wiht problems.     2. Prediabetes  Comments:  check A1C at f/u- understand association wiht weight, etc  Orders:  -     Hemoglobin A1c    3. Screen for " colon cancer  -     Ambulatory Referral For Screening Colonoscopy    4. Annual physical exam  Comments:  for f/u  Orders:  -     CBC & Differential  -     Comprehensive Metabolic Panel  -     Lipid Panel With / Chol / HDL Ratio  -     Vitamin D 25 Hydroxy  -     TSH    Other orders  -     spironolactone-hydrochlorothiazide (Aldactazide) 25-25 MG tablet; Take 1 tablet by mouth Daily.  Dispense: 90 tablet; Refill: 1  -     buPROPion XL (Wellbutrin XL) 150 MG 24 hr tablet; Take 1 tablet by mouth Daily.  Dispense: 30 tablet; Refill: 0             Follow Up   Return in about 3 months (around 10/15/2022) for Annual physical, Lab Before FUP.  Patient was given instructions and counseling regarding her condition or for health maintenance advice. Please see specific information pulled into the AVS if appropriate.

## 2022-08-08 DIAGNOSIS — Z12.11 SCREEN FOR COLON CANCER: Primary | ICD-10-CM

## 2022-08-09 ENCOUNTER — TRANSCRIBE ORDERS (OUTPATIENT)
Dept: ADMINISTRATIVE | Facility: HOSPITAL | Age: 52
End: 2022-08-09

## 2022-08-09 DIAGNOSIS — Z12.31 SCREENING MAMMOGRAM, ENCOUNTER FOR: Primary | ICD-10-CM

## 2022-08-19 ENCOUNTER — HOSPITAL ENCOUNTER (OUTPATIENT)
Dept: MAMMOGRAPHY | Facility: HOSPITAL | Age: 52
Discharge: HOME | End: 2022-08-19
Admitting: INTERNAL MEDICINE

## 2022-08-19 DIAGNOSIS — Z12.31 SCREENING MAMMOGRAM, ENCOUNTER FOR: ICD-10-CM

## 2022-08-19 PROCEDURE — 77067 SCR MAMMO BI INCL CAD: CPT

## 2022-08-19 PROCEDURE — 77063 BREAST TOMOSYNTHESIS BI: CPT

## 2022-08-24 ENCOUNTER — PRE-PROCEDURE SCREENING (OUTPATIENT)
Dept: GASTROENTEROLOGY | Facility: CLINIC | Age: 52
End: 2022-08-24

## 2022-08-24 NOTE — TELEPHONE ENCOUNTER
Colonoscopy screening--No personal history of polyps--No family history of polyps--Family history of colon cancer ( grandmother)--Aspirin--Medications:            albuterol sulfate HFA (ProAir HFA) 108 (90 Base) MCG/ACT inhaler  buPROPion XL (Wellbutrin XL) 150 MG 24 hr tablet  cetirizine (zyrTEC) 10 MG tablet  Cyanocobalamin (VITAMIN B 12 PO)  cyclobenzaprine (FLEXERIL) 10 MG tablet  FLUoxetine (PROzac) 10 MG capsule  FLUoxetine (PROzac) 20 MG capsule  fluticasone (FLONASE) 50 MCG/ACT nasal spray  meloxicam (MOBIC) 15 MG tablet  pseudoephedrine (SUDAFED) 30 MG tablet  spironolactone-hydrochlorothiazide (Aldactazide) 25-25 MG tablet             Pt verbalized and understood that it would be few weeks before been schedule

## 2022-08-25 ENCOUNTER — PREP FOR SURGERY (OUTPATIENT)
Dept: OTHER | Facility: HOSPITAL | Age: 52
End: 2022-08-25

## 2022-08-25 DIAGNOSIS — Z12.11 ENCOUNTER FOR SCREENING FOR MALIGNANT NEOPLASM OF COLON: Primary | ICD-10-CM

## 2022-09-16 ENCOUNTER — CLINICAL SUPPORT (OUTPATIENT)
Dept: ORTHOPEDIC SURGERY | Facility: CLINIC | Age: 52
End: 2022-09-16

## 2022-09-16 VITALS — HEIGHT: 63 IN | BODY MASS INDEX: 42.75 KG/M2 | WEIGHT: 241.3 LBS | TEMPERATURE: 97.5 F

## 2022-09-16 DIAGNOSIS — M17.12 PRIMARY OSTEOARTHRITIS OF LEFT KNEE: ICD-10-CM

## 2022-09-16 PROCEDURE — 20610 DRAIN/INJ JOINT/BURSA W/O US: CPT | Performed by: NURSE PRACTITIONER

## 2022-09-16 NOTE — PROGRESS NOTES
9/16/2022    Ce Lebron is here today for worsening knee pain. Pt has undergone injection of the knee in the past with good resolution of symptoms. Pt is requesting a repeat injection.     KNEE Injection Procedure Note:    Large Joint Arthrocentesis: L knee  Date/Time: 9/16/2022 1:35 PM  Consent given by: patient  Site marked: site marked  Timeout: Immediately prior to procedure a time out was called to verify the correct patient, procedure, equipment, support staff and site/side marked as required   Supporting Documentation  Indications: pain   Procedure Details  Location: knee - L knee  Preparation: Patient was prepped and draped in the usual sterile fashion  Needle gauge: 21.  Approach: anterolateral  Medications administered: 32 mg Triamcinolone Acetonide 32 MG  Patient tolerance: patient tolerated the procedure well with no immediate complications            At the conclusion of the injection I discussed the importance of continued quad strengthening exercises on a daily basis. I will see the patient back if the symptoms should fail to improve or worsen.    Ana Adkins, NICK      Patient will return the office in 6 weeks for hip bursa injections if needed

## 2022-09-19 ENCOUNTER — HOSPITAL ENCOUNTER (OUTPATIENT)
Dept: CARDIOLOGY | Facility: HOSPITAL | Age: 52
Discharge: HOME | End: 2022-09-19
Admitting: INTERNAL MEDICINE

## 2022-09-19 DIAGNOSIS — E66.01 MORBID OBESITY WITH BMI OF 45.0-49.9, ADULT: ICD-10-CM

## 2022-09-19 DIAGNOSIS — K21.9 GASTROESOPHAGEAL REFLUX DISEASE, UNSPECIFIED WHETHER ESOPHAGITIS PRESENT: ICD-10-CM

## 2022-09-19 DIAGNOSIS — R73.03 PREDIABETES: ICD-10-CM

## 2022-09-19 DIAGNOSIS — F41.9 ANXIETY: Primary | ICD-10-CM

## 2022-09-19 DIAGNOSIS — E55.9 VITAMIN D DEFICIENCY: ICD-10-CM

## 2022-09-19 LAB
ALBUMIN SERPL-MCNC: 4.2 G/DL (ref 3.5–5.2)
ALBUMIN/GLOB SERPL: 1.7 G/DL
ALP SERPL-CCNC: 75 U/L (ref 39–117)
ALT SERPL W P-5'-P-CCNC: 17 U/L (ref 1–33)
ANION GAP SERPL CALCULATED.3IONS-SCNC: 9.3 MMOL/L (ref 5–15)
AST SERPL-CCNC: 15 U/L (ref 1–32)
BASOPHILS # BLD AUTO: 0.04 10*3/MM3 (ref 0–0.2)
BASOPHILS NFR BLD AUTO: 0.5 % (ref 0–1.5)
BILIRUB SERPL-MCNC: 0.3 MG/DL (ref 0–1.2)
BUN SERPL-MCNC: 23 MG/DL (ref 6–20)
BUN/CREAT SERPL: 27.4 (ref 7–25)
CALCIUM SPEC-SCNC: 9.3 MG/DL (ref 8.6–10.5)
CHLORIDE SERPL-SCNC: 103 MMOL/L (ref 98–107)
CO2 SERPL-SCNC: 25.7 MMOL/L (ref 22–29)
CREAT SERPL-MCNC: 0.84 MG/DL (ref 0.57–1)
DEPRECATED RDW RBC AUTO: 43.4 FL (ref 37–54)
EGFRCR SERPLBLD CKD-EPI 2021: 83.2 ML/MIN/1.73
EOSINOPHIL # BLD AUTO: 0.21 10*3/MM3 (ref 0–0.4)
EOSINOPHIL NFR BLD AUTO: 2.7 % (ref 0.3–6.2)
ERYTHROCYTE [DISTWIDTH] IN BLOOD BY AUTOMATED COUNT: 14.3 % (ref 12.3–15.4)
GLOBULIN UR ELPH-MCNC: 2.5 GM/DL
GLUCOSE SERPL-MCNC: 88 MG/DL (ref 65–99)
HBA1C MFR BLD: 6.1 % (ref 4.8–5.6)
HCT VFR BLD AUTO: 38.4 % (ref 34–46.6)
HGB BLD-MCNC: 12.3 G/DL (ref 12–15.9)
IMM GRANULOCYTES # BLD AUTO: 0.02 10*3/MM3 (ref 0–0.05)
IMM GRANULOCYTES NFR BLD AUTO: 0.3 % (ref 0–0.5)
LYMPHOCYTES # BLD AUTO: 3.44 10*3/MM3 (ref 0.7–3.1)
LYMPHOCYTES NFR BLD AUTO: 44.3 % (ref 19.6–45.3)
MCH RBC QN AUTO: 26.8 PG (ref 26.6–33)
MCHC RBC AUTO-ENTMCNC: 32 G/DL (ref 31.5–35.7)
MCV RBC AUTO: 83.7 FL (ref 79–97)
MONOCYTES # BLD AUTO: 0.49 10*3/MM3 (ref 0.1–0.9)
MONOCYTES NFR BLD AUTO: 6.3 % (ref 5–12)
NEUTROPHILS NFR BLD AUTO: 3.57 10*3/MM3 (ref 1.7–7)
NEUTROPHILS NFR BLD AUTO: 45.9 % (ref 42.7–76)
NRBC BLD AUTO-RTO: 0 /100 WBC (ref 0–0.2)
PLATELET # BLD AUTO: 348 10*3/MM3 (ref 140–450)
PMV BLD AUTO: 8.8 FL (ref 6–12)
POTASSIUM SERPL-SCNC: 4.1 MMOL/L (ref 3.5–5.2)
PROT SERPL-MCNC: 6.7 G/DL (ref 6–8.5)
RBC # BLD AUTO: 4.59 10*6/MM3 (ref 3.77–5.28)
SODIUM SERPL-SCNC: 138 MMOL/L (ref 136–145)
TSH SERPL DL<=0.05 MIU/L-ACNC: 1.91 UIU/ML (ref 0.27–4.2)
WBC NRBC COR # BLD: 7.77 10*3/MM3 (ref 3.4–10.8)

## 2022-09-19 PROCEDURE — 83036 HEMOGLOBIN GLYCOSYLATED A1C: CPT | Performed by: INTERNAL MEDICINE

## 2022-09-19 PROCEDURE — 36415 COLL VENOUS BLD VENIPUNCTURE: CPT

## 2022-09-19 PROCEDURE — 80050 GENERAL HEALTH PANEL: CPT

## 2022-09-20 ENCOUNTER — OFFICE VISIT (OUTPATIENT)
Dept: INTERNAL MEDICINE | Facility: CLINIC | Age: 52
End: 2022-09-20

## 2022-09-20 VITALS
TEMPERATURE: 97.3 F | HEART RATE: 72 BPM | DIASTOLIC BLOOD PRESSURE: 90 MMHG | BODY MASS INDEX: 42.35 KG/M2 | HEIGHT: 63 IN | WEIGHT: 239 LBS | SYSTOLIC BLOOD PRESSURE: 138 MMHG

## 2022-09-20 DIAGNOSIS — R73.03 PREDIABETES: ICD-10-CM

## 2022-09-20 DIAGNOSIS — F41.9 ANXIETY: ICD-10-CM

## 2022-09-20 DIAGNOSIS — E66.01 MORBID OBESITY WITH BODY MASS INDEX (BMI) OF 40.0 TO 44.9 IN ADULT: ICD-10-CM

## 2022-09-20 DIAGNOSIS — F33.42 RECURRENT MAJOR DEPRESSIVE DISORDER, IN FULL REMISSION: Primary | ICD-10-CM

## 2022-09-20 PROCEDURE — 99214 OFFICE O/P EST MOD 30 MIN: CPT | Performed by: INTERNAL MEDICINE

## 2022-09-20 RX ORDER — FLUOXETINE HYDROCHLORIDE 40 MG/1
40 CAPSULE ORAL DAILY
Qty: 90 CAPSULE | Refills: 1 | Status: SHIPPED | OUTPATIENT
Start: 2022-09-20

## 2022-09-20 NOTE — PROGRESS NOTES
"Chief Complaint  Anxiety    Subjective        Ce Lebron presents to [facility]  History of Present Illness  Here for general f/u- held Wellbutrin- wasn't sure how helpful it was- feels a bit more weepy but not a big enough difference to say it's cause/effect. Thinks Prozac is ok.   No BP checks.   Watching diet a bit, could do better.   She is waiting to hear about her c-scope.  Gel inj in her knee to hekp get her moving.     Objective   Vital Signs:  /90   Pulse 72   Temp 97.3 °F (36.3 °C)   Ht 160 cm (62.99\")   Wt 108 kg (239 lb)   BMI 42.35 kg/m²   Estimated body mass index is 42.35 kg/m² as calculated from the following:    Height as of this encounter: 160 cm (62.99\").    Weight as of this encounter: 108 kg (239 lb).          Physical Exam  Constitutional:       Appearance: Normal appearance.   Cardiovascular:      Rate and Rhythm: Normal rate and regular rhythm.   Pulmonary:      Effort: Pulmonary effort is normal.   Musculoskeletal:      Right lower leg: No edema.      Left lower leg: No edema.   Neurological:      General: No focal deficit present.   Psychiatric:         Mood and Affect: Mood normal.        Result Review :  The following data was reviewed by: Chhaya Vallejo MD on 09/20/2022:  Common labs    Common Labs 11/22/21 11/22/21 11/22/21 11/22/21 9/19/22 9/19/22 9/19/22    0801 0801 0801 0801 1000 1000 1000   Glucose  91    88    BUN  25 (A)    23 (A)    Creatinine  0.85    0.84    eGFR Non  Am  79        eGFR African Am  91        Sodium  142    138    Potassium  4.1    4.1    Chloride  104    103    Calcium  9.2    9.3    Total Protein  6.2        Albumin  4.2    4.20    Total Bilirubin  0.2    0.3    Alkaline Phosphatase  87    75    AST (SGOT)  14    15    ALT (SGPT)  15    17    WBC 7.5    7.77     Hemoglobin 12.3    12.3     Hematocrit 38.8    38.4     Platelets 435    348     Total Cholesterol   200 (A)       Triglycerides   75       HDL " Cholesterol   67       LDL Cholesterol    119 (A)       Hemoglobin A1C    6.3 (A)   6.10 (A)   (A) Abnormal value       Comments are available for some flowsheets but are not being displayed.                     Assessment and Plan   Diagnoses and all orders for this visit:    1. Recurrent major depressive disorder, in full remission (HCC) (Primary)  Comments:  increase dose of Prozac to 40 mg daily.   Orders:  -     FLUoxetine (PROzac) 40 MG capsule; Take 1 capsule by mouth Daily.  Dispense: 90 capsule; Refill: 1    2. Anxiety  Comments:  as above  Orders:  -     FLUoxetine (PROzac) 40 MG capsule; Take 1 capsule by mouth Daily.  Dispense: 90 capsule; Refill: 1    3. Prediabetes  Comments:  A1C stable- discussed diet.     4. Body mass index (BMI) of 40.0 to 44.9 in adult (HCC)  Comments:  discouraged phentermine- decided trial of Saxenda- along with diet and exercise.               Follow Up   Return in about 6 weeks (around 11/1/2022) for Recheck.  Patient was given instructions and counseling regarding her condition or for health maintenance advice. Please see specific information pulled into the AVS if appropriate.

## 2022-09-22 ENCOUNTER — TELEPHONE (OUTPATIENT)
Dept: INTERNAL MEDICINE | Facility: CLINIC | Age: 52
End: 2022-09-22

## 2022-09-22 NOTE — TELEPHONE ENCOUNTER
Caller: BRAENNE    Relationship: [facility]    Best call back number: [phone]    Who are you requesting to speak with (clinical staff, provider,  specific staff member): DR OTTONIEL MARQUEZ OR MA    What was the call regarding: BREANNE WITH [facility] STATES THAT PATIENT WILL REQUIRE A PRIOR AUTHORIZATION FOR HER PRESCRIPTION FOR Liraglutide (SAXENDA) 18 MG/3ML injection pen    Do you require a callback: NO

## 2022-09-23 ENCOUNTER — RX ONLY (OUTPATIENT)
Age: 52
Setting detail: RX ONLY
End: 2022-09-23

## 2022-10-18 ENCOUNTER — PREP FOR SURGERY (OUTPATIENT)
Dept: OTHER | Facility: HOSPITAL | Age: 52
End: 2022-10-18

## 2022-10-18 ENCOUNTER — CLINICAL SUPPORT (OUTPATIENT)
Dept: ORTHOPEDIC SURGERY | Facility: CLINIC | Age: 52
End: 2022-10-18

## 2022-10-18 VITALS — TEMPERATURE: 97.8 F | WEIGHT: 247.2 LBS | BODY MASS INDEX: 43.8 KG/M2 | HEIGHT: 63 IN

## 2022-10-18 DIAGNOSIS — R52 PAIN: ICD-10-CM

## 2022-10-18 DIAGNOSIS — M17.12 PRIMARY OSTEOARTHRITIS OF LEFT KNEE: ICD-10-CM

## 2022-10-18 DIAGNOSIS — M17.12 PRIMARY OSTEOARTHRITIS OF LEFT KNEE: Primary | ICD-10-CM

## 2022-10-18 PROCEDURE — 99214 OFFICE O/P EST MOD 30 MIN: CPT | Performed by: NURSE PRACTITIONER

## 2022-10-18 PROCEDURE — 73562 X-RAY EXAM OF KNEE 3: CPT | Performed by: NURSE PRACTITIONER

## 2022-10-18 PROCEDURE — 73522 X-RAY EXAM HIPS BI 3-4 VIEWS: CPT | Performed by: NURSE PRACTITIONER

## 2022-10-18 RX ORDER — POVIDONE-IODINE 10 MG/ML
SOLUTION TOPICAL ONCE
Status: CANCELLED | OUTPATIENT
Start: 2023-02-27 | End: 2022-10-18

## 2022-10-18 RX ORDER — PREGABALIN 75 MG/1
150 CAPSULE ORAL ONCE
Status: CANCELLED | OUTPATIENT
Start: 2023-02-27 | End: 2022-10-18

## 2022-10-18 RX ORDER — CHLORHEXIDINE GLUCONATE 500 MG/1
CLOTH TOPICAL 2 TIMES DAILY
Status: CANCELLED | OUTPATIENT
Start: 2022-10-18

## 2022-10-18 RX ORDER — MELOXICAM 15 MG/1
15 TABLET ORAL ONCE
Status: CANCELLED | OUTPATIENT
Start: 2023-02-27 | End: 2022-10-18

## 2022-10-18 RX ORDER — CEFAZOLIN SODIUM 2 G/100ML
2 INJECTION, SOLUTION INTRAVENOUS ONCE
Status: CANCELLED | OUTPATIENT
Start: 2023-02-27 | End: 2022-10-18

## 2022-10-18 NOTE — PROGRESS NOTES
Patient: Ce Lebron  YOB: 1970 51 y.o. female  Medical Record Number: [id number]    Chief Complaints:   Chief Complaint   Patient presents with   • Right Hip - Pain, Injections   • Left Hip - Pain, Injections       History of Present Illness:Ce Lebron is a 51 y.o. female who presents with complaints of bilateral lateral hip pain.  She does have a history of hip bursitis, does well with injections, has reported some increased soreness over the last few weeks.  In addition she has significant osteoarthritis bilateral knees left greater than right.  Last injection did not really help very much, pain is definitely worsening, she would like to proceed with surgery    Allergies:   Allergies   Allergen Reactions   • Avocado Anaphylaxis   • Sulfa Antibiotics Itching   • Adhesive Tape Swelling     Gets some redness and swelling   • Latex Dermatitis, Itching, Rash and Swelling       Medications:   Current Outpatient Medications   Medication Sig Dispense Refill   • albuterol sulfate HFA (ProAir HFA) 108 (90 Base) MCG/ACT inhaler Inhale 1 puff Every 4 (Four) Hours As Needed for Wheezing or Shortness of Air. 8.5 g 3   • cetirizine (zyrTEC) 10 MG tablet Take 10 mg by mouth Daily.     • Cyanocobalamin (VITAMIN B 12 PO) Take 1,000 mcg by mouth.     • cyclobenzaprine (FLEXERIL) 10 MG tablet Take 1 tablet by mouth 3 (Three) Times a Day As Needed for Muscle Spasms. 90 tablet 1   • FLUoxetine (PROzac) 40 MG capsule Take 1 capsule by mouth Daily. 90 capsule 1   • fluticasone (FLONASE) 50 MCG/ACT nasal spray 2 sprays into the nostril(s) as directed by provider Daily.     • Insulin Pen Needle 32G X 4 MM misc Use daily with saxenda 50 each 1   • meloxicam (MOBIC) 15 MG tablet Take 1 tablet by mouth Daily. 90 tablet 1   • pseudoephedrine (SUDAFED) 30 MG tablet Take 60 mg by mouth.     • spironolactone-hydrochlorothiazide (Aldactazide) 25-25 MG tablet Take 1 tablet by mouth Daily. 90 tablet 1   • Liraglutide (SAXENDA) 18  "MG/3ML injection pen Inject 0.6mg under skin daily for week one, THEN 1.2mg daily for week two, THEN 1.8mg daily for week three, then 2.4mg daily for week four. 3 mL 0     No current facility-administered medications for this visit.         The following portions of the patient's history were reviewed and updated as appropriate: allergies, current medications, past family history, past medical history, past social history, past surgical history and problem list.    Review of Systems:   A 14 point review of systems was performed. All systems negative except pertinent positives/negative listed in HPI above    Physical Exam:   Vitals:    10/18/22 0812   Temp: 97.8 °F (36.6 °C)   Weight: 112 kg (247 lb 3.2 oz)   Height: 160 cm (63\")   PainSc:   4   PainLoc: Hip       General: A and O x 3, ASA, NAD    SCLERA:    Normal    Skin clear no unusual lesions noted  Bilateral hips patient is very tender over bilateral hip greater trochanteric bursa  Bilateral knees patient does have trace amount of effusion noted left knee none on the right with 116 degrees flexion neutral extension with a positive Ignacio negative Lockman calf soft and nontender       Radiology:  Xrays 3views (ap,lateral, sunrise) left knee and 2 views bilateral hips were ordered and reviewed today secondary to pain the patient has bone-on-bone end-stage osteoarthritis bilateral knees compared to views hip show definite progression in arthritic changes minimal arthritic changes noted bilateral hips.  Compared to views are unchanged    Assessment/Plan: Bilateral hip greater trochanteric bursitis  End-stage osteoarthritis left knee with increasing pain    The patient and I discussed options, Dr. Coy also saw the patient, she would like to proceed with left total knee replacement same day home health.  In addition we will proceed with bilateral hip bursa injections, physical therapy.  Continuation of conservative management vs. TKA discussed.  The patient " wishes to proceed with total knee replacement.  At this point the patient has failed the full compliment of conservative treatment and stating complete understanding of the risks/benefits/ anternatives wishes to proceed with surgical treatment.    Risk and benefits of surgery were reviewed.  Including, but not limited to, blood clots or pulmonary embolism, anesthesia risk, infection, fracture, skin/leg numbness, persistent pain/crepitance/popping/catching, failure of the implant, need for future surgeries, hematoma, possible nerve or blood vessel injury, need for transfusion, and potential risk of stroke,heart attack or death, among others.  The patient understands and wishes to proceed.     It was explained that if tissue has been repaired or reconstructed, there is also an increased chance of failure which may require further management.  Following the completion of the discussion, the patient expressed understanding of this planned course of care, all their questions were answered and consent will be obtained preoperatively.    Operative Plan: Smith and Nephhumberto Oxinium Total Knee Replacement performing the procedure on an outpatient basiswith home health rehab        Ana Adkins, APRN  10/18/2022

## 2022-10-20 PROBLEM — M17.12 PRIMARY OSTEOARTHRITIS OF LEFT KNEE: Status: ACTIVE | Noted: 2022-10-20

## 2022-10-27 ENCOUNTER — TELEPHONE (OUTPATIENT)
Dept: INTERNAL MEDICINE | Facility: CLINIC | Age: 52
End: 2022-10-27

## 2022-11-09 ENCOUNTER — HOSPITAL ENCOUNTER (OUTPATIENT)
Dept: PHYSICAL THERAPY | Facility: HOSPITAL | Age: 52
Setting detail: THERAPIES SERIES
Discharge: HOME | End: 2022-11-09

## 2022-11-09 DIAGNOSIS — M17.12 PRIMARY OSTEOARTHRITIS OF LEFT KNEE: ICD-10-CM

## 2022-11-09 DIAGNOSIS — G89.29 CHRONIC PAIN OF LEFT KNEE: Primary | ICD-10-CM

## 2022-11-09 DIAGNOSIS — R26.2 DIFFICULTY WALKING: ICD-10-CM

## 2022-11-09 DIAGNOSIS — M25.562 CHRONIC PAIN OF LEFT KNEE: Primary | ICD-10-CM

## 2022-11-09 PROCEDURE — 97161 PT EVAL LOW COMPLEX 20 MIN: CPT | Performed by: PHYSICAL THERAPIST

## 2022-11-09 PROCEDURE — 97110 THERAPEUTIC EXERCISES: CPT | Performed by: PHYSICAL THERAPIST

## 2022-11-09 NOTE — THERAPY EVALUATION
Outpatient Physical Therapy Ortho Initial Evaluation  [facility]     Patient Name: Ce Lebron  : 1970  MRN: [id number]  Today's Date: 2022      Visit Date: 2022    Patient Active Problem List   Diagnosis   • Low back pain   • Body mass index (BMI) of 40.0 to 44.9 in adult ([facility])   • Depression   • Gastroesophageal reflux disease   • History of tobacco use   • Prediabetes   • Vitamin D deficiency   • Anxiety   • Recurrent major depressive disorder, in full remission ([facility])   • Primary osteoarthritis of left knee        Past Medical History:   Diagnosis Date   • Allergic    • Anemia    • Anxiety    • Arthritis    • Back problem    • Depression    • GERD (gastroesophageal reflux disease)    • Knee problem    • Low back pain    • Methicillin resistant Staph aureus culture positive    • Obesity    • Prediabetes    • Tendinitis of right elbow         Past Surgical History:   Procedure Laterality Date   • ADENOIDECTOMY     • APPENDECTOMY  2012   •  SECTION     • DIAGNOSTIC LAPAROSCOPY, SALPINGO OOPHORECTOMY LAPAROSCOPIC     • ENDOMETRIAL ABLATION  10/2012   • EPIDURAL BLOCK     • HYSTERECTOMY     • LAPAROSCOPIC CHOLECYSTECTOMY  2005   • LASIK     • OOPHORECTOMY     • TONSILLECTOMY     • VENTRAL HERNIA REPAIR  2016       Visit Dx:     ICD-10-CM ICD-9-CM   1. Chronic pain of left knee  M25.562 719.46    G89.29 338.29   2. Primary osteoarthritis of left knee  M17.12 715.16   3. Difficulty walking  R26.2 719.7          Patient History     Row Name 22 0830             History    Chief Complaint Pain  -JS      Type of Pain Knee pain;Hip pain  -JS      Date Current Problem(s) Began 22  -JS      Brief Description of Current Complaint Pt with chronic history of B knee pain, L>R x past 6 years. Pt notes progressive worsening of symptoms with history of knee injections. Pt notes that she feels that injections no longer seem to help. Has had PT in the past but  none over the past couple of years. Pt with history of R gastroc tear in 2018, required use of boot on R. Following that period of time, pt developed B hip bursitis that has been ongoing. Pt recently received B hip injections which seems like it has helped. Pt notes difficulty walking, has 3 stairs to enter home, 13 stairs inside with bedroom on 2nd floor. Lives alone. Works as an RN.  Pt is scheduled for surgery 2/27/23- presents to PT for pre-op therapy.  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  Enjoys [employer], walking dog.  -JS      What clinical tests have you had for this problem? X-ray;MRI  -JS         Pain     Pain Location Knee  -JS      Pain at Present 0  -JS      Pain at Best 0  -JS      Pain at Worst 5;6  -JS      What Performance Factors Make the Current Problem(s) WORSE? Walking, stairs, lunging forward  -JS      What Performance Factors Make the Current Problem(s) BETTER? Ice  -JS      Is your sleep disturbed? Yes  intermittently  -JS      Difficulties at work? Walking, prolonged standing  -JS      Difficulties with ADL's? Walking, stairs, prolonged standing  -JS      Difficulties with recreational activities? Unable to walk dog or resume [employer]  -JS         Fall Risk Assessment    Any falls in the past year: No  -JS         Daily Activities    Primary Language English  -JS      Pt Participated in POC and Goals Yes  -JS         Safety    Are you being hurt, hit, or frightened by anyone at home or in your life? No  -JS      Are you being neglected by a caregiver No  -JS            User Key  (r) = Recorded By, (t) = Taken By, (c) = Cosigned By    Initials Name Provider Type    Minda Man PT Physical Therapist                 PT Ortho     Row Name 11/09/22 8503       Subjective  Comments    Subjective Comments Pt scheduled for L TKR on 2/27/23.  -JS       Subjective Pain    Able to rate subjective pain? yes  -JS    Subjective Pain Comment 0/10 at rest, 5-6/10 with activity L knee  -JS       Posture/Observations    Alignment Options Forward head;Rounded shoulders;Genu varus  -JS    Forward Head Mild  -JS    Rounded Shoulders Moderate  -JS    Genu varus Bilateral:;Moderate  -JS       Quarter Clearing    Quarter Clearing Lower Quarter Clearing  -JS       Myotomal Screen- Lower Quarter Clearing    Hip flexion (L2) Bilateral:;5 (Normal)  -JS    Knee extension (L3) Bilateral:;5 (Normal)  -JS    Ankle DF (L4) Bilateral:;5 (Normal)  -JS    Ankle PF (S1) Bilateral:;5 (Normal)  -JS    Knee flexion (S2) Bilateral:;5 (Normal)  -JS       General ROM    RT Lower Ext Rt Knee Extension/Flexion  -JS    LT Lower Ext Lt Knee Extension/Flexion  -JS       Right Lower Ext    Rt Knee Extension/Flexion AROM 0-130  -JS    RT Lower Extremity Comments knee pain endrange flex  -JS       Left Lower Ext    Lt Knee Extension/Flexion AROM 0-125  -JS       MMT (Manual Muscle Testing)    Rt Lower Ext Rt Hip Extension;Rt Hip ABduction  -JS    Lt Lower Ext Lt Hip Extension;Lt Hip ABduction  -JS       MMT Right Lower Ext    Rt Hip Extension MMT, Gross Movement (3+/5) fair plus  -JS    Rt Hip ABduction MMT, Gross Movement (4/5) good  -JS       MMT Left Lower Ext    Lt Hip Extension MMT, Gross Movement (3+/5) fair plus  -JS    Lt Hip ABduction MMT, Gross Movement (3+/5) fair plus  -JS       Flexibility    Flexibility Tested? Lower Extremity  -JS       Lower Extremity Flexibility    Hamstrings Bilateral:;Moderately limited  -JS    Quadriceps Bilateral:;Moderately limited  -JS    Gastrocnemius Bilateral:;Mildly limited  -JS       Gait/Stairs (Locomotion)    [place] Level (Stairs) independent  -JS    Handrail Location (Stairs) right side (ascending)  -JS    Number of Steps (Stairs) 4  -JS    Ascending Technique (Stairs)  step-over-step  -JS    Descending Technique (Stairs) step-over-step;step-to-step  pain & decreased eccentric quad control L descending  -JS    Comment, (Gait/Stairs) Amb without AD with B compensatory trunk lean, decreased weight shift, decreased trunk rotation, widened KWESI, genu varum noted.  -JS          User Key  (r) = Recorded By, (t) = Taken By, (c) = Cosigned By    Initials Name Provider Type    Minda Man, PT Physical Therapist                            Therapy Education  Education Details: Role of outpatient PT, POC, differential diagnosis, initial HEP, expectations.  Printed & emailed HEP via [id number].  Encouraged continued use of ice to knee for symptom management.  Given: HEP  Program: New  How Provided: Verbal, Demonstration, Written  Provided to: Patient  Level of Understanding: Teach back education performed, Verbalized, Demonstrated      PT OP Goals     Row Name 11/09/22 0830          Long Term Goals    LTG Date to Achieve 12/07/22  -JS     LTG 1 Pt will be independent in comprehensive HEP to allow ongoing self management of condition and prepare for upcoming L TKA.  -JS     LTG 1 Progress New  -JS     LTG 2 Patient will demonstrate proper use and understanding of assistive device for gait and stairs to prepare for upcoming L TKA.  -JS     LTG 2 Progress New  -JS           User Key  (r) = Recorded By, (t) = Taken By, (c) = Cosigned By    Initials Name Provider Type    Minda Man, PT Physical Therapist                 PT Assessment/Plan     Row Name 11/09/22 0830          PT Assessment    Impairments Gait;Balance;Impaired flexibility;Pain;Muscle strength;Range of motion  -JS     Assessment Comments Ce Lebron is a 51 y.o. year-old female referred to physical therapy for L knee pain, pre-operative rehabilitation for upcoming L TKA scheduled for 2/27/23. She presents with a evolving clinical presentation with increased L knee pain that is no longer responding to injections per patient  report.  She has comorbidities R knee pain, bilateral hip pain/bursitis and personal factors work as a RN, limited activity/exercise due to pain that may affect her progress in the plan of care.  Signs and symptoms are consistent with referring diagnosis.  Patient will benefit from skilled PHYSICAL THERAPY to address impairments and progress toward goals in preparation for upcoming surgery.  -JS     Please refer to paper survey for additional self-reported information Yes  -JS     Rehab Potential Good  -JS     Patient/caregiver participated in establishment of treatment plan and goals Yes  -JS     Patient would benefit from skilled therapy intervention Yes  -JS        PT Plan    PT Frequency Other (comment)  -JS     Predicted Duration of Therapy Intervention (PT) 1-2 visits  -JS     PT Plan Comments Pt to begin HEP, initiate use of recumbent bike at home & continue ice. Follow-up x 1 visit to review/progress HEP & provide education in gait training, stairs with assistive device in preparation for upcoming surgery.  -JS           User Key  (r) = Recorded By, (t) = Taken By, (c) = Cosigned By    Initials Name Provider Type    Minda Man, PT Physical Therapist                   OP Exercises     Row Name 11/09/22 0830             Subjective Comments    Subjective Comments Pt scheduled for L TKR on 2/27/23.  -JS         Subjective Pain    Able to rate subjective pain? yes  -JS      Subjective Pain Comment 0/10 at rest, 5-6/10 with activity L knee  -JS         Total Minutes    17461 - PT Therapeutic Exercise Minutes 10  -JS         Exercise 1    Exercise Name 1 Recumbent bike  -JS      Time 1 next visit  -JS         Exercise 2    Exercise Name 2 QS  -JS      Cueing 2 Verbal;Demo  -JS      Reps 2 10  -JS      Time 2 5 sec  -JS         Exercise 3    Exercise Name 3 SLR  -JS      Cueing 3 Verbal;Demo  -JS      Reps 3 5  -JS         Exercise 4    Exercise Name 4 Bridge  -JS      Cueing 4 Verbal;Demo  -JS      Reps 4 5   -JS      Time 4 5 sec  -JS         Exercise 5    Exercise Name 5 Sidelying hip abd  -JS      Cueing 5 Verbal;Demo  -JS      Reps 5 5  -JS         Exercise 6    Exercise Name 6 Heel slide  -JS      Cueing 6 Verbal;Demo  -JS      Time 6 review  -JS         Exercise 7    Exercise Name 7 Hamstring stretch (90/90 position with strap)  -JS      Cueing 7 Verbal;Demo  -JS      Reps 7 2  -JS      Time 7 20 sec  -JS         Exercise 8    Exercise Name 8 Quad stretch (prone with strap)  -JS      Cueing 8 Verbal;Demo  -JS      Reps 8 2  -JS      Time 8 20 sec  -JS         Exercise 9    Exercise Name 9 Gastroc stretch (standing at wall)  -JS      Cueing 9 Verbal;Demo  -JS      Reps 9 2  -JS      Time 9 20 sec  -JS            User Key  (r) = Recorded By, (t) = Taken By, (c) = Cosigned By    Initials Name Provider Type    Minda Man, PT Physical Therapist                              Outcome Measure Options: Knee Outcome Score- ADL  Knee Outcome Survey Activities of Daily Living Scale  Symptoms: Pain: The symptom affects my activity severely  Symptoms: Stiffness: The symptom affects my activity moderately  Symptoms: Swelling: The symptom affects my activity slightly  Symptoms: Giving way, buckling, or shifting of the knee: The symptom affects my activity severely  Symptoms: Weakness: The symptom affects my activity severely  Symptoms: Limping: The symptom affects my activity severely  Functional Limitations with ADL's: Walk: Activity is somewhat difficult  Functional Limitations with ADL's: Go up stairs: Activity is somewhat difficult  Functional Limitations with ADL's: Go down stairs: Activity is fairly difficult  Functional Limitations with ADL's: Stand: Activity is minimally difficult  Functional Limitations with ADL's: Kneel on front of your knee: Activity is very difficult  Functional Limitations with ADL's: Squat: Activity is fairly difficult  Functional Limitations with ADL's: Sit with your knee bent: Activity is not  difficult  Functional Limitations with ADL's: Rise from a chair: Activity is minimally difficult  Knee Outcome Summary ADL's Score: 47.14 %      Time Calculation:     Start Time: 0830  Stop Time: 0915  Time Calculation (min): 45 min  Timed Charges  07815 - PT Therapeutic Exercise Minutes: 10  Total Minutes  Timed Charges Total Minutes: 10   Total Minutes: 10     Therapy Charges for Today     Code Description Service Date Service Provider Modifiers Qty    70284358783  PT THER PROC EA 15 MIN 11/9/2022 Minda Mccullough, PT GP 1    [id number]  PT EVAL LOW COMPLEXITY 2 11/9/2022 Minda Mccullough, PT GP 1          PT G-Codes  Outcome Measure Options: Knee Outcome Score- ADL         Minda Mccullough PT  11/9/2022

## 2022-12-09 ENCOUNTER — OUTSIDE FACILITY SERVICE (OUTPATIENT)
Dept: GASTROENTEROLOGY | Facility: CLINIC | Age: 52
End: 2022-12-09

## 2022-12-09 PROCEDURE — 45378 DIAGNOSTIC COLONOSCOPY: CPT | Performed by: INTERNAL MEDICINE

## 2022-12-16 RX ORDER — MELOXICAM 15 MG/1
15 TABLET ORAL DAILY
Qty: 90 TABLET | Refills: 1 | OUTPATIENT
Start: 2022-12-16

## 2022-12-20 ENCOUNTER — TELEPHONE (OUTPATIENT)
Dept: GASTROENTEROLOGY | Facility: CLINIC | Age: 52
End: 2022-12-20

## 2022-12-20 DIAGNOSIS — M15.9 GENERALIZED OSTEOARTHRITIS: Primary | ICD-10-CM

## 2022-12-20 RX ORDER — ONDANSETRON 4 MG/1
4 TABLET, FILM COATED ORAL EVERY 8 HOURS PRN
Qty: 30 TABLET | Refills: 0 | Status: SHIPPED | OUTPATIENT
Start: 2022-12-20

## 2022-12-20 RX ORDER — MELOXICAM 7.5 MG/1
7.5 TABLET ORAL DAILY
Qty: 90 TABLET | Refills: 1 | Status: SHIPPED | OUTPATIENT
Start: 2022-12-20

## 2022-12-20 NOTE — TELEPHONE ENCOUNTER
----- Message from Bella Tracy MD sent at 12/15/2022  4:08 PM EST -----  Regarding: natasha  Please put the patient in the recall system for colonoscopy in 10 years    ----- Message -----  From: Kraig Cisneros Incoming  Sent: 12/12/2022  10:08 AM EST  To: Bella Tracy MD

## 2023-01-30 DIAGNOSIS — M48.061 SPINAL STENOSIS OF LUMBAR REGION, UNSPECIFIED WHETHER NEUROGENIC CLAUDICATION PRESENT: Primary | ICD-10-CM

## 2023-01-30 RX ORDER — LIRAGLUTIDE 6 MG/ML
INJECTION, SOLUTION SUBCUTANEOUS
Qty: 15 ML | Refills: 0 | Status: SHIPPED | OUTPATIENT
Start: 2023-01-30 | End: 2023-03-08

## 2023-01-30 RX ORDER — CYCLOBENZAPRINE HCL 10 MG
10 TABLET ORAL 3 TIMES DAILY PRN
Qty: 90 TABLET | Refills: 0 | Status: SHIPPED | OUTPATIENT
Start: 2023-01-30

## 2023-02-09 ENCOUNTER — HOSPITAL ENCOUNTER (OUTPATIENT)
Dept: GENERAL RADIOLOGY | Facility: HOSPITAL | Age: 53
Discharge: HOME | End: 2023-02-09
Payer: COMMERCIAL

## 2023-02-09 ENCOUNTER — HOSPITAL ENCOUNTER (OUTPATIENT)
Dept: PAIN MEDICINE | Facility: HOSPITAL | Age: 53
Discharge: HOME | End: 2023-02-09
Payer: COMMERCIAL

## 2023-02-09 ENCOUNTER — ANESTHESIA (OUTPATIENT)
Dept: PAIN MEDICINE | Facility: HOSPITAL | Age: 53
End: 2023-02-09
Payer: COMMERCIAL

## 2023-02-09 ENCOUNTER — ANESTHESIA EVENT (OUTPATIENT)
Dept: PAIN MEDICINE | Facility: HOSPITAL | Age: 53
End: 2023-02-09
Payer: COMMERCIAL

## 2023-02-09 VITALS
SYSTOLIC BLOOD PRESSURE: 118 MMHG | BODY MASS INDEX: 43.94 KG/M2 | HEART RATE: 92 BPM | OXYGEN SATURATION: 93 % | HEIGHT: 63 IN | WEIGHT: 248 LBS | TEMPERATURE: 97.5 F | RESPIRATION RATE: 16 BRPM | DIASTOLIC BLOOD PRESSURE: 91 MMHG

## 2023-02-09 DIAGNOSIS — M54.16 LUMBAR RADICULOPATHY: Primary | ICD-10-CM

## 2023-02-09 DIAGNOSIS — R52 PAIN: ICD-10-CM

## 2023-02-09 PROCEDURE — 77003 FLUOROGUIDE FOR SPINE INJECT: CPT

## 2023-02-09 PROCEDURE — 25010000002 METHYLPREDNISOLONE PER 80 MG: Performed by: ANESTHESIOLOGY

## 2023-02-09 RX ORDER — LIDOCAINE HYDROCHLORIDE 10 MG/ML
1 INJECTION, SOLUTION INFILTRATION; PERINEURAL ONCE AS NEEDED
Status: DISCONTINUED | OUTPATIENT
Start: 2023-02-09 | End: 2023-02-10 | Stop reason: HOSPADM

## 2023-02-09 RX ORDER — FENTANYL CITRATE 50 UG/ML
50 INJECTION, SOLUTION INTRAMUSCULAR; INTRAVENOUS AS NEEDED
Status: DISCONTINUED | OUTPATIENT
Start: 2023-02-09 | End: 2023-02-10 | Stop reason: HOSPADM

## 2023-02-09 RX ORDER — SODIUM CHLORIDE 0.9 % (FLUSH) 0.9 %
1-10 SYRINGE (ML) INJECTION AS NEEDED
Status: DISCONTINUED | OUTPATIENT
Start: 2023-02-09 | End: 2023-02-10 | Stop reason: HOSPADM

## 2023-02-09 RX ORDER — METHYLPREDNISOLONE ACETATE 80 MG/ML
80 INJECTION, SUSPENSION INTRA-ARTICULAR; INTRALESIONAL; INTRAMUSCULAR; SOFT TISSUE ONCE
Status: COMPLETED | OUTPATIENT
Start: 2023-02-09 | End: 2023-02-09

## 2023-02-09 RX ORDER — MIDAZOLAM HYDROCHLORIDE 1 MG/ML
1 INJECTION INTRAMUSCULAR; INTRAVENOUS AS NEEDED
Status: DISCONTINUED | OUTPATIENT
Start: 2023-02-09 | End: 2023-02-10 | Stop reason: HOSPADM

## 2023-02-09 RX ADMIN — METHYLPREDNISOLONE ACETATE 80 MG: 80 INJECTION, SUSPENSION INTRA-ARTICULAR; INTRALESIONAL; INTRAMUSCULAR; SOFT TISSUE at 13:50

## 2023-02-09 NOTE — ANESTHESIA PROCEDURE NOTES
PAIN Epidural block    Pre-sedation assessment completed: 2/9/2023 1:44 PM    Patient reassessed immediately prior to procedure    Patient location during procedure: pain clinic  Start Time: 2/9/2023 1:44 PM  Stop Time: 2/9/2023 1:54 PM  Indication:procedure for pain  Performed By  Anesthesiologist: Dolly Whipple MD  Preanesthetic Checklist  Completed: patient identified, IV checked, site marked, risks and benefits discussed, surgical consent, monitors and equipment checked, pre-op evaluation and timeout performed  Additional Notes  Fluoro used.    Dx: lumbar radiculopathy.    Prep:  Pt Position:prone  Sterile Tech:cap, gloves, mask and sterile barrier  Prep:chlorhexidine gluconate and isopropyl alcohol  Monitoring:blood pressure monitoring, continuous pulse oximetry and EKG  Procedure:Sedation: no     Approach:left paramedian  Guidance: fluoroscopy  Location:lumbar  Level:L5-S1  Needle Type:Tuohy  Needle Gauge:20  Aspiration:negative  Medications:  Preservative Free Saline:3mL  Isovue:0mL  Comments:No dye indicatedDepomedrol:80  Post Assessment:  Dressing:occlusive dressing applied  Pt Tolerance:patient tolerated the procedure well with no apparent complications  Complications:no

## 2023-02-09 NOTE — H&P
[facility]    History and Physical    Patient Name: Ce Lebron  :  1970  MRN:  [id number]  Date of Admission: 2023    Subjective     Patient is a 52 y.o. female presents with chief complaint of chronic, moderate low back pain.  Onset of symptoms was gradual starting several years ago.  But has gotten significantly worse in past 6 weeks.  She feels worsening b/c needs TKA but has postponed it to help care for her mother who fell & broke her back.  Pain is mostly in the right hip & down the right leg but does go into left leg on occasion.  Symptoms are associated/aggravated by nothing in particular or activity. Symptoms improve with nothing.  Presents for lesi 1.      The following portions of the patients history were reviewed and updated as appropriate: current medications, allergies, past medical history, past surgical history, past family history, past social history and problem list                Objective     Past Medical History:   Past Medical History:   Diagnosis Date   • Allergic    • Anemia    • Anxiety    • Arthritis    • Back problem    • Depression    • GERD (gastroesophageal reflux disease)    • Knee problem    • Low back pain    • Methicillin resistant Staph aureus culture positive    • Obesity    • Prediabetes    • Tendinitis of right elbow      Past Surgical History:   Past Surgical History:   Procedure Laterality Date   • ADENOIDECTOMY     • APPENDECTOMY  2012   •  SECTION     • DIAGNOSTIC LAPAROSCOPY, SALPINGO OOPHORECTOMY LAPAROSCOPIC     • ENDOMETRIAL ABLATION  10/2012   • EPIDURAL BLOCK     • HYSTERECTOMY     • LAPAROSCOPIC CHOLECYSTECTOMY  2005   • LASIK     • OOPHORECTOMY     • TONSILLECTOMY     • VENTRAL HERNIA REPAIR  2016     Family History:   Family History   Problem Relation Age of Onset   • Breast cancer Mother    • Stroke Mother    • Hypertension Mother    • Hyperlipidemia Mother    • Cancer Mother    • Diabetes Mother    •  "Mental illness Mother    • Depression Mother    • Hypertension Father    • Hyperlipidemia Father    • Drug abuse Sister    • Depression Sister    • Mental illness Sister    • ADD / ADHD Son    • Asthma Son    • Heart disease Maternal Grandmother    • Colon cancer Maternal Grandfather    • Throat cancer Paternal Grandfather      Social History:   Social History     Socioeconomic History   • Marital status: Single   Tobacco Use   • Smoking status: Former     Packs/day: 1.00     Years: 8.00     Pack years: 8.00     Types: Cigarettes     Quit date:      Years since quittin.1   • Smokeless tobacco: Never   Vaping Use   • Vaping Use: Never used   Substance and Sexual Activity   • Alcohol use: Yes     Comment: rarely   • Drug use: Never   • Sexual activity: Not Currently       Vital Signs Range for the last 24 hours  Temperature: Temp:  [36.4 °C (97.5 °F)] 36.4 °C (97.5 °F)   Temp Source: Temp src: Infrared   BP: BP: (134)/(81) 134/81   Pulse: Heart Rate:  [100] 100   Respirations: Resp:  [16] 16   SPO2: SpO2:  [96 %] 96 %   O2 Amount (l/min):     O2 Devices Device (Oxygen Therapy): room air   Weight: Weight:  [112 kg (248 lb)] 112 kg (248 lb)     Flowsheet Rows    Flowsheet Row First Filed Value   Admission Height 160 cm (63\") Documented at 2023 1337   Admission Weight 112 kg (248 lb) Documented at 2023 1337          --------------------------------------------------------------------------------    Current Outpatient Medications   Medication Sig Dispense Refill   • albuterol sulfate HFA (ProAir HFA) 108 (90 Base) MCG/ACT inhaler Inhale 1 puff Every 4 (Four) Hours As Needed for Wheezing or Shortness of Air. 8.5 g 3   • cetirizine (zyrTEC) 10 MG tablet Take 10 mg by mouth Daily.     • Cyanocobalamin (VITAMIN B 12 PO) Take 1,000 mcg by mouth.     • cyclobenzaprine (FLEXERIL) 10 MG tablet Take 1 tablet by mouth 3 (Three) Times a Day As Needed for Muscle Spasms. 90 tablet 0   • FLUoxetine (PROzac) 40 MG " capsule Take 1 capsule by mouth Daily. 90 capsule 1   • Liraglutide (Saxenda) 18 MG/3ML injection pen Inject 0.6mg under skin daily for week one, THEN 1.2mg daily for week two, THEN 1.8mg daily for week three, then 2.4mg daily for week four. 15 mL 0   • meloxicam (MOBIC) 7.5 MG tablet Take 1 tablet by mouth Daily. 90 tablet 1   • pseudoephedrine (SUDAFED) 30 MG tablet Take 60 mg by mouth.     • spironolactone-hydrochlorothiazide (Aldactazide) 25-25 MG tablet Take 1 tablet by mouth Daily. 90 tablet 1   • fluticasone (FLONASE) 50 MCG/ACT nasal spray 2 sprays into the nostril(s) as directed by provider Daily.     • Insulin Pen Needle 32G X 4 MM misc Use daily with saxenda 100 each 1   • ondansetron (Zofran) 4 MG tablet Take 1 tablet by mouth Every 8 (Eight) Hours As Needed for Nausea or Vomiting. 30 tablet 0     Current Facility-Administered Medications   Medication Dose Route Frequency Provider Last Rate Last Admin   • fentaNYL citrate (PF) (SUBLIMAZE) injection 50 mcg  50 mcg Intravenous PRN Dolly Whipple MD       • iopamidol (ISOVUE-M 200) injection 41%  12 mL Epidural Once in imaging Dolly Whipple MD       • lidocaine (XYLOCAINE) 1 % injection 1 mL  1 mL Intradermal Once PRN Dolly Whipple MD       • methylPREDNISolone acetate (DEPO-medrol) injection 80 mg  80 mg Intra-articular Once Dolly Whipple MD       • midazolam (VERSED) injection 1 mg  1 mg Intravenous PRN Dolly Whipple MD       • sodium chloride 0.9 % flush 1-10 mL  1-10 mL Intravenous PRN Dolly Whipple MD           --------------------------------------------------------------------------------  Assessment & Plan      Anesthesia Evaluation     Patient summary reviewed and Nursing notes reviewed   no history of anesthetic complications:               Airway   Mallampati: II  Dental      Pulmonary    (+) a smoker Former,   Cardiovascular - negative cardio ROS  Exercise  tolerance: good (4-7 METS)        Neuro/Psych  (+) psychiatric history Depression and Anxiety,    GI/Hepatic/Renal/Endo    (+) obesity,  GERD,      Musculoskeletal     (+) back pain, chronic pain, radiculopathy  Abdominal    Substance History - negative use     OB/GYN negative ob/gyn ROS         Other   arthritis,                 Diagnosis and Plan    Treatment Plan  ASA 2      Procedures: Lumbar Epidural Steroid Injection(LESI), With fluoroscopy,       Anesthetic plan and risks discussed with patient.          Diagnosis     * Protrusion of lumbar intervertebral disc [M51.26]     * Lumbar radiculopathy [M54.16]

## 2023-02-09 NOTE — DISCHARGE INSTRUCTIONS

## 2023-03-08 ENCOUNTER — OFFICE VISIT (OUTPATIENT)
Dept: INTERNAL MEDICINE | Facility: CLINIC | Age: 53
End: 2023-03-08
Payer: COMMERCIAL

## 2023-03-08 VITALS
HEIGHT: 63 IN | DIASTOLIC BLOOD PRESSURE: 74 MMHG | BODY MASS INDEX: 43.59 KG/M2 | SYSTOLIC BLOOD PRESSURE: 132 MMHG | WEIGHT: 246 LBS | HEART RATE: 80 BPM

## 2023-03-08 DIAGNOSIS — R73.03 PREDIABETES: Primary | ICD-10-CM

## 2023-03-08 DIAGNOSIS — Z00.00 ANNUAL PHYSICAL EXAM: ICD-10-CM

## 2023-03-08 DIAGNOSIS — Z00.00 PHYSICAL EXAM, ANNUAL: ICD-10-CM

## 2023-03-08 DIAGNOSIS — E66.01 OBESITY, CLASS III, BMI 40-49.9 (MORBID OBESITY): ICD-10-CM

## 2023-03-08 PROCEDURE — 99396 PREV VISIT EST AGE 40-64: CPT | Performed by: INTERNAL MEDICINE

## 2023-03-08 NOTE — PROGRESS NOTES
Kiko Lebron is a 52 y.o. female and is here for a comprehensive physical exam. The patient reports no problems.    Had hysterectomy for adhesions and fibroids-  No ovaries. No estrogen- mother with breast cancer in 40s.    Started Saxenda is early January- has lost 12 lbs so far. Feels the most affect at 3 mg dosing.  A little heartburn but otherwise no issues.     To have L TKA in May.     Social History     Socioeconomic History   • Marital status: Single   Tobacco Use   • Smoking status: Former     Packs/day: 1.00     Years: 8.00     Pack years: 8.00     Types: Cigarettes     Quit date:      Years since quittin.1   • Smokeless tobacco: Never   Vaping Use   • Vaping Use: Never used   Substance and Sexual Activity   • Alcohol use: Yes     Comment: rarely   • Drug use: Never   • Sexual activity: Not Currently       Family History   Problem Relation Age of Onset   • Breast cancer Mother    • Stroke Mother    • Hypertension Mother    • Hyperlipidemia Mother    • Cancer Mother    • Diabetes Mother    • Mental illness Mother    • Depression Mother    • Hypertension Father    • Hyperlipidemia Father    • Drug abuse Sister    • Depression Sister    • Mental illness Sister    • ADD / ADHD Son    • Asthma Son    • Heart disease Maternal Grandmother    • Colon cancer Maternal Grandfather    • Throat cancer Paternal Grandfather           Past Medical History:   Diagnosis Date   • Allergic    • Anemia    • Anxiety    • Arthritis    • Back problem    • Depression    • GERD (gastroesophageal reflux disease)    • Knee problem    • Low back pain    • Methicillin resistant Staph aureus culture positive    • Obesity    • Prediabetes    • Tendinitis of right elbow           Current Outpatient Medications:   •  albuterol sulfate HFA (ProAir HFA) 108 (90 Base) MCG/ACT inhaler, Inhale 1 puff Every 4 (Four) Hours As Needed for Wheezing or Shortness of Air., Disp: 8.5 g, Rfl: 3  •  cetirizine (zyrTEC) 10 MG tablet,  "Take 1 tablet by mouth Daily., Disp: , Rfl:   •  Cyanocobalamin (VITAMIN B 12 PO), Take 1,000 mcg by mouth., Disp: , Rfl:   •  cyclobenzaprine (FLEXERIL) 10 MG tablet, Take 1 tablet by mouth 3 (Three) Times a Day As Needed for Muscle Spasms., Disp: 90 tablet, Rfl: 0  •  FLUoxetine (PROzac) 40 MG capsule, Take 1 capsule by mouth Daily., Disp: 90 capsule, Rfl: 1  •  fluticasone (FLONASE) 50 MCG/ACT nasal spray, 2 sprays into the nostril(s) as directed by provider Daily., Disp: , Rfl:   •  Insulin Pen Needle 32G X 4 MM misc, Use daily with saxenda, Disp: 100 each, Rfl: 1  •  meloxicam (MOBIC) 7.5 MG tablet, Take 1 tablet by mouth Daily., Disp: 90 tablet, Rfl: 1  •  ondansetron (Zofran) 4 MG tablet, Take 1 tablet by mouth Every 8 (Eight) Hours As Needed for Nausea or Vomiting., Disp: 30 tablet, Rfl: 0  •  pseudoephedrine (SUDAFED) 30 MG tablet, Take 2 tablets by mouth., Disp: , Rfl:   •  spironolactone-hydrochlorothiazide (Aldactazide) 25-25 MG tablet, Take 1 tablet by mouth Daily., Disp: 90 tablet, Rfl: 1  •  Liraglutide (SAXENDA) 18 MG/3ML injection pen, Inject 3 mg under the skin into the appropriate area as directed Daily., Disp: 15 mL, Rfl: 5    Review of Systems    Review of Systems   Constitutional: Negative for fatigue and unexpected weight loss.   Eyes: Negative for visual disturbance.   Respiratory: Negative for shortness of breath.    Cardiovascular: Negative for chest pain and palpitations.   Gastrointestinal: Negative for abdominal pain and blood in stool.   Endocrine: Negative for cold intolerance.   Genitourinary: Negative for breast lump.   Musculoskeletal: Negative for arthralgias and gait problem.   Neurological: Negative for memory problem.   Psychiatric/Behavioral: Negative for depressed mood.        There were no vitals filed for this visit.    Vitals:    03/08/23 0730   BP: 132/74   Pulse: 80   Weight: 112 kg (246 lb)   Height: 160 cm (63\")     Body mass index is 43.58 kg/m².  Wt Readings from Last " 3 Encounters:   03/08/23 112 kg (246 lb)   02/09/23 112 kg (248 lb)   10/18/22 112 kg (247 lb 3.2 oz)      Objective     Physical Exam  Constitutional:       General: She is not in acute distress.  Eyes:      Conjunctiva/sclera: Conjunctivae normal.   Neck:      Thyroid: No thyromegaly.   Cardiovascular:      Rate and Rhythm: Normal rate and regular rhythm.      Heart sounds: Normal heart sounds.   Pulmonary:      Effort: Pulmonary effort is normal.      Breath sounds: Normal breath sounds.   Abdominal:      General: Bowel sounds are normal.      Palpations: Abdomen is soft.   Musculoskeletal:      Right lower leg: No edema.      Left lower leg: No edema.   Lymphadenopathy:      Cervical: No cervical adenopathy.   Skin:     General: Skin is warm and dry.   Neurological:      Mental Status: She is alert and oriented to person, place, and time.   Psychiatric:         Behavior: Behavior normal.         Thought Content: Thought content normal.         Judgment: Judgment normal.         Procedures       Assessment & Plan         Diagnoses and all orders for this visit:    1. Prediabetes (Primary)  Comments:  check A1C, suspect it will be better on GLP-1 and weight loss!  Orders:  -     Hemoglobin A1c    2. Annual physical exam  -     Comprehensive Metabolic Panel  -     Lipid Panel With / Chol / HDL Ratio    3. Obesity, Class III, BMI 40-49.9 (morbid obesity) (HCC)  Comments:  on Saxenda wialex good results- continue same at 3mg dose- recheck in 3 months.     4. Physical exam, annual  Comments:  exam benign except weight issues- she is considering seeing gyn for BRCA gene testing.  Check labs.  encouraged 150 minutes of exercise weekly.      Other orders  -     Liraglutide (SAXENDA) 18 MG/3ML injection pen; Inject 3 mg under the skin into the appropriate area as directed Daily.  Dispense: 15 mL; Refill: 5        Return in about 6 months (around 9/8/2023) for Recheck, Lab Before FUP.

## 2023-03-13 ENCOUNTER — TELEPHONE (OUTPATIENT)
Dept: INTERNAL MEDICINE | Facility: CLINIC | Age: 53
End: 2023-03-13

## 2023-03-13 ENCOUNTER — TELEMEDICINE (OUTPATIENT)
Dept: INTERNAL MEDICINE | Facility: CLINIC | Age: 53
End: 2023-03-13
Payer: COMMERCIAL

## 2023-03-13 DIAGNOSIS — U07.1 COVID-19: Primary | ICD-10-CM

## 2023-03-13 PROCEDURE — 99213 OFFICE O/P EST LOW 20 MIN: CPT | Performed by: PHYSICIAN ASSISTANT

## 2023-03-13 RX ORDER — HYDROCODONE BITARTRATE AND HOMATROPINE METHYLBROMIDE ORAL SOLUTION 5; 1.5 MG/5ML; MG/5ML
5 LIQUID ORAL EVERY 6 HOURS PRN
Qty: 120 ML | Refills: 0 | Status: SHIPPED | OUTPATIENT
Start: 2023-03-13

## 2023-03-13 RX ORDER — BENZONATATE 100 MG/1
100 CAPSULE ORAL 3 TIMES DAILY PRN
Qty: 30 CAPSULE | Refills: 0 | Status: SHIPPED | OUTPATIENT
Start: 2023-03-13

## 2023-03-13 NOTE — TELEPHONE ENCOUNTER
Caller: Ce Lebron    Relationship: Self    Best call back number: [phone]    What is the best time to reach you: ANYTIME    Who are you requesting to speak with (clinical staff, provider,  specific staff member): MA        What was the call regarding: PATIENT DID NOT GET MESSAGE ABOUT VIDEO VISIT UNTIL A BIT AGO SHE WAS SLEEPING SAYS SHE CAN BE AVAILABLE AT ANOTHER TIME TODAY.    Do you require a callback: YES

## 2023-03-13 NOTE — PROGRESS NOTES
Subjective   Chief Complaint   Patient presents with   • Covid-19 Home Monitoring Video Visit     Mode of Visit: Video  Location of patient: home  Location of provider: [facility] [place] [facility]  You have chosen to receive care through a telehealth visit.  Does the patient consent to use a video/audio connection for your medical care today? Yes  The visit included audio and video interaction. No technical issues occurred during this visit.       History of Present Illness        Pt tested positive for COVID 3 days ago. cough started 4 days ago. Has had a cough, mostly dry. Not able to cough up anything. Using dextromo and guaifenesin.   Patient Active Problem List   Diagnosis   • Low back pain   • Depression   • Gastroesophageal reflux disease   • History of tobacco use   • Prediabetes   • Vitamin D deficiency   • Anxiety   • Recurrent major depressive disorder, in full remission (HCC)   • Primary osteoarthritis of left knee       Allergies   Allergen Reactions   • Avocado Anaphylaxis   • Sulfa Antibiotics Itching   • Adhesive Tape Swelling     Gets some redness and swelling   • Latex Dermatitis, Itching, Rash and Swelling       Current Outpatient Medications on File Prior to Visit   Medication Sig Dispense Refill   • albuterol sulfate HFA (ProAir HFA) 108 (90 Base) MCG/ACT inhaler Inhale 1 puff Every 4 (Four) Hours As Needed for Wheezing or Shortness of Air. 8.5 g 3   • cetirizine (zyrTEC) 10 MG tablet Take 1 tablet by mouth Daily.     • Cyanocobalamin (VITAMIN B 12 PO) Take 1,000 mcg by mouth.     • cyclobenzaprine (FLEXERIL) 10 MG tablet Take 1 tablet by mouth 3 (Three) Times a Day As Needed for Muscle Spasms. 90 tablet 0   • FLUoxetine (PROzac) 40 MG capsule Take 1 capsule by mouth Daily. 90 capsule 1   • fluticasone (FLONASE) 50 MCG/ACT nasal spray 2 sprays into the nostril(s) as directed by provider Daily.     • Insulin Pen Needle 32G X 4 MM misc Use daily with saxenda 100 each 1   • Liraglutide (SAXENDA) 18 MG/3ML  injection pen Inject 3 mg under the skin into the appropriate area as directed Daily. 15 mL 5   • meloxicam (MOBIC) 7.5 MG tablet Take 1 tablet by mouth Daily. 90 tablet 1   • ondansetron (Zofran) 4 MG tablet Take 1 tablet by mouth Every 8 (Eight) Hours As Needed for Nausea or Vomiting. 30 tablet 0   • pseudoephedrine (SUDAFED) 30 MG tablet Take 2 tablets by mouth.     • spironolactone-hydrochlorothiazide (Aldactazide) 25-25 MG tablet Take 1 tablet by mouth Daily. 90 tablet 1     No current facility-administered medications on file prior to visit.       Past Medical History:   Diagnosis Date   • Allergic    • Anemia    • Anxiety    • Arthritis    • Back problem    • Depression    • GERD (gastroesophageal reflux disease)    • Knee problem    • Low back pain    • Methicillin resistant Staph aureus culture positive    • Obesity    • Prediabetes    • Tendinitis of right elbow        Family History   Problem Relation Age of Onset   • Breast cancer Mother    • Stroke Mother    • Hypertension Mother    • Hyperlipidemia Mother    • Cancer Mother    • Diabetes Mother    • Mental illness Mother    • Depression Mother    • Hypertension Father    • Hyperlipidemia Father    • Drug abuse Sister    • Depression Sister    • Mental illness Sister    • ADD / ADHD Son    • Asthma Son    • Heart disease Maternal Grandmother    • Colon cancer Maternal Grandfather    • Throat cancer Paternal Grandfather        Social History     Socioeconomic History   • Marital status: Single   Tobacco Use   • Smoking status: Former     Packs/day: 1.00     Years: 8.00     Pack years: 8.00     Types: Cigarettes     Quit date:      Years since quittin.2   • Smokeless tobacco: Never   Vaping Use   • Vaping Use: Never used   Substance and Sexual Activity   • Alcohol use: Yes     Comment: rarely   • Drug use: Never   • Sexual activity: Not Currently       Past Surgical History:   Procedure Laterality Date   • ADENOIDECTOMY     • APPENDECTOMY   2012   •  SECTION     • DIAGNOSTIC LAPAROSCOPY, SALPINGO OOPHORECTOMY LAPAROSCOPIC     • ENDOMETRIAL ABLATION  10/2012   • EPIDURAL BLOCK     • HYSTERECTOMY     • LAPAROSCOPIC CHOLECYSTECTOMY  2005   • LASIK     • OOPHORECTOMY     • TONSILLECTOMY     • VENTRAL HERNIA REPAIR  2016         The following portions of the patient's history were reviewed and updated as appropriate: problem list, allergies, current medications, past medical history, past family history, past social history and past surgical history.    ROS     See HPI    Immunization History   Administered Date(s) Administered   • COVID-19 (PFIZER) PURPLE CAP 2021, 2021, 10/14/2021   • Flu Vaccine Intradermal Quad 18-64YR 10/05/2022   • Flu Vaccine Quad PF >36MO 2018, 2020   • Fluzone Quad >6mos (Multi-dose) 2017   • Hep A, 2 Dose 2018   • Hep B, Unspecified 2002, 2002, 2002   • Hepatitis B 10/15/1971, 1973, 1991   • Influenza, Unspecified 10/10/2020, 10/16/2020   • Tdap 2009, 2013       Objective   There were no vitals filed for this visit.  There is no height or weight on file to calculate BMI.  Physical Exam  Constitutional:       Appearance: Normal appearance.   HENT:      Head: Normocephalic and atraumatic.   Neurological:      Mental Status: She is alert.           Assessment & Plan   Diagnoses and all orders for this visit:    1. COVID-19 (Primary)  -     benzonatate (Tessalon Perles) 100 MG capsule; Take 1 capsule by mouth 3 (Three) Times a Day As Needed for Cough.  Dispense: 30 capsule; Refill: 0        No follow-ups on file.

## 2023-03-29 RX ORDER — SPIRONOLACTONE AND HYDROCHLOROTHIAZIDE 25; 25 MG/1; MG/1
1 TABLET ORAL DAILY
Qty: 90 TABLET | Refills: 1 | Status: CANCELLED | OUTPATIENT
Start: 2023-03-29

## 2023-03-29 RX ORDER — SPIRONOLACTONE AND HYDROCHLOROTHIAZIDE 25; 25 MG/1; MG/1
1 TABLET ORAL DAILY
Qty: 90 TABLET | Refills: 1 | Status: SHIPPED | OUTPATIENT
Start: 2023-03-29

## 2023-03-30 ENCOUNTER — TELEPHONE (OUTPATIENT)
Dept: INTERNAL MEDICINE | Facility: CLINIC | Age: 53
End: 2023-03-30

## 2023-03-30 DIAGNOSIS — M17.12 PRIMARY OSTEOARTHRITIS OF LEFT KNEE: Primary | ICD-10-CM

## 2023-03-30 NOTE — TELEPHONE ENCOUNTER
Called spoke with tech they are sending for further review will let us know the answer late today or tomorrow

## 2023-03-30 NOTE — TELEPHONE ENCOUNTER
Caller: OTHER    Relationship: Other    Best call back number: [phone]  What is the best time to reach you:ANYTIME CASE# [id number]  Who are you requesting to speak with (clinical staff, provider,  specific staff member): CLINICAL STAFF  Do you know the name of the person who called:BREANNE What was the call regarding: DESRIEE WITH [employer] HAS A QUESTION REGARDING THE MEDICATION SAXENDA. HAS THE PATIENT ACHIEVED AND MAINTAIN A WEIGHT LOSS OF AT LEAST 5% FROM BASELINE   Do you require a callback:YES

## 2023-04-06 ENCOUNTER — TELEPHONE (OUTPATIENT)
Dept: PHYSICAL THERAPY | Facility: CLINIC | Age: 53
End: 2023-04-06

## 2023-04-15 ENCOUNTER — PREP FOR SURGERY (OUTPATIENT)
Dept: OTHER | Facility: HOSPITAL | Age: 53
End: 2023-04-15
Payer: COMMERCIAL

## 2023-04-15 VITALS — BODY MASS INDEX: 43.59 KG/M2 | WEIGHT: 246 LBS | HEIGHT: 63 IN

## 2023-04-15 DIAGNOSIS — M17.12 PRIMARY OSTEOARTHRITIS OF LEFT KNEE: Primary | ICD-10-CM

## 2023-04-15 PROBLEM — M17.9 OA (OSTEOARTHRITIS) OF KNEE: Status: ACTIVE | Noted: 2023-04-15

## 2023-04-15 RX ORDER — PREGABALIN 75 MG/1
150 CAPSULE ORAL ONCE
OUTPATIENT
Start: 2023-05-03 | End: 2023-04-15

## 2023-04-15 RX ORDER — CEFAZOLIN SODIUM 2 G/100ML
2 INJECTION, SOLUTION INTRAVENOUS ONCE
OUTPATIENT
Start: 2023-05-03 | End: 2023-04-15

## 2023-04-15 RX ORDER — MELOXICAM 15 MG/1
15 TABLET ORAL ONCE
OUTPATIENT
Start: 2023-05-03 | End: 2023-04-15

## 2023-04-15 RX ORDER — CHLORHEXIDINE GLUCONATE 500 MG/1
CLOTH TOPICAL 2 TIMES DAILY
OUTPATIENT
Start: 2023-04-15

## 2023-04-20 ENCOUNTER — PRE-ADMISSION TESTING (OUTPATIENT)
Dept: PREADMISSION TESTING | Facility: HOSPITAL | Age: 53
End: 2023-04-20
Payer: COMMERCIAL

## 2023-04-20 VITALS
RESPIRATION RATE: 16 BRPM | TEMPERATURE: 98 F | BODY MASS INDEX: 41.55 KG/M2 | OXYGEN SATURATION: 97 % | SYSTOLIC BLOOD PRESSURE: 122 MMHG | HEIGHT: 64 IN | HEART RATE: 84 BPM | WEIGHT: 243.4 LBS | DIASTOLIC BLOOD PRESSURE: 83 MMHG

## 2023-04-20 DIAGNOSIS — M17.12 PRIMARY OSTEOARTHRITIS OF LEFT KNEE: ICD-10-CM

## 2023-04-20 LAB
ANION GAP SERPL CALCULATED.3IONS-SCNC: 10 MMOL/L (ref 5–15)
BUN SERPL-MCNC: 23 MG/DL (ref 6–20)
BUN/CREAT SERPL: 29.1 (ref 7–25)
CALCIUM SPEC-SCNC: 8.9 MG/DL (ref 8.6–10.5)
CHLORIDE SERPL-SCNC: 104 MMOL/L (ref 98–107)
CO2 SERPL-SCNC: 26 MMOL/L (ref 22–29)
CREAT SERPL-MCNC: 0.79 MG/DL (ref 0.57–1)
DEPRECATED RDW RBC AUTO: 39.1 FL (ref 37–54)
EGFRCR SERPLBLD CKD-EPI 2021: 89 ML/MIN/1.73
ERYTHROCYTE [DISTWIDTH] IN BLOOD BY AUTOMATED COUNT: 13.2 % (ref 12.3–15.4)
GLUCOSE SERPL-MCNC: 112 MG/DL (ref 65–99)
HBA1C MFR BLD: 6.1 % (ref 4.8–5.6)
HCT VFR BLD AUTO: 38.4 % (ref 34–46.6)
HGB BLD-MCNC: 12.7 G/DL (ref 12–15.9)
MCH RBC QN AUTO: 27.3 PG (ref 26.6–33)
MCHC RBC AUTO-ENTMCNC: 33.1 G/DL (ref 31.5–35.7)
MCV RBC AUTO: 82.6 FL (ref 79–97)
PLATELET # BLD AUTO: 385 10*3/MM3 (ref 140–450)
PMV BLD AUTO: 8.6 FL (ref 6–12)
POTASSIUM SERPL-SCNC: 3.4 MMOL/L (ref 3.5–5.2)
QT INTERVAL: 393 MS
RBC # BLD AUTO: 4.65 10*6/MM3 (ref 3.77–5.28)
SODIUM SERPL-SCNC: 140 MMOL/L (ref 136–145)
WBC NRBC COR # BLD: 5.35 10*3/MM3 (ref 3.4–10.8)

## 2023-04-20 PROCEDURE — 83036 HEMOGLOBIN GLYCOSYLATED A1C: CPT

## 2023-04-20 PROCEDURE — 80048 BASIC METABOLIC PNL TOTAL CA: CPT

## 2023-04-20 PROCEDURE — 36415 COLL VENOUS BLD VENIPUNCTURE: CPT

## 2023-04-20 PROCEDURE — 93010 ELECTROCARDIOGRAM REPORT: CPT | Performed by: INTERNAL MEDICINE

## 2023-04-20 PROCEDURE — 85027 COMPLETE CBC AUTOMATED: CPT

## 2023-04-20 PROCEDURE — 93005 ELECTROCARDIOGRAM TRACING: CPT

## 2023-04-20 RX ORDER — CHLORHEXIDINE GLUCONATE 500 MG/1
1 CLOTH TOPICAL TAKE AS DIRECTED
COMMUNITY

## 2023-04-20 NOTE — DISCHARGE INSTRUCTIONS
Take the following medications the morning of surgery:  [facility] WILL GIVE YOUR ARRIVAL TIME FOR SURGERY.    If you are on prescription narcotic pain medication to control your pain you may also take that medication the morning of surgery.    General Instructions:  Do not eat solid food after midnight the night before surgery.  You may drink clear liquids day of surgery but must stop at least one hour before your hospital arrival time.  It is beneficial for you to have a clear drink that contains carbohydrates the day of surgery.  We suggest a 12 to 20 ounce bottle of Gatorade or Powerade for non-diabetic patients or a 12 to 20 ounce bottle of G2 or Powerade Zero for diabetic patients. (Pediatric patients, are not advised to drink a 12 to 20 ounce carbohydrate drink)    Clear liquids are liquids you can see through.  Nothing red in color.     Plain water                               Sports drinks  Sodas                                   Gelatin (Jell-O)  Fruit juices without pulp such as white grape juice and apple juice  Popsicles that contain no fruit or yogurt  Tea or coffee (no cream or milk added)  Gatorade / Powerade  G2 / Powerade Zero    Infants may have breast milk up to four hours before surgery.  Infants drinking formula may drink formula up to six hours before surgery.   Patients who avoid smoking, chewing tobacco and alcohol for 4 weeks prior to surgery have a reduced risk of post-operative complications.  Quit smoking as many days before surgery as you can.  Do not smoke, use chewing tobacco or drink alcohol the day of surgery.   If applicable bring your C-PAP/ BI-PAP machine in with you to preop day of surgery.  Bring any papers given to you in the doctor’s office.  Wear clean comfortable clothes.  Do not wear contact lenses, false eyelashes or make-up.  Bring a case for your glasses.   Bring crutches or walker if applicable.  Remove all piercings.  Leave jewelry and any other  valuables at home.  Hair extensions with metal clips must be removed prior to surgery.  The Pre-Admission Testing nurse will instruct you to bring medications if unable to obtain an accurate list in Pre-Admission Testing.        If you were given a blood bank ID arm band remember to bring it with you the day of surgery.    Preventing a Surgical Site Infection:  For 2 to 3 days before surgery, avoid shaving with a razor because the razor can irritate skin and make it easier to develop an infection.    Any areas of open skin can increase the risk of a post-operative wound infection by allowing bacteria to enter and travel throughout the body.  Notify your surgeon if you have any skin wounds / rashes even if it is not near the expected surgical site.  The area will need assessed to determine if surgery should be delayed until it is healed.  The night prior to surgery shower using a fresh bar of anti-bacterial soap (such as Dial) and clean washcloth.  Sleep in a clean bed with clean clothing.  Do not allow pets to sleep with you.  Shower on the morning of surgery using a fresh bar of anti-bacterial soap (such as Dial) and clean washcloth.  Dry with a clean towel and dress in clean clothing.  Ask your surgeon if you will be receiving antibiotics prior to surgery.  Make sure you, your family, and all healthcare providers clean their hands with soap and water or an alcohol based hand  before caring for you or your wound.    Day of surgery:  Your arrival time is approximately two hours before your scheduled surgery time.  Upon arrival, a Pre-op nurse and Anesthesiologist will review your health history, obtain vital signs, and answer questions you may have.  The only belongings needed at this time will be a list of your home medications and if applicable your C-PAP/BI-PAP machine.  A Pre-op nurse will start an IV and you may receive medication in preparation for surgery, including something to help you relax.      Please be aware that surgery does come with discomfort.  We want to make every effort to control your discomfort so please discuss any uncontrolled symptoms with your nurse.   Your doctor will most likely have prescribed pain medications.      If you are going home after surgery you will receive individualized written care instructions before being discharged.  A responsible adult must drive you to and from the hospital on the day of your surgery and stay with you for 24 hours.  Discharge prescriptions can be filled by the hospital pharmacy during regular pharmacy hours.  If you are having surgery late in the day/evening your prescription may be e-prescribed to your pharmacy.  Please verify your pharmacy hours or chose a 24 hour pharmacy to avoid not having access to your prescription because your pharmacy has closed for the day.    If you are staying overnight following surgery, you will be transported to your hospital room following the recovery period.  [facility] has all private rooms.    If you have any questions please call Pre-Admission Testing at [phone].  Deductibles and co-payments are collected on the day of service. Please be prepared to pay the required co-pay, deductible or deposit on the day of service as defined by your plan.    Call your surgeon immediately if you experience any of the following symptoms:  Sore Throat  Shortness of Breath or difficulty breathing  Cough  Chills  Body soreness or muscle pain  Headache  Fever  New loss of taste or smell  Do not arrive for your surgery ill.  Your procedure will need to be rescheduled to another time.  You will need to call your physician before the day of surgery to avoid any unnecessary exposure to hospital staff as well as other patients.     CHLORHEXIDINE CLOTH INSTRUCTIONS  The morning of surgery follow these instructions using the Chlorhexidine cloths you've been given.  These steps reduce bacteria on the body.  Do not use  the cloths near your eyes, ears mouth, genitalia or on open wounds.  Throw the cloths away after use but do not try to flush them down a toilet.      Open and remove one cloth at a time from the package.    Leave the cloth unfolded and begin the bathing.  Massage the skin with the cloths using gentle pressure to remove bacteria.  Do not scrub harshly.   Follow the steps below with one 2% CHG cloth per area (6 total cloths).  One cloth for neck, shoulders and chest.  One cloth for both arms, hands, fingers and underarms (do underarms last).  One cloth for the abdomen followed by groin.  One cloth for right leg and foot including between the toes.  One cloth for left leg and foot including between the toes.  The last cloth is to be used for the back of the neck, back and buttocks.    Allow the CHG to air dry 3 minutes on the skin which will give it time to work and decrease the chance of irritation.  The skin may feel sticky until it is dry.  Do not rinse with water or any other liquid or you will lose the beneficial effects of the CHG.  If mild skin irritation occurs, do rinse the skin to remove the CHG.  Report this to the nurse at time of admission.  Do not apply lotions, creams, ointments, deodorants or perfumes after using the clothes. Dress in clean clothes before coming to the hospital.

## 2023-04-27 ENCOUNTER — OFFICE VISIT (OUTPATIENT)
Dept: ORTHOPEDIC SURGERY | Facility: CLINIC | Age: 53
End: 2023-04-27
Payer: COMMERCIAL

## 2023-04-27 VITALS — TEMPERATURE: 97.3 F | BODY MASS INDEX: 42 KG/M2 | HEIGHT: 64 IN | WEIGHT: 246 LBS

## 2023-04-27 DIAGNOSIS — R52 PAIN: Primary | ICD-10-CM

## 2023-04-27 NOTE — H&P (VIEW-ONLY)
Patient: Ce Lebron    Date of Admission: 5/3/2023    YOB: 1970    Medical Record Number: [id number]    Admitting Physician: Dr. James Coy    Reason for Admission: End Stage Left Knee OA    History of Present Illness: 52 y.o. female presents with severe end stage knee osteoarthritis which has not been responsive to the full compliment of conservative measures. Despite conservative attempts, there is still severe, constant activity-limiting pain. Given the severity of the pain, the patient has elected to proceed with knee replacement.    Allergies:   Allergies   Allergen Reactions   • Avocado Anaphylaxis   • Latex Anaphylaxis, Itching, Dermatitis and Rash     THROAT SWELLING   • Sulfa Antibiotics Itching   • Adhesive Tape Swelling     Gets some redness and swelling         Current Medications:  Home Medications:    Current Outpatient Medications on File Prior to Visit   Medication Sig   • albuterol sulfate HFA (ProAir HFA) 108 (90 Base) MCG/ACT inhaler Inhale 1 puff Every 4 (Four) Hours As Needed for Wheezing or Shortness of Air.   • cetirizine (zyrTEC) 10 MG tablet Take 1 tablet by mouth Daily As Needed.   • Chlorhexidine Gluconate Cloth 2 % pads Apply 1 application topically Take As Directed. Use as directed prior to OR   • Cyanocobalamin (VITAMIN B 12 PO) Take 1,000 mcg by mouth Daily.   • cyclobenzaprine (FLEXERIL) 10 MG tablet Take 1 tablet by mouth 3 (Three) Times a Day As Needed for Muscle Spasms.   • FLUoxetine (PROzac) 40 MG capsule Take 1 capsule by mouth Daily.   • fluticasone (FLONASE) 50 MCG/ACT nasal spray 2 sprays into the nostril(s) as directed by provider Daily As Needed.   • Insulin Pen Needle 32G X 4 MM misc Use daily with saxenda   • Liraglutide (SAXENDA) 18 MG/3ML injection pen Inject 3 mg under the skin into the appropriate area as directed Daily.   • meloxicam (MOBIC) 7.5 MG tablet Take 1 tablet by mouth Daily. (Patient taking differently: Take 1 tablet by mouth Daily. PT TO  HOLD 1 WEEK PRIOR)   • ondansetron (Zofran) 4 MG tablet Take 1 tablet by mouth Every 8 (Eight) Hours As Needed for Nausea or Vomiting.   • pseudoephedrine (SUDAFED) 30 MG tablet Take 2 tablets by mouth As Needed.   • spironolactone-hydrochlorothiazide (Aldactazide) 25-25 MG tablet Take 1 tablet by mouth Daily.     No current facility-administered medications on file prior to visit.     PRN Meds:.    PMH:     Past Medical History:   Diagnosis Date   • Allergic    • Anemia    • Ankle swelling    • Anxiety and depression    • Arthritis    • Back problem     LOW BACK PAIN, DISC ISSUES   • GERD (gastroesophageal reflux disease)    • Knee problem    • Left knee pain    • Low back pain    • Methicillin resistant Staph aureus culture positive     HX 10-15 YEARS AGO, NASAL, TREATED [facility]   • Obesity    • PONV (postoperative nausea and vomiting)    • Prediabetes    • S/P cervical spinal fusion    • Shortness of breath on exertion     FROM DECONDITIONING FROM KNEE PAIN   • SVT (supraventricular tachycardia)     HX, ANXIETY RELATED PER PT   • Tendinitis of right elbow        PF/Surg/Soc Hx:     Past Surgical History:   Procedure Laterality Date   • ADENOIDECTOMY     • ANTERIOR CERVICAL DISCECTOMY W/ FUSION      C6-7   • APPENDECTOMY  2012   •  SECTION     • DIAGNOSTIC LAPAROSCOPY, SALPINGO OOPHORECTOMY LAPAROSCOPIC     • ENDOMETRIAL ABLATION  10/2012   • EPIDURAL BLOCK     • HYSTERECTOMY     • LAPAROSCOPIC CHOLECYSTECTOMY  2005   • LASIK     • OOPHORECTOMY     • TONSILLECTOMY     • VENTRAL HERNIA REPAIR  2016        Social History     Occupational History   • Not on file   Tobacco Use   • Smoking status: Former     Packs/day: 1.00     Years: 8.00     Pack years: 8.00     Types: Cigarettes     Quit date:      Years since quittin.3   • Smokeless tobacco: Never   Vaping Use   • Vaping Use: Never used   Substance and Sexual Activity   • Alcohol use: Yes      "Comment: rarely   • Drug use: Never   • Sexual activity: Not Currently      Social History     Social History Narrative   • Not on file        Family History   Problem Relation Age of Onset   • Breast cancer Mother    • Stroke Mother    • Hypertension Mother    • Hyperlipidemia Mother    • Cancer Mother    • Diabetes Mother    • Mental illness Mother    • Depression Mother    • Hypertension Father    • Hyperlipidemia Father    • Drug abuse Sister    • Depression Sister    • Mental illness Sister    • ADD / ADHD Son    • Asthma Son    • Heart disease Maternal Grandmother    • Colon cancer Maternal Grandfather    • Throat cancer Paternal Grandfather    • Malig Hyperthermia Neg Hx          Review of Systems:   A 14 point review of systems was performed, pertinent positives discussed above, all other systems are negative    Physical Exam: 52 y.o. female  Vital Signs :   Vitals:    04/27/23 1355   Temp: 97.3 °F (36.3 °C)   TempSrc: Temporal   Weight: 112 kg (246 lb)   Height: 161.3 cm (63.5\")   PainSc:   2   PainLoc: Knee     General: Alert and Oriented x 3, No acute distress.  Psych: mood and affect appropriate; recent and remote memory intact  Eyes: conjunctivae clear; pupils equally round and reactive, sclerae anicteric  CV: no peripheral edema  Resp: normal respiratory effort  Skin: no rashes or wounds; normal turgor  Musculosketetal; pain and crepitance with knee range of motion  Vascular: palpable distal pulses    Xrays:  -3 views (AP, lateral, and sunrise) were ordered and reviewed demonstrating end-stage OA with bone on bone articulation.  -A full length AP xray was ordered and reviewed today for purposes of operative alignment demonstrating end stage arthritic findings. There are no previous full length films for review    Assessment:  End-stage Left knee osteoarthritis. Conservative measures have failed.      Plan:  The plan is to proceed with Left Total Knee Replacement. The patient voiced understanding of the " risks, benefits, and alternative forms of treatment that were discussed with Dr Coy at the time of scheduling.  23-hour home health    Ana Adkins, APRN  4/27/2023

## 2023-04-27 NOTE — H&P
Patient: Ce Lebron    Date of Admission: 5/3/2023    YOB: 1970    Medical Record Number: [id number]    Admitting Physician: Dr. James Coy    Reason for Admission: End Stage Left Knee OA    History of Present Illness: 52 y.o. female presents with severe end stage knee osteoarthritis which has not been responsive to the full compliment of conservative measures. Despite conservative attempts, there is still severe, constant activity-limiting pain. Given the severity of the pain, the patient has elected to proceed with knee replacement.    Allergies:   Allergies   Allergen Reactions   • Avocado Anaphylaxis   • Latex Anaphylaxis, Itching, Dermatitis and Rash     THROAT SWELLING   • Sulfa Antibiotics Itching   • Adhesive Tape Swelling     Gets some redness and swelling         Current Medications:  Home Medications:    Current Outpatient Medications on File Prior to Visit   Medication Sig   • albuterol sulfate HFA (ProAir HFA) 108 (90 Base) MCG/ACT inhaler Inhale 1 puff Every 4 (Four) Hours As Needed for Wheezing or Shortness of Air.   • cetirizine (zyrTEC) 10 MG tablet Take 1 tablet by mouth Daily As Needed.   • Chlorhexidine Gluconate Cloth 2 % pads Apply 1 application topically Take As Directed. Use as directed prior to OR   • Cyanocobalamin (VITAMIN B 12 PO) Take 1,000 mcg by mouth Daily.   • cyclobenzaprine (FLEXERIL) 10 MG tablet Take 1 tablet by mouth 3 (Three) Times a Day As Needed for Muscle Spasms.   • FLUoxetine (PROzac) 40 MG capsule Take 1 capsule by mouth Daily.   • fluticasone (FLONASE) 50 MCG/ACT nasal spray 2 sprays into the nostril(s) as directed by provider Daily As Needed.   • Insulin Pen Needle 32G X 4 MM misc Use daily with saxenda   • Liraglutide (SAXENDA) 18 MG/3ML injection pen Inject 3 mg under the skin into the appropriate area as directed Daily.   • meloxicam (MOBIC) 7.5 MG tablet Take 1 tablet by mouth Daily. (Patient taking differently: Take 1 tablet by mouth Daily. PT TO  HOLD 1 WEEK PRIOR)   • ondansetron (Zofran) 4 MG tablet Take 1 tablet by mouth Every 8 (Eight) Hours As Needed for Nausea or Vomiting.   • pseudoephedrine (SUDAFED) 30 MG tablet Take 2 tablets by mouth As Needed.   • spironolactone-hydrochlorothiazide (Aldactazide) 25-25 MG tablet Take 1 tablet by mouth Daily.     No current facility-administered medications on file prior to visit.     PRN Meds:.    PMH:     Past Medical History:   Diagnosis Date   • Allergic    • Anemia    • Ankle swelling    • Anxiety and depression    • Arthritis    • Back problem     LOW BACK PAIN, DISC ISSUES   • GERD (gastroesophageal reflux disease)    • Knee problem    • Left knee pain    • Low back pain    • Methicillin resistant Staph aureus culture positive     HX 10-15 YEARS AGO, NASAL, TREATED [facility]   • Obesity    • PONV (postoperative nausea and vomiting)    • Prediabetes    • S/P cervical spinal fusion    • Shortness of breath on exertion     FROM DECONDITIONING FROM KNEE PAIN   • SVT (supraventricular tachycardia)     HX, ANXIETY RELATED PER PT   • Tendinitis of right elbow        PF/Surg/Soc Hx:     Past Surgical History:   Procedure Laterality Date   • ADENOIDECTOMY     • ANTERIOR CERVICAL DISCECTOMY W/ FUSION      C6-7   • APPENDECTOMY  2012   •  SECTION     • DIAGNOSTIC LAPAROSCOPY, SALPINGO OOPHORECTOMY LAPAROSCOPIC     • ENDOMETRIAL ABLATION  10/2012   • EPIDURAL BLOCK     • HYSTERECTOMY     • LAPAROSCOPIC CHOLECYSTECTOMY  2005   • LASIK     • OOPHORECTOMY     • TONSILLECTOMY     • VENTRAL HERNIA REPAIR  2016        Social History     Occupational History   • Not on file   Tobacco Use   • Smoking status: Former     Packs/day: 1.00     Years: 8.00     Pack years: 8.00     Types: Cigarettes     Quit date:      Years since quittin.3   • Smokeless tobacco: Never   Vaping Use   • Vaping Use: Never used   Substance and Sexual Activity   • Alcohol use: Yes      "Comment: rarely   • Drug use: Never   • Sexual activity: Not Currently      Social History     Social History Narrative   • Not on file        Family History   Problem Relation Age of Onset   • Breast cancer Mother    • Stroke Mother    • Hypertension Mother    • Hyperlipidemia Mother    • Cancer Mother    • Diabetes Mother    • Mental illness Mother    • Depression Mother    • Hypertension Father    • Hyperlipidemia Father    • Drug abuse Sister    • Depression Sister    • Mental illness Sister    • ADD / ADHD Son    • Asthma Son    • Heart disease Maternal Grandmother    • Colon cancer Maternal Grandfather    • Throat cancer Paternal Grandfather    • Malig Hyperthermia Neg Hx          Review of Systems:   A 14 point review of systems was performed, pertinent positives discussed above, all other systems are negative    Physical Exam: 52 y.o. female  Vital Signs :   Vitals:    04/27/23 1355   Temp: 97.3 °F (36.3 °C)   TempSrc: Temporal   Weight: 112 kg (246 lb)   Height: 161.3 cm (63.5\")   PainSc:   2   PainLoc: Knee     General: Alert and Oriented x 3, No acute distress.  Psych: mood and affect appropriate; recent and remote memory intact  Eyes: conjunctivae clear; pupils equally round and reactive, sclerae anicteric  CV: no peripheral edema  Resp: normal respiratory effort  Skin: no rashes or wounds; normal turgor  Musculosketetal; pain and crepitance with knee range of motion  Vascular: palpable distal pulses    Xrays:  -3 views (AP, lateral, and sunrise) were ordered and reviewed demonstrating end-stage OA with bone on bone articulation.  -A full length AP xray was ordered and reviewed today for purposes of operative alignment demonstrating end stage arthritic findings. There are no previous full length films for review    Assessment:  End-stage Left knee osteoarthritis. Conservative measures have failed.      Plan:  The plan is to proceed with Left Total Knee Replacement. The patient voiced understanding of the " risks, benefits, and alternative forms of treatment that were discussed with Dr Coy at the time of scheduling.  23-hour home health    Ana Adkins, APRN  4/27/2023

## 2023-05-03 ENCOUNTER — HOSPITAL ENCOUNTER (OUTPATIENT)
Facility: HOSPITAL | Age: 53
Setting detail: OBSERVATION
Discharge: HOME HEALTH | End: 2023-05-04
Attending: ORTHOPAEDIC SURGERY | Admitting: ORTHOPAEDIC SURGERY
Payer: COMMERCIAL

## 2023-05-03 ENCOUNTER — ANESTHESIA (OUTPATIENT)
Dept: PERIOP | Facility: HOSPITAL | Age: 53
End: 2023-05-03
Payer: COMMERCIAL

## 2023-05-03 ENCOUNTER — ANESTHESIA EVENT (OUTPATIENT)
Dept: PERIOP | Facility: HOSPITAL | Age: 53
End: 2023-05-03
Payer: COMMERCIAL

## 2023-05-03 ENCOUNTER — APPOINTMENT (OUTPATIENT)
Dept: GENERAL RADIOLOGY | Facility: HOSPITAL | Age: 53
End: 2023-05-03
Payer: COMMERCIAL

## 2023-05-03 DIAGNOSIS — Z96.652 S/P TKR (TOTAL KNEE REPLACEMENT), LEFT: Primary | ICD-10-CM

## 2023-05-03 DIAGNOSIS — M17.12 PRIMARY OSTEOARTHRITIS OF LEFT KNEE: ICD-10-CM

## 2023-05-03 PROCEDURE — 25010000002 MIDAZOLAM PER 1 MG: Performed by: ANESTHESIOLOGY

## 2023-05-03 PROCEDURE — 25010000002 VANCOMYCIN 10 G RECONSTITUTED SOLUTION: Performed by: ORTHOPAEDIC SURGERY

## 2023-05-03 PROCEDURE — 97116 GAIT TRAINING THERAPY: CPT

## 2023-05-03 PROCEDURE — 25010000002 FENTANYL CITRATE (PF) 50 MCG/ML SOLUTION: Performed by: NURSE ANESTHETIST, CERTIFIED REGISTERED

## 2023-05-03 PROCEDURE — 25010000002 ONDANSETRON PER 1 MG: Performed by: NURSE ANESTHETIST, CERTIFIED REGISTERED

## 2023-05-03 PROCEDURE — 27447 TOTAL KNEE ARTHROPLASTY: CPT | Performed by: ORTHOPAEDIC SURGERY

## 2023-05-03 PROCEDURE — G0378 HOSPITAL OBSERVATION PER HR: HCPCS

## 2023-05-03 PROCEDURE — 25010000002 HYDROMORPHONE PER 4 MG: Performed by: NURSE ANESTHETIST, CERTIFIED REGISTERED

## 2023-05-03 PROCEDURE — C1776 JOINT DEVICE (IMPLANTABLE): HCPCS | Performed by: ORTHOPAEDIC SURGERY

## 2023-05-03 PROCEDURE — 25010000002 VANCOMYCIN 1 G RECONSTITUTED SOLUTION: Performed by: NURSE ANESTHETIST, CERTIFIED REGISTERED

## 2023-05-03 PROCEDURE — 25010000002 FENTANYL CITRATE (PF) 100 MCG/2ML SOLUTION: Performed by: NURSE ANESTHETIST, CERTIFIED REGISTERED

## 2023-05-03 PROCEDURE — 73560 X-RAY EXAM OF KNEE 1 OR 2: CPT

## 2023-05-03 PROCEDURE — 25010000002 CEFAZOLIN IN DEXTROSE 2-4 GM/100ML-% SOLUTION: Performed by: NURSE PRACTITIONER

## 2023-05-03 PROCEDURE — 25010000002 ROPIVACAINE PER 1 MG: Performed by: ANESTHESIOLOGY

## 2023-05-03 PROCEDURE — C9290 INJ, BUPIVACAINE LIPOSOME: HCPCS | Performed by: ORTHOPAEDIC SURGERY

## 2023-05-03 PROCEDURE — C1713 ANCHOR/SCREW BN/BN,TIS/BN: HCPCS | Performed by: ORTHOPAEDIC SURGERY

## 2023-05-03 PROCEDURE — 25010000002 CEFAZOLIN IN DEXTROSE 2-4 GM/100ML-% SOLUTION: Performed by: ORTHOPAEDIC SURGERY

## 2023-05-03 PROCEDURE — 25010000002 FENTANYL CITRATE (PF) 50 MCG/ML SOLUTION: Performed by: ANESTHESIOLOGY

## 2023-05-03 PROCEDURE — 25010000002 PROPOFOL 10 MG/ML EMULSION: Performed by: NURSE ANESTHETIST, CERTIFIED REGISTERED

## 2023-05-03 PROCEDURE — 0 BUPIVACAINE LIPOSOME 1.3 % SUSPENSION 20 ML VIAL: Performed by: ORTHOPAEDIC SURGERY

## 2023-05-03 PROCEDURE — 25010000002 DEXAMETHASONE SODIUM PHOSPHATE 20 MG/5ML SOLUTION: Performed by: NURSE ANESTHETIST, CERTIFIED REGISTERED

## 2023-05-03 PROCEDURE — 97161 PT EVAL LOW COMPLEX 20 MIN: CPT

## 2023-05-03 PROCEDURE — 25010000002 SUGAMMADEX 200 MG/2ML SOLUTION: Performed by: NURSE ANESTHETIST, CERTIFIED REGISTERED

## 2023-05-03 DEVICE — KNOTLESS TISSUE CONTROL DEVICE, UNDYED UNIDIRECTIONAL (ANTIBACTERIAL) SYNTHETIC ABSORBABLE DEVICE
Type: IMPLANTABLE DEVICE | Site: KNEE | Status: FUNCTIONAL
Brand: STRATAFIX

## 2023-05-03 DEVICE — GENESIS II POSTERIOR STABILIZED                                    HIGH FLEXION INSERT SIZE 3-4 11MM
Type: IMPLANTABLE DEVICE | Site: KNEE | Status: FUNCTIONAL
Brand: GENESIS II

## 2023-05-03 DEVICE — GENESIS II NON-POROUS TIBIAL                                    BASEPLATE SIZE 3 LEFT
Type: IMPLANTABLE DEVICE | Site: KNEE | Status: FUNCTIONAL
Brand: GENESIS II

## 2023-05-03 DEVICE — GENESIS II RESURFACING PATELLAR 35MM
Type: IMPLANTABLE DEVICE | Site: KNEE | Status: FUNCTIONAL
Brand: GENESIS II

## 2023-05-03 DEVICE — IMPLANTABLE DEVICE: Type: IMPLANTABLE DEVICE | Status: FUNCTIONAL

## 2023-05-03 DEVICE — VIOLET ANTIBACTERIAL POLYDIOXANONE, KNOTLESS TISSUE CONTROL DEVICE
Type: IMPLANTABLE DEVICE | Site: KNEE | Status: FUNCTIONAL
Brand: STRATAFIX

## 2023-05-03 DEVICE — LEGION PS OXINIUM FEMORAL SZ 4 LEFT
Type: IMPLANTABLE DEVICE | Site: KNEE | Status: FUNCTIONAL
Brand: LEGION

## 2023-05-03 DEVICE — PALACOS® R IS A FAST-CURING, RADIOPAQUE, POLY(METHYL METHACRYLATE)-BASED BONE CEMENT.PALACOS ® R CONTAINS THE X-RAY CONTRAST MEDIUM ZIRCONIUM DIOXIDE. TO IMPROVE VISIBILITY IN THE SURGICAL FIELD PALACOS ® R HAS BEEN COLOURED WITH CHLOROPHYLL (E141). THE BONE CEMENT IS PREPARED DIRECTLY BEFORE USE BY MIXING A POLYMER POWDER COMPONENT WITH A LIQUID MONOMER COMPONENT. A DUCTILE DOUGH FORMS WHICH CURES WITHIN A FEW MINUTES.
Type: IMPLANTABLE DEVICE | Site: KNEE | Status: FUNCTIONAL
Brand: PALACOS®

## 2023-05-03 RX ORDER — FLUMAZENIL 0.1 MG/ML
0.2 INJECTION INTRAVENOUS AS NEEDED
Status: DISCONTINUED | OUTPATIENT
Start: 2023-05-03 | End: 2023-05-03 | Stop reason: HOSPADM

## 2023-05-03 RX ORDER — ONDANSETRON 4 MG/1
4 TABLET, FILM COATED ORAL EVERY 6 HOURS PRN
Status: DISCONTINUED | OUTPATIENT
Start: 2023-05-03 | End: 2023-05-04 | Stop reason: HOSPADM

## 2023-05-03 RX ORDER — LIDOCAINE HYDROCHLORIDE 20 MG/ML
INJECTION, SOLUTION INTRAVENOUS AS NEEDED
Status: DISCONTINUED | OUTPATIENT
Start: 2023-05-03 | End: 2023-05-03 | Stop reason: SURG

## 2023-05-03 RX ORDER — DEXAMETHASONE SODIUM PHOSPHATE 4 MG/ML
INJECTION, SOLUTION INTRA-ARTICULAR; INTRALESIONAL; INTRAMUSCULAR; INTRAVENOUS; SOFT TISSUE AS NEEDED
Status: DISCONTINUED | OUTPATIENT
Start: 2023-05-03 | End: 2023-05-03 | Stop reason: SURG

## 2023-05-03 RX ORDER — SCOLOPAMINE TRANSDERMAL SYSTEM 1 MG/1
1 PATCH, EXTENDED RELEASE TRANSDERMAL ONCE
Status: DISCONTINUED | OUTPATIENT
Start: 2023-05-03 | End: 2023-05-03

## 2023-05-03 RX ORDER — SODIUM CHLORIDE 0.9 % (FLUSH) 0.9 %
3-10 SYRINGE (ML) INJECTION AS NEEDED
Status: DISCONTINUED | OUTPATIENT
Start: 2023-05-03 | End: 2023-05-03 | Stop reason: HOSPADM

## 2023-05-03 RX ORDER — SODIUM CHLORIDE, SODIUM LACTATE, POTASSIUM CHLORIDE, CALCIUM CHLORIDE 600; 310; 30; 20 MG/100ML; MG/100ML; MG/100ML; MG/100ML
9 INJECTION, SOLUTION INTRAVENOUS CONTINUOUS
Status: DISCONTINUED | OUTPATIENT
Start: 2023-05-03 | End: 2023-05-03

## 2023-05-03 RX ORDER — PROMETHAZINE HYDROCHLORIDE 25 MG/1
25 SUPPOSITORY RECTAL ONCE AS NEEDED
Status: DISCONTINUED | OUTPATIENT
Start: 2023-05-03 | End: 2023-05-03 | Stop reason: HOSPADM

## 2023-05-03 RX ORDER — ASPIRIN 81 MG/1
81 TABLET ORAL EVERY 12 HOURS SCHEDULED
Status: DISCONTINUED | OUTPATIENT
Start: 2023-05-04 | End: 2023-05-04 | Stop reason: HOSPADM

## 2023-05-03 RX ORDER — FENTANYL CITRATE 50 UG/ML
50 INJECTION, SOLUTION INTRAMUSCULAR; INTRAVENOUS
Status: DISCONTINUED | OUTPATIENT
Start: 2023-05-03 | End: 2023-05-03 | Stop reason: HOSPADM

## 2023-05-03 RX ORDER — ONDANSETRON 2 MG/ML
4 INJECTION INTRAMUSCULAR; INTRAVENOUS ONCE AS NEEDED
Status: DISCONTINUED | OUTPATIENT
Start: 2023-05-03 | End: 2023-05-04 | Stop reason: HOSPADM

## 2023-05-03 RX ORDER — VANCOMYCIN HYDROCHLORIDE 1 G/20ML
INJECTION, POWDER, LYOPHILIZED, FOR SOLUTION INTRAVENOUS AS NEEDED
Status: DISCONTINUED | OUTPATIENT
Start: 2023-05-03 | End: 2023-05-03 | Stop reason: SURG

## 2023-05-03 RX ORDER — PROMETHAZINE HYDROCHLORIDE 25 MG/1
25 TABLET ORAL ONCE AS NEEDED
Status: DISCONTINUED | OUTPATIENT
Start: 2023-05-03 | End: 2023-05-03 | Stop reason: HOSPADM

## 2023-05-03 RX ORDER — ACETAMINOPHEN 10 MG/ML
INJECTION, SOLUTION INTRAVENOUS AS NEEDED
Status: DISCONTINUED | OUTPATIENT
Start: 2023-05-03 | End: 2023-05-03 | Stop reason: SURG

## 2023-05-03 RX ORDER — PREGABALIN 75 MG/1
150 CAPSULE ORAL ONCE
Status: COMPLETED | OUTPATIENT
Start: 2023-05-03 | End: 2023-05-03

## 2023-05-03 RX ORDER — EPHEDRINE SULFATE 50 MG/ML
5 INJECTION, SOLUTION INTRAVENOUS ONCE AS NEEDED
Status: DISCONTINUED | OUTPATIENT
Start: 2023-05-03 | End: 2023-05-03 | Stop reason: HOSPADM

## 2023-05-03 RX ORDER — ASPIRIN 81 MG/1
TABLET ORAL
Qty: 60 TABLET | Refills: 0 | Status: SHIPPED | OUTPATIENT
Start: 2023-05-03

## 2023-05-03 RX ORDER — LIDOCAINE HYDROCHLORIDE 10 MG/ML
0.5 INJECTION, SOLUTION EPIDURAL; INFILTRATION; INTRACAUDAL; PERINEURAL ONCE AS NEEDED
Status: DISCONTINUED | OUTPATIENT
Start: 2023-05-03 | End: 2023-05-03 | Stop reason: HOSPADM

## 2023-05-03 RX ORDER — PROMETHAZINE HYDROCHLORIDE 12.5 MG/1
12.5 TABLET ORAL EVERY 4 HOURS PRN
Status: DISCONTINUED | OUTPATIENT
Start: 2023-05-03 | End: 2023-05-04 | Stop reason: HOSPADM

## 2023-05-03 RX ORDER — HYDROCHLOROTHIAZIDE 25 MG/1
25 TABLET ORAL DAILY
Status: DISCONTINUED | OUTPATIENT
Start: 2023-05-03 | End: 2023-05-04 | Stop reason: HOSPADM

## 2023-05-03 RX ORDER — HYDROMORPHONE HYDROCHLORIDE 1 MG/ML
0.5 INJECTION, SOLUTION INTRAMUSCULAR; INTRAVENOUS; SUBCUTANEOUS
Status: DISCONTINUED | OUTPATIENT
Start: 2023-05-03 | End: 2023-05-03 | Stop reason: HOSPADM

## 2023-05-03 RX ORDER — CEFAZOLIN SODIUM 2 G/100ML
2 INJECTION, SOLUTION INTRAVENOUS ONCE
Status: COMPLETED | OUTPATIENT
Start: 2023-05-03 | End: 2023-05-03

## 2023-05-03 RX ORDER — PHENYLEPHRINE HCL IN 0.9% NACL 1 MG/10 ML
SYRINGE (ML) INTRAVENOUS AS NEEDED
Status: DISCONTINUED | OUTPATIENT
Start: 2023-05-03 | End: 2023-05-03 | Stop reason: SURG

## 2023-05-03 RX ORDER — ONDANSETRON 2 MG/ML
4 INJECTION INTRAMUSCULAR; INTRAVENOUS ONCE AS NEEDED
Status: DISCONTINUED | OUTPATIENT
Start: 2023-05-03 | End: 2023-05-03 | Stop reason: HOSPADM

## 2023-05-03 RX ORDER — IPRATROPIUM BROMIDE AND ALBUTEROL SULFATE 2.5; .5 MG/3ML; MG/3ML
3 SOLUTION RESPIRATORY (INHALATION) ONCE AS NEEDED
Status: DISCONTINUED | OUTPATIENT
Start: 2023-05-03 | End: 2023-05-03 | Stop reason: HOSPADM

## 2023-05-03 RX ORDER — ONDANSETRON 2 MG/ML
INJECTION INTRAMUSCULAR; INTRAVENOUS AS NEEDED
Status: DISCONTINUED | OUTPATIENT
Start: 2023-05-03 | End: 2023-05-03 | Stop reason: SURG

## 2023-05-03 RX ORDER — ACETAMINOPHEN 325 MG/1
650 TABLET ORAL EVERY 6 HOURS PRN
Status: DISCONTINUED | OUTPATIENT
Start: 2023-05-03 | End: 2023-05-04 | Stop reason: HOSPADM

## 2023-05-03 RX ORDER — SODIUM CHLORIDE 0.9 % (FLUSH) 0.9 %
3 SYRINGE (ML) INJECTION EVERY 12 HOURS SCHEDULED
Status: DISCONTINUED | OUTPATIENT
Start: 2023-05-03 | End: 2023-05-03 | Stop reason: HOSPADM

## 2023-05-03 RX ORDER — MAGNESIUM HYDROXIDE 1200 MG/15ML
LIQUID ORAL AS NEEDED
Status: DISCONTINUED | OUTPATIENT
Start: 2023-05-03 | End: 2023-05-03 | Stop reason: HOSPADM

## 2023-05-03 RX ORDER — ROCURONIUM BROMIDE 10 MG/ML
INJECTION, SOLUTION INTRAVENOUS AS NEEDED
Status: DISCONTINUED | OUTPATIENT
Start: 2023-05-03 | End: 2023-05-03 | Stop reason: SURG

## 2023-05-03 RX ORDER — TRANEXAMIC ACID 100 MG/ML
INJECTION, SOLUTION INTRAVENOUS AS NEEDED
Status: DISCONTINUED | OUTPATIENT
Start: 2023-05-03 | End: 2023-05-03 | Stop reason: SURG

## 2023-05-03 RX ORDER — HYDROCODONE BITARTRATE AND ACETAMINOPHEN 7.5; 325 MG/1; MG/1
1 TABLET ORAL ONCE AS NEEDED
Status: DISCONTINUED | OUTPATIENT
Start: 2023-05-03 | End: 2023-05-03 | Stop reason: HOSPADM

## 2023-05-03 RX ORDER — HYDROCODONE BITARTRATE AND ACETAMINOPHEN 7.5; 325 MG/1; MG/1
1 TABLET ORAL EVERY 4 HOURS PRN
Status: DISCONTINUED | OUTPATIENT
Start: 2023-05-03 | End: 2023-05-04 | Stop reason: HOSPADM

## 2023-05-03 RX ORDER — MELOXICAM 15 MG/1
15 TABLET ORAL DAILY
Qty: 14 TABLET | Refills: 0 | Status: SHIPPED | OUTPATIENT
Start: 2023-05-03 | End: 2023-05-17

## 2023-05-03 RX ORDER — FAMOTIDINE 10 MG/ML
20 INJECTION, SOLUTION INTRAVENOUS ONCE
Status: COMPLETED | OUTPATIENT
Start: 2023-05-03 | End: 2023-05-03

## 2023-05-03 RX ORDER — ONDANSETRON 4 MG/1
4 TABLET, FILM COATED ORAL EVERY 8 HOURS PRN
Qty: 10 TABLET | Refills: 0 | Status: SHIPPED | OUTPATIENT
Start: 2023-05-03

## 2023-05-03 RX ORDER — HYDROCODONE BITARTRATE AND ACETAMINOPHEN 7.5; 325 MG/1; MG/1
1 TABLET ORAL EVERY 4 HOURS PRN
Qty: 56 TABLET | Refills: 0 | Status: SHIPPED | OUTPATIENT
Start: 2023-05-03

## 2023-05-03 RX ORDER — ALBUTEROL SULFATE 90 UG/1
1 AEROSOL, METERED RESPIRATORY (INHALATION) EVERY 4 HOURS PRN
Status: DISCONTINUED | OUTPATIENT
Start: 2023-05-03 | End: 2023-05-04 | Stop reason: HOSPADM

## 2023-05-03 RX ORDER — FLUOXETINE HYDROCHLORIDE 20 MG/1
40 CAPSULE ORAL DAILY
Status: DISCONTINUED | OUTPATIENT
Start: 2023-05-03 | End: 2023-05-04 | Stop reason: HOSPADM

## 2023-05-03 RX ORDER — UREA 10 %
1 LOTION (ML) TOPICAL NIGHTLY PRN
Status: DISCONTINUED | OUTPATIENT
Start: 2023-05-03 | End: 2023-05-04 | Stop reason: HOSPADM

## 2023-05-03 RX ORDER — POLYETHYLENE GLYCOL 3350 17 G/17G
17 POWDER, FOR SOLUTION ORAL 2 TIMES DAILY
Qty: 238 G | Refills: 0 | Status: SHIPPED | OUTPATIENT
Start: 2023-05-03 | End: 2023-05-10

## 2023-05-03 RX ORDER — HYDROCODONE BITARTRATE AND ACETAMINOPHEN 7.5; 325 MG/1; MG/1
2 TABLET ORAL EVERY 4 HOURS PRN
Status: DISCONTINUED | OUTPATIENT
Start: 2023-05-03 | End: 2023-05-04 | Stop reason: HOSPADM

## 2023-05-03 RX ORDER — LABETALOL HYDROCHLORIDE 5 MG/ML
5 INJECTION, SOLUTION INTRAVENOUS
Status: DISCONTINUED | OUTPATIENT
Start: 2023-05-03 | End: 2023-05-03 | Stop reason: HOSPADM

## 2023-05-03 RX ORDER — CYCLOBENZAPRINE HCL 10 MG
10 TABLET ORAL 3 TIMES DAILY PRN
Status: DISCONTINUED | OUTPATIENT
Start: 2023-05-03 | End: 2023-05-04 | Stop reason: HOSPADM

## 2023-05-03 RX ORDER — SPIRONOLACTONE 25 MG/1
25 TABLET ORAL DAILY
Status: DISCONTINUED | OUTPATIENT
Start: 2023-05-03 | End: 2023-05-04 | Stop reason: HOSPADM

## 2023-05-03 RX ORDER — DROPERIDOL 2.5 MG/ML
0.62 INJECTION, SOLUTION INTRAMUSCULAR; INTRAVENOUS
Status: DISCONTINUED | OUTPATIENT
Start: 2023-05-03 | End: 2023-05-03 | Stop reason: HOSPADM

## 2023-05-03 RX ORDER — FENTANYL CITRATE 50 UG/ML
INJECTION, SOLUTION INTRAMUSCULAR; INTRAVENOUS AS NEEDED
Status: DISCONTINUED | OUTPATIENT
Start: 2023-05-03 | End: 2023-05-03 | Stop reason: SURG

## 2023-05-03 RX ORDER — HYDRALAZINE HYDROCHLORIDE 20 MG/ML
5 INJECTION INTRAMUSCULAR; INTRAVENOUS
Status: DISCONTINUED | OUTPATIENT
Start: 2023-05-03 | End: 2023-05-03 | Stop reason: HOSPADM

## 2023-05-03 RX ORDER — DIPHENHYDRAMINE HYDROCHLORIDE 50 MG/ML
12.5 INJECTION INTRAMUSCULAR; INTRAVENOUS
Status: DISCONTINUED | OUTPATIENT
Start: 2023-05-03 | End: 2023-05-03 | Stop reason: HOSPADM

## 2023-05-03 RX ORDER — FENTANYL CITRATE 50 UG/ML
50 INJECTION, SOLUTION INTRAMUSCULAR; INTRAVENOUS ONCE AS NEEDED
Status: COMPLETED | OUTPATIENT
Start: 2023-05-03 | End: 2023-05-03

## 2023-05-03 RX ORDER — PROPOFOL 10 MG/ML
VIAL (ML) INTRAVENOUS AS NEEDED
Status: DISCONTINUED | OUTPATIENT
Start: 2023-05-03 | End: 2023-05-03 | Stop reason: SURG

## 2023-05-03 RX ORDER — CEFAZOLIN SODIUM 2 G/100ML
2 INJECTION, SOLUTION INTRAVENOUS EVERY 8 HOURS
Status: COMPLETED | OUTPATIENT
Start: 2023-05-03 | End: 2023-05-04

## 2023-05-03 RX ORDER — MELOXICAM 15 MG/1
15 TABLET ORAL ONCE
Status: COMPLETED | OUTPATIENT
Start: 2023-05-03 | End: 2023-05-03

## 2023-05-03 RX ORDER — NALOXONE HCL 0.4 MG/ML
0.2 VIAL (ML) INJECTION AS NEEDED
Status: DISCONTINUED | OUTPATIENT
Start: 2023-05-03 | End: 2023-05-03 | Stop reason: HOSPADM

## 2023-05-03 RX ORDER — OXYCODONE AND ACETAMINOPHEN 7.5; 325 MG/1; MG/1
1 TABLET ORAL EVERY 4 HOURS PRN
Status: DISCONTINUED | OUTPATIENT
Start: 2023-05-03 | End: 2023-05-03 | Stop reason: HOSPADM

## 2023-05-03 RX ORDER — MIDAZOLAM HYDROCHLORIDE 1 MG/ML
1 INJECTION INTRAMUSCULAR; INTRAVENOUS
Status: DISCONTINUED | OUTPATIENT
Start: 2023-05-03 | End: 2023-05-03 | Stop reason: HOSPADM

## 2023-05-03 RX ORDER — ROPIVACAINE HYDROCHLORIDE 5 MG/ML
INJECTION, SOLUTION EPIDURAL; INFILTRATION; PERINEURAL
Status: COMPLETED | OUTPATIENT
Start: 2023-05-03 | End: 2023-05-03

## 2023-05-03 RX ORDER — PANTOPRAZOLE SODIUM 40 MG/1
40 TABLET, DELAYED RELEASE ORAL DAILY
Qty: 14 TABLET | Refills: 0 | Status: SHIPPED | OUTPATIENT
Start: 2023-05-03 | End: 2023-05-17

## 2023-05-03 RX ADMIN — HYDROMORPHONE HYDROCHLORIDE 0.5 MG: 1 INJECTION, SOLUTION INTRAMUSCULAR; INTRAVENOUS; SUBCUTANEOUS at 11:32

## 2023-05-03 RX ADMIN — MELOXICAM 15 MG: 15 TABLET ORAL at 07:50

## 2023-05-03 RX ADMIN — Medication 100 MCG: at 09:51

## 2023-05-03 RX ADMIN — OXYCODONE HYDROCHLORIDE AND ACETAMINOPHEN 1 TABLET: 7.5; 325 TABLET ORAL at 11:47

## 2023-05-03 RX ADMIN — CEFAZOLIN SODIUM 2 G: 2 INJECTION, SOLUTION INTRAVENOUS at 18:18

## 2023-05-03 RX ADMIN — SCOPALAMINE 1 PATCH: 1 PATCH, EXTENDED RELEASE TRANSDERMAL at 08:14

## 2023-05-03 RX ADMIN — ACETAMINOPHEN 1000 MG: 10 INJECTION, SOLUTION INTRAVENOUS at 09:47

## 2023-05-03 RX ADMIN — SODIUM CHLORIDE, POTASSIUM CHLORIDE, SODIUM LACTATE AND CALCIUM CHLORIDE 500 ML: 600; 310; 30; 20 INJECTION, SOLUTION INTRAVENOUS at 07:49

## 2023-05-03 RX ADMIN — DEXAMETHASONE SODIUM PHOSPHATE 10 MG: 4 INJECTION, SOLUTION INTRAMUSCULAR; INTRAVENOUS at 09:33

## 2023-05-03 RX ADMIN — FENTANYL CITRATE 50 MCG: 50 INJECTION, SOLUTION INTRAMUSCULAR; INTRAVENOUS at 08:02

## 2023-05-03 RX ADMIN — PROPOFOL 150 MCG/KG/MIN: 10 INJECTION, EMULSION INTRAVENOUS at 09:34

## 2023-05-03 RX ADMIN — HYDROCODONE BITARTRATE AND ACETAMINOPHEN 1 TABLET: 7.5; 325 TABLET ORAL at 21:53

## 2023-05-03 RX ADMIN — TRANEXAMIC ACID 1000 MG: 1 INJECTION, SOLUTION INTRAVENOUS at 10:26

## 2023-05-03 RX ADMIN — FAMOTIDINE 20 MG: 10 INJECTION INTRAVENOUS at 08:14

## 2023-05-03 RX ADMIN — VANCOMYCIN HYDROCHLORIDE 1750 MG: 1 INJECTION, POWDER, LYOPHILIZED, FOR SOLUTION INTRAVENOUS at 10:42

## 2023-05-03 RX ADMIN — PROPOFOL 250 MG: 10 INJECTION, EMULSION INTRAVENOUS at 09:31

## 2023-05-03 RX ADMIN — PREGABALIN 150 MG: 75 CAPSULE ORAL at 07:50

## 2023-05-03 RX ADMIN — FENTANYL CITRATE 50 MCG: 50 INJECTION, SOLUTION INTRAMUSCULAR; INTRAVENOUS at 09:45

## 2023-05-03 RX ADMIN — HYDROMORPHONE HYDROCHLORIDE 0.5 MG: 1 INJECTION, SOLUTION INTRAMUSCULAR; INTRAVENOUS; SUBCUTANEOUS at 11:47

## 2023-05-03 RX ADMIN — ROCURONIUM BROMIDE 50 MG: 10 INJECTION, SOLUTION INTRAVENOUS at 09:33

## 2023-05-03 RX ADMIN — ONDANSETRON 4 MG: 2 INJECTION INTRAMUSCULAR; INTRAVENOUS at 09:31

## 2023-05-03 RX ADMIN — CEFAZOLIN SODIUM 2 G: 2 INJECTION, SOLUTION INTRAVENOUS at 09:17

## 2023-05-03 RX ADMIN — SUGAMMADEX 200 MG: 100 INJECTION, SOLUTION INTRAVENOUS at 10:59

## 2023-05-03 RX ADMIN — ROPIVACAINE HYDROCHLORIDE 15 ML: 5 INJECTION, SOLUTION EPIDURAL; INFILTRATION; PERINEURAL at 08:05

## 2023-05-03 RX ADMIN — FENTANYL CITRATE 50 MCG: 50 INJECTION, SOLUTION INTRAMUSCULAR; INTRAVENOUS at 12:17

## 2023-05-03 RX ADMIN — LIDOCAINE HYDROCHLORIDE 100 MG: 20 INJECTION, SOLUTION INTRAVENOUS at 09:31

## 2023-05-03 RX ADMIN — MIDAZOLAM 0.5 MG: 1 INJECTION INTRAMUSCULAR; INTRAVENOUS at 08:02

## 2023-05-03 RX ADMIN — VANCOMYCIN HYDROCHLORIDE 1750 MG: 10 INJECTION, POWDER, LYOPHILIZED, FOR SOLUTION INTRAVENOUS at 08:58

## 2023-05-03 RX ADMIN — SODIUM CHLORIDE, POTASSIUM CHLORIDE, SODIUM LACTATE AND CALCIUM CHLORIDE: 600; 310; 30; 20 INJECTION, SOLUTION INTRAVENOUS at 09:24

## 2023-05-03 RX ADMIN — SODIUM CHLORIDE, POTASSIUM CHLORIDE, SODIUM LACTATE AND CALCIUM CHLORIDE: 600; 310; 30; 20 INJECTION, SOLUTION INTRAVENOUS at 11:04

## 2023-05-03 NOTE — NURSING NOTE
Pt was held in the pacu because she was so sleepy after the two doses of iv pain medications and pain pill. Pt placed on 4 liters while asleep and when she woke up she was instructed to use IS. Pt was 98% on 4 liters  Pt was more alert when she arrived to the floor and was able to drink two diet cokes before going.  Pt's pain is well controled now. Pt was taken to the floor while on 2 liters and she was 98%. Alert and oriented and good pulses in both legs.

## 2023-05-03 NOTE — ANESTHESIA PROCEDURE NOTES
Peripheral Block      Patient location during procedure: holding area  Start time: 5/3/2023 8:05 AM  Stop time: 5/3/2023 8:09 AM  Reason for block: at surgeon's request and post-op pain management  Performed by  Anesthesiologist: Debby Felder MD  Preanesthetic Checklist  Completed: patient identified, IV checked, site marked, risks and benefits discussed, surgical consent, monitors and equipment checked, pre-op evaluation and timeout performed  Prep:  Pt Position: supine  Sterile barriers:gloves  Prep: ChloraPrep  Patient monitoring: continuous pulse oximetry, blood pressure monitoring and EKG  Procedure    Sedation: yes  Performed under: PNB  Guidance:ultrasound guided and nerve stimulator    ULTRASOUND INTERPRETATION.  Using ultrasound guidance a 20 G gauge needle was placed in close proximity to the femoral nerve, at which point, under ultrasound guidance anesthetic was injected in the area of the nerve and spread of the anesthesia was seen on ultrasound in close proximity thereto.  There were no abnormalities seen on ultrasound; a digital image was taken; and the patient tolerated the procedure with no complications. Images:still images obtained, printed/placed on chart    Laterality:left  Block Type:adductor canal block    Needle Type:echogenic  Needle Gauge:20 G      Medications Used: ropivacaine (NAROPIN) 0.5 % injection - Injection   15 mL - 5/3/2023 8:05:00 AM      Medications  Comment:Ultrasound guidance was used to view and verify medication disbursement.  Ultrasound guidance was used for needle placement.    Post Assessment  Injection Assessment: negative aspiration for heme, no paresthesia on injection and incremental injection  Patient Tolerance:comfortable throughout block  Complications:no  Additional Notes  Ultrasound guidance was used to guide needle placement, as well as to view and verify medication disbursement.

## 2023-05-03 NOTE — THERAPY EVALUATION
Patient Name: Ce YEBOAH Bernardino  : 1970    MRN: [id number]                              Today's Date: 5/3/2023       Admit Date: 5/3/2023    Visit Dx:     ICD-10-CM ICD-9-CM   1. S/P TKR (total knee replacement), left  Z96.652 V43.65   2. Primary osteoarthritis of left knee  M17.12 715.16     Patient Active Problem List   Diagnosis   • Low back pain   • Depression   • Gastroesophageal reflux disease   • History of tobacco use   • Prediabetes   • Vitamin D deficiency   • Anxiety   • Recurrent major depressive disorder, in full remission   • Primary osteoarthritis of left knee   • OA (osteoarthritis) of knee     Past Medical History:   Diagnosis Date   • Allergic    • Anemia    • Ankle swelling    • Anxiety and depression    • Arthritis    • Back problem     LOW BACK PAIN, DISC ISSUES   • GERD (gastroesophageal reflux disease)    • Knee problem    • Left knee pain    • Low back pain    • Methicillin resistant Staph aureus culture positive     HX 10-15 YEARS AGO, NASAL, TREATED [facility]   • Obesity    • PONV (postoperative nausea and vomiting)    • Prediabetes    • S/P cervical spinal fusion    • Shortness of breath on exertion     FROM DECONDITIONING FROM KNEE PAIN   • SVT (supraventricular tachycardia)     HX, ANXIETY RELATED PER PT   • Tendinitis of right elbow      Past Surgical History:   Procedure Laterality Date   • ADENOIDECTOMY     • ANTERIOR CERVICAL DISCECTOMY W/ FUSION      C6-7   • APPENDECTOMY  2012   •  SECTION     • DIAGNOSTIC LAPAROSCOPY, SALPINGO OOPHORECTOMY LAPAROSCOPIC     • ENDOMETRIAL ABLATION  10/2012   • EPIDURAL BLOCK     • HYSTERECTOMY     • LAPAROSCOPIC CHOLECYSTECTOMY  2005   • LASIK     • OOPHORECTOMY     • TONSILLECTOMY     • VENTRAL HERNIA REPAIR  2016      General Information     Row Name 23 1738          Physical Therapy Time and Intention    Document Type evaluation  -     Mode of Treatment individual  therapy;physical therapy  -     Row Name 05/03/23 1738          General Information    Patient Profile Reviewed yes  -     Prior Level of Function independent:;gait;transfer;bed mobility  -     Existing Precautions/Restrictions fall  -     Barriers to Rehab none identified  -     Row Name 05/03/23 1738          Living Environment    People in Home alone  Parents and son live next door  -     Row Name 05/03/23 1738          Home Main Entrance    Number of Stairs, Main Entrance three  -[facility] Name 05/03/23 1738          Stairs Within Home, Primary    Number of Stairs, Within Home, Primary other (see comments)  13 to bedroom, able to stay on main level of home  -     Row Name 05/03/23 1738          Cognition    Orientation Status (Cognition) oriented x 4  -[facility] Name 05/03/23 1738          Safety Issues, Functional Mobility    Impairments Affecting Function (Mobility) balance;endurance/activity tolerance;strength;pain;range of motion (ROM)  -           User Key  (r) = Recorded By, (t) = Taken By, (c) = Cosigned By    Initials Name Provider Type     Tessy Santos PT Physical Therapist               Mobility     [facility] Name 05/03/23 1738          Bed Mobility    Bed Mobility supine-sit  -     Supine-Sit [place] (Bed Mobility) standby assist;verbal cues  -     Assistive Device (Bed Mobility) bed rails;head of bed elevated  -[facility] Name 05/03/23 1738          Sit-Stand Transfer    Sit-Stand [place] (Transfers) contact guard;verbal cues  -     Assistive Device (Sit-Stand Transfers) walker, front-wheeled  -[facility] Name 05/03/23 1738          Gait/Stairs (Locomotion)    [place] Level (Gait) contact guard;verbal cues  [facility]     Assistive Device (Gait) walker, front-wheeled  -     Distance in Feet (Gait) 150ft  -     Deviations/Abnormal Patterns (Gait) antalgic;todd decreased;gait speed decreased;stride length decreased  [facility]     Bilateral Gait Deviations forward  flexed posture  -     Left Sided Gait Deviations heel strike decreased  -     Comment, (Gait/Stairs) Cued for heel strike, L knee flexion  -[facility] Name 05/03/23 1738          Mobility    Extremity Weight-bearing Status left lower extremity  -     Left Lower Extremity (Weight-bearing Status) weight-bearing as tolerated (WBAT)  -           User Key  (r) = Recorded By, (t) = Taken By, (c) = Cosigned By    Initials Name Provider Type     Tessy Santos PT Physical Therapist               Obj/Interventions     [facility] Name 05/03/23 1739          Range of Motion Comprehensive    General Range of Motion lower extremity range of motion deficits identified  -     Comment, General Range of Motion Expected post-op ROM deficits, L knee flexion grossly 90 degrees with heel slides  -BH     Row Name 05/03/23 1739          Strength Comprehensive (MMT)    General Manual Muscle Testing (MMT) Assessment lower extremity strength deficits identified  -     Comment, General Manual Muscle Testing (MMT) Assessment Expected post-op strength deficits, BLE grossly 4/5  -[facility] Name 05/03/23 1739          Motor Skills    Therapeutic Exercise --  5 reps TKA protocol  -BH     Row Name 05/03/23 1739          Balance    Balance Assessment sitting static balance;sitting dynamic balance;standing static balance;standing dynamic balance  -     Static Sitting Balance standby assist  -     Dynamic Sitting Balance standby assist  -     Position, Sitting Balance unsupported;sitting edge of bed  -     Static Standing Balance standby assist;verbal cues  -     Dynamic Standing Balance contact guard;verbal cues  -     Position/Device Used, Standing Balance supported;walker, front-wheeled  -     Balance Interventions sitting;sit to stand;standing;supported;static;dynamic  -BH     Row Name 05/03/23 1739          Sensory Assessment (Somatosensory)    Sensory Assessment (Somatosensory) LE sensation intact  -           User Key   (r) = Recorded By, (t) = Taken By, (c) = Cosigned By    Initials Name Provider Type     Tessy Santos, PT Physical Therapist               Goals/Plan     Row Name 05/03/23 1743          Bed Mobility Goal 1 (PT)    Activity/Assistive Device (Bed Mobility Goal 1, PT) bed mobility activities, all  -     [place] Level/Cues Needed (Bed Mobility Goal 1, PT) modified independence  -     Time Frame (Bed Mobility Goal 1, PT) 1 week  -[facility] Name 05/03/23 1743          Transfer Goal 1 (PT)    Activity/Assistive Device (Transfer Goal 1, PT) transfers, all  -     [place] Level/Cues Needed (Transfer Goal 1, PT) supervision required  -     Time Frame (Transfer Goal 1, PT) 1 week  -BH     Row Name 05/03/23 1743          Gait Training Goal 1 (PT)    Activity/Assistive Device (Gait Training Goal 1, PT) gait (walking locomotion)  -     [place] Level (Gait Training Goal 1, PT) supervision required  -     Distance (Gait Training Goal 1, PT) 200ft  -     Time Frame (Gait Training Goal 1, PT) 1 week  -BH     Row Name 05/03/23 1743          Stairs Goal 1 (PT)    Activity/Assistive Device (Stairs Goal 1, PT) stairs, all skills  -     [place] Level/Cues Needed (Stairs Goal 1, PT) contact guard required  -     Number of Stairs (Stairs Goal 1, PT) 3  -     Time Frame (Stairs Goal 1, PT) 1 week  -BH     Row Name 05/03/23 1743          Therapy Assessment/Plan (PT)    Planned Therapy Interventions (PT) balance training;bed mobility training;gait training;home exercise program;patient/family education;strengthening;stair training;ROM (range of motion);transfer training  -           User Key  (r) = Recorded By, (t) = Taken By, (c) = Cosigned By    Initials Name Provider Type     Tessy Santos PT Physical Therapist               Clinical Impression     [facility] Name 05/03/23 1740          Pain    Pretreatment Pain Rating 0/10 - no pain  -     Posttreatment Pain Rating 3/10  -     Pain  Location - Side/Orientation Left  -     Pain Location incisional  -     Pain Location - knee  -     Pain Intervention(s) Repositioned;Cold applied;Ambulation/increased activity;Rest  -     Row Name 05/03/23 1740          Plan of Care Review    Plan of Care Reviewed With patient  -     Outcome Evaluation Pt is a 53 yo F POD 0 L TKA. Pt lives alone, reports her parents and son live next door and are able to assist/check on pt but she does not plan to have anyone stay with her. Pt denies use of an AD at [facility], has a rwx. Pt has 3-4 GABRIEL with 13 steps up to her bedroom, reports she is able to stay on the main floor if needed. Pt presents to PT with impaired strength, endurance, and pain limiting overall mobility. Pt transferred to EOB with SBA, STS with CGA, and ambulated 150ft with CGA and rwx. Pt cued for increased heel strike and knee flexion on L, otherwise good gait mechanics. Pt returned to room, completed toilet transfer independently and left sitting UIC with needs met. PT will continue to follow to progress mobility as tolerated. Anticipate DC home with HHPT and family to assist as needed.  -     Row Name 05/03/23 1740          Therapy Assessment/Plan (PT)    Patient/Family Therapy Goals Statement (PT) Return to [facility]  -     Rehab Potential (PT) good, to achieve stated therapy goals  [facility]     Criteria for Skilled Interventions Met (PT) yes  -     Therapy Frequency (PT) daily  -     Row Name 05/03/23 1740          Vital Signs    O2 Delivery Pre Treatment supplemental O2  -     O2 Delivery Intra Treatment room air  -     Post SpO2 (%) 97  -     O2 Delivery Post Treatment room air  -     Row Name 05/03/23 1740          Positioning and Restraints    Pre-Treatment Position in bed  -     Post Treatment Position chair  -     In Chair reclined;call light within reach;encouraged to call for assist;exit alarm on  -           User Key  (r) = Recorded By, (t) = Taken By, (c) = Cosigned By     Initials Name Provider Type     Tessy Santos PT Physical Therapist               Outcome Measures     Row Name 05/03/23 1744          How much help from another person do you currently need...    Turning from your back to your side while in flat bed without using bedrails? 4  -BH     Moving from lying on back to sitting on the side of a flat bed without bedrails? 3  -BH     Moving to and from a bed to a chair (including a wheelchair)? 3  -BH     Standing up from a chair using your arms (e.g., wheelchair, bedside chair)? 3  -BH     Climbing 3-5 steps with a railing? 3  -BH     To walk in hospital room? 3  -     AM-PAC 6 Clicks Score (PT) 19  -     Highest level of mobility 6 --> Walked 10 steps or more  -     Row Name 05/03/23 1744          Functional Assessment    Outcome Measure Options AM-PAC 6 Clicks Basic Mobility (PT)  -           User Key  (r) = Recorded By, (t) = Taken By, (c) = Cosigned By    Initials Name Provider Type     Tessy Santos PT Physical Therapist                             Physical Therapy Education     Title: PT OT SLP Therapies (Done)     Topic: Physical Therapy (Done)     Point: Mobility training (Done)     Learning Progress Summary           Patient Acceptance, E,TB,D, VU,NR by  at 5/3/2023 1744                   Point: Home exercise program (Done)     Learning Progress Summary           Patient Acceptance, E,TB,D, VU,NR by  at 5/3/2023 1744                   Point: Body mechanics (Done)     Learning Progress Summary           Patient Acceptance, E,TB,D, VU,NR by  at 5/3/2023 1744                   Point: Precautions (Done)     Learning Progress Summary           Patient Acceptance, E,TB,D, VU,NR by  at 5/3/2023 1744                               User Key     Initials Effective Dates Name Provider Type [facility] 04/08/22 -  Tessy Santos PT Physical Therapist PT              PT Recommendation and Plan  Planned Therapy Interventions (PT): balance  training, bed mobility training, gait training, home exercise program, patient/family education, strengthening, stair training, ROM (range of motion), transfer training  Plan of Care Reviewed With: patient  Outcome Evaluation: Pt is a 51 yo F POD 0 L TKA. Pt lives alone, reports her parents and son live next door and are able to assist/check on pt but she does not plan to have anyone stay with her. Pt denies use of an AD at [facility], has a rwx. Pt has 3-4 GABRIEL with 13 steps up to her bedroom, reports she is able to stay on the main floor if needed. Pt presents to PT with impaired strength, endurance, and pain limiting overall mobility. Pt transferred to EOB with SBA, STS with CGA, and ambulated 150ft with CGA and rwx. Pt cued for increased heel strike and knee flexion on L, otherwise good gait mechanics. Pt returned to room, completed toilet transfer independently and left sitting UIC with needs met. PT will continue to follow to progress mobility as tolerated. Anticipate DC home with HHPT and family to assist as needed.     Time Calculation:    PT Charges     Row Name 05/03/23 1744             Time Calculation    Start Time 1626  -      Stop Time 1645  -      Time Calculation (min) 19 min  -      PT Received On 05/03/23  -      PT - Next Appointment 05/04/23  -      PT Goal Re-Cert Due Date 05/10/23  -         Time Calculation- PT    Total Timed Code Minutes- PT 15 minute(s)  -         Timed Charges    04153 - Gait Training Minutes  8  -      09956 - PT Therapeutic Activity Minutes 7  -         Untimed Charges    PT Eval/Re-eval Minutes 5  -         Total Minutes    Timed Charges Total Minutes 15  -      Untimed Charges Total Minutes 5  -       Total Minutes 20  -            User Key  (r) = Recorded By, (t) = Taken By, (c) = Cosigned By    Initials Name Provider Type    BH Tessy Santos, PT Physical Therapist              Therapy Charges for Today     Code Description Service Date Service  Provider Modifiers Qty    85993033809  GAIT TRAINING EA 15 MIN 5/3/2023 Tessy Santos, PT GP 1    [id number] HC PT EVAL LOW COMPLEXITY 3 5/3/2023 Tessy Santos, PT GP 1          PT G-Codes  Outcome Measure Options: AM-PAC 6 Clicks Basic Mobility (PT)  AM-PAC 6 Clicks Score (PT): 19  PT Discharge Summary  Anticipated Discharge Disposition (PT): home with assist, home with home health    Tessy Santos, PT  5/3/2023

## 2023-05-03 NOTE — OP NOTE
Name: Ce Lebron    YOB: 1970    DATE OF SURGERY: 5/3/2023    PREOPERATIVE DIAGNOSIS: Left knee end-stage osteoarthritis    POSTOPERATIVE DIAGNOSIS: Left knee end-stage osteoarthritis    PROCEDURE PERFORMED: Left total knee replacement     SURGEON: James Coy M.D.    ASSISTANT: JOHN DUNBAR    A surgical assistant was integral in ensuring a successful outcome with this procedure.  The assistant was utilized to assist in positioning the patient, draping the patient, was used throughout the case to provide with retraction of tissues, suctioning of blood and body fluids for visualization, positioning of the extremity to allow for proper exposure so that I could perform the procedure.  Without the use of a surgical assistant during this procedure I feel that the outcome may have been compromised or would have been suboptimal or at risk for complications.    IMPLANTS: [employer]:     Implant Name Type Inv. Item Serial No.  Lot No. LRB No. Used Action   CMT BONE PALACOS R HI/VISC 1X40 - [id number] Implant CMT BONE PALACOS R HI/VISC 1X40  [facility] [id number] Left 1 Implanted   DEV CONTRL TISS STRATAFIX SYMM PDS PLUS LYNN CT-1 60CM - [id number] Implant DEV CONTRL TISS STRATAFIX SYMM PDS PLUS LYNN CT-1 60CM  [employer]  DIV [employer] J [employer] J [id number] Left 1 Implanted   DEV CONTRL TISS STRATAFIXSPIRALMNCRYL PLSPS2 REV3/0 45CM - [id number] Implant DEV CONTRL TISS STRATAFIXSPIRALMNCRYL PLSPS2 REV3/0 45CM  [employer]  DIV OF J AND J [id number] Left 1 Implanted   COMP FEM LEGION OXINIUM PS SZ4 LT - [id number] Implant COMP FEM LEGION OXINIUM PS SZ4 LT  [employer] [id number] Left 1 Implanted   BASE TIB/KN GEN2 NONPOR TI SZ3 LT - [id number] Implant BASE TIB/KN GEN2 NONPOR TI SZ3 LT  [employer] [id number] Left 1 Implanted   PAT GEN2 RESRF 35MM - [id number] Implant PAT GEN2 RESRF 35MM  [employer] [id number] Left 1 Implanted   INSRT TIB FLX HI GEN2 PS SZ3TO4 11MM - [id number] Implant  INSRT TIB FLX HI GEN2 PS SZ3TO4 11MM  [employer] [id number] Left 1 Implanted       Estimated Blood Loss: 200cc  Specimens : none  Complications: none    DESCRIPTION OF PROCEDURE: The patient was taken to the operating room and placed in the supine position. A sequential compression device was carefully placed on the non-operative leg. Preoperative antibiotics were administered. Surgical time out was performed. After adequate induction of anesthesia, the leg was prepped and draped in the usual sterile fashion, exsanguinated with an Esmarch bandage and the tourniquet inflated to 250 mmHg. A midline incision was performed followed by a medial parapatellar arthrotomy. The patella was subluxed laterally.  A portion of the fat pad, ACL, and anterior horns of the meniscus were excised.  A drill hole was then placed in the center of the femoral canal in line with the canal.  It was irrigated and suctioned.  The intramedullary toya was then placed and a 5 degree distal valgus cut was performed after the block pinned in place appropriately.  Cut surface was then removed.  The sizing and rotation guide was then placed and seated appropriately.  It was sized and then the drill holes for the 4-in-1 cutting guide were placed in 3 degrees of external rotation based off of the posterior condyles.  The 4-in-1 cutting block was then placed and the femoral cuts were performed.  The excess bone was removed and the cut surfaces looked good.  At this point we placed the retractors around the proximal tibia and a slight release of the PCL fibers off of the posterior proximal tibia was performed.  We used the extramedullary tibial alignment guide and it was aligned appropriately and then the depth was set and the block pinned in place.  The tibial cut was then performed and the alignment guide was removed.  The tibial cut was removed and the cut surface looked good.  The posterior horns of the menisci were then removed as well as the  posterior osteophytes.  Flexion extension blocks were then used to check the balance of the knee. The tibial cut surface was then sized with the sizing templates and the tibial and femoral trial were then placed. The knee was placed in full extension and then the tibial tray rotation was then matched to the femoral rotation and marked.    Attention was then placed to the patella. The patella was noted to track centrally through range of motion. The patella was then sized with the trials. The thickness of the patella was then measured. The patella was resurfaced and the surrounding osteophytes were removed. The preoperative thickness was reproduced. The patella tracked centrally through range of motion.  We then checked the balance with the trial implants in place and there was excellent medial lateral and flexion-extension balance.  The tibial and femoral arrays were then removed.  The checkpoint markers were then removed.   At this point all trial components were removed, the knee was copiously irrigated with pulsed lavage, and the knee was injected with anesthetic cocktail solution. The cut surfaces were then dried with clean lap sponges, and the components were cemented tibia, followed by femur, then patella. The knee was held in full extension and all excess cement was removed. The knee was held still until the cement had completely hardened. We then placed the trial polyethylene spacer which resulted in full extension and excellent flexion-extension balance. We placed the final polyethylene spacer.   The knee was then copiously irrigated. The tourniquet was then released. There was excellent hemostasis. We placed a one-eighth inch Hemovac drain. We closed the knee in multiple layers in standard fashion. Sterile dressing were applied. At the end of the case, the sponge and needle counts were reported as being correct. There were no known complications. The patient was then transported to the recovery  [facility].      James Coy M.D.

## 2023-05-03 NOTE — PLAN OF CARE
Goal Outcome Evaluation:      Patient arrived to floor VSS and able to walk from stretcher to bed. Pain mild.

## 2023-05-03 NOTE — ANESTHESIA POSTPROCEDURE EVALUATION
Patient: Ce YEBOAH Bernardino    Procedure Summary     Date: 05/03/23 Room / Location: [facility] OR [address] [place] [address] [facility]    Anesthesia Start: 0924 Anesthesia Stop: 1125    Procedure: TOTAL KNEE ARTHROPLASTY (Left: Knee) Diagnosis:       Primary osteoarthritis of left knee      (Primary osteoarthritis of left knee [M17.12])    Surgeons: James Coy MD Provider: Patti Zuniga MD    Anesthesia Type: general with block ASA Status: 3          Anesthesia Type: general with block    Vitals  Vitals Value Taken Time   /73 05/03/23 1400   Temp 36.5 °C (97.7 °F) 05/03/23 1122   Pulse 106 05/03/23 1417   Resp 18 05/03/23 1245   SpO2 93 % 05/03/23 1417   Vitals shown include unvalidated device data.        Post Anesthesia Care and Evaluation    Patient location during evaluation: bedside  Patient participation: complete - patient participated  Level of consciousness: awake and alert  Pain management: adequate    Airway patency: patent  Anesthetic complications: No anesthetic complications  PONV Status: controlled  Cardiovascular status: acceptable  Respiratory status: acceptable  Hydration status: acceptable

## 2023-05-03 NOTE — ANESTHESIA PREPROCEDURE EVALUATION
Anesthesia Evaluation     history of anesthetic complications: PONV               Airway   Mallampati: I  TM distance: >3 FB  Neck ROM: full  No difficulty expected and Large neck circumference  Dental      Pulmonary    (+) shortness of breath,   Cardiovascular - normal exam    (+) dysrhythmias Tachycardia,       Neuro/Psych  (+) psychiatric history Anxiety and Depression,    GI/Hepatic/Renal/Endo    (+) obesity, morbid obesity, GERD,      Musculoskeletal     Abdominal    Substance History      OB/GYN          Other   arthritis,                    Anesthesia Plan    ASA 3     general with block     intravenous induction     Anesthetic plan, risks, benefits, and alternatives have been provided, discussed and informed consent has been obtained with: patient.        CODE STATUS:

## 2023-05-03 NOTE — ANESTHESIA PROCEDURE NOTES
Airway  Urgency: elective    Date/Time: 5/3/2023 9:36 AM  Airway not difficult    General Information and Staff    Patient location during procedure: OR  CRNA/CAA: Jamir Cali, POLA    Indications and Patient Condition  Indications for airway management: airway protection    Preoxygenated: yes  MILS maintained throughout  Mask difficulty assessment: 2 - vent by mask + OA or adjuvant +/- NMBA    Final Airway Details  Final airway type: endotracheal airway      Successful airway: ETT  Cuffed: yes   Successful intubation technique: direct laryngoscopy  Facilitating devices/methods: intubating stylet and anterior pressure/BURP  Endotracheal tube insertion site: oral  Blade: Salazar  Blade size: 3  ETT size (mm): 7.0  Cormack-Lehane Classification: grade I - full view of glottis  Placement verified by: chest auscultation and capnometry   Measured from: gums  ETT/EBT to gums (cm): 22  Number of attempts at approach: 1  Assessment: lips, teeth, and gum same as pre-op and atraumatic intubation

## 2023-05-03 NOTE — PLAN OF CARE
Goal Outcome Evaluation:  Plan of Care Reviewed With: patient           Outcome Evaluation: Pt is a 51 yo F POD 0 L TKA. Pt lives alone, reports her parents and son live next door and are able to assist/check on pt but she does not plan to have anyone stay with her. Pt denies use of an AD at [facility], has a rwx. Pt has 3-4 GABRIEL with 13 steps up to her bedroom, reports she is able to stay on the main floor if needed. Pt presents to PT with impaired strength, endurance, and pain limiting overall mobility. Pt transferred to EOB with SBA, STS with CGA, and ambulated 150ft with CGA and rwx. Pt cued for increased heel strike and knee flexion on L, otherwise good gait mechanics. Pt returned to room, completed toilet transfer independently and left sitting UIC with needs met. PT will continue to follow to progress mobility as tolerated. Anticipate DC home with HHPT and family to assist as needed.

## 2023-05-04 ENCOUNTER — HOME HEALTH ADMISSION (OUTPATIENT)
Dept: HOME HEALTH SERVICES | Facility: HOME HEALTHCARE | Age: 53
End: 2023-05-04
Payer: COMMERCIAL

## 2023-05-04 ENCOUNTER — READMISSION MANAGEMENT (OUTPATIENT)
Dept: CALL CENTER | Facility: HOSPITAL | Age: 53
End: 2023-05-04
Payer: COMMERCIAL

## 2023-05-04 VITALS
HEIGHT: 64 IN | DIASTOLIC BLOOD PRESSURE: 71 MMHG | OXYGEN SATURATION: 94 % | RESPIRATION RATE: 16 BRPM | BODY MASS INDEX: 41.83 KG/M2 | WEIGHT: 245 LBS | SYSTOLIC BLOOD PRESSURE: 129 MMHG | HEART RATE: 98 BPM | TEMPERATURE: 98.1 F

## 2023-05-04 LAB
HCT VFR BLD AUTO: 32.7 % (ref 34–46.6)
HGB BLD-MCNC: 10.7 G/DL (ref 12–15.9)

## 2023-05-04 PROCEDURE — 25010000002 CEFAZOLIN IN DEXTROSE 2-4 GM/100ML-% SOLUTION: Performed by: NURSE PRACTITIONER

## 2023-05-04 PROCEDURE — 97116 GAIT TRAINING THERAPY: CPT

## 2023-05-04 PROCEDURE — 85014 HEMATOCRIT: CPT | Performed by: NURSE PRACTITIONER

## 2023-05-04 PROCEDURE — 99024 POSTOP FOLLOW-UP VISIT: CPT | Performed by: NURSE PRACTITIONER

## 2023-05-04 PROCEDURE — 85018 HEMOGLOBIN: CPT | Performed by: NURSE PRACTITIONER

## 2023-05-04 PROCEDURE — G0378 HOSPITAL OBSERVATION PER HR: HCPCS

## 2023-05-04 RX ADMIN — SPIRONOLACTONE 25 MG: 25 TABLET ORAL at 07:30

## 2023-05-04 RX ADMIN — FLUOXETINE HYDROCHLORIDE 40 MG: 20 CAPSULE ORAL at 07:30

## 2023-05-04 RX ADMIN — CEFAZOLIN SODIUM 2 G: 2 INJECTION, SOLUTION INTRAVENOUS at 01:27

## 2023-05-04 RX ADMIN — ASPIRIN 81 MG: 81 TABLET, COATED ORAL at 07:30

## 2023-05-04 RX ADMIN — HYDROCODONE BITARTRATE AND ACETAMINOPHEN 1 TABLET: 7.5; 325 TABLET ORAL at 07:30

## 2023-05-04 RX ADMIN — HYDROCODONE BITARTRATE AND ACETAMINOPHEN 1 TABLET: 7.5; 325 TABLET ORAL at 02:58

## 2023-05-04 RX ADMIN — HYDROCHLOROTHIAZIDE 25 MG: 25 TABLET ORAL at 07:30

## 2023-05-04 NOTE — CASE MANAGEMENT/SOCIAL WORK
Case Management Discharge Note      Final Note: pt d/c home with [facility]         Selected Continued Care - Discharged on 5/4/2023 Admission date: 5/3/2023 - Discharge disposition: Home-Health Care Svc    Destination    No services have been selected for the patient.              Durable Medical Equipment    No services have been selected for the patient.              Dialysis/Infusion    No services have been selected for the patient.              Home Medical Care Coordination complete.    Service Provider Selected Services Address Phone Fax Patient Preferred    [facility] [address] [place] [id number] [phone] --          Therapy    No services have been selected for the patient.              Community Resources    No services have been selected for the patient.              Community & DME    No services have been selected for the patient.                       Final Discharge Disposition Code: 06 - home with home health care

## 2023-05-04 NOTE — PLAN OF CARE
Goal Outcome Evaluation:  Plan of Care Reviewed With: patient           Outcome Evaluation: Patient 53 yo/female, POD1 TOTAL KNEE ARTHROPLASTY - Left, vital signs stable, alert and oriented x 4, voiding well, ambulates to bathroom and back to bed with walker and gait belt, stand by assist. SCD's on, on Hemovac and AMENA dressing clean, dry and intact. complaint of pain, NORCO PRN given. needs attended.

## 2023-05-04 NOTE — CASE MANAGEMENT/SOCIAL WORK
Case Management Discharge Note      Final Note: pt d/c home         Selected Continued Care - Discharged on 5/4/2023 Admission date: 5/3/2023 - Discharge disposition: Home-Health Care Svc    Destination    No services have been selected for the patient.              Durable Medical Equipment    No services have been selected for the patient.              Dialysis/Infusion    No services have been selected for the patient.              Home Medical Care Coordination complete.    Service Provider Selected Services Address Phone Fax Patient Preferred    [facility] [address] [address] [phone] --          Therapy    No services have been selected for the patient.              Community Resources    No services have been selected for the patient.              Community & DME    No services have been selected for the patient.                       Final Discharge Disposition Code: 01 - home or self-care

## 2023-05-04 NOTE — DISCHARGE SUMMARY
Patient Name: Ce Lebron  Patient YOB: 1970    Date of Admission:  5/3/2023  Date of Discharge:  5/4/2023  Discharge Diagnosis: NC ARTHRP KNE CONDYLE&PLATU MEDIAL&LAT COMPARTMENTS [70174] (TOTAL KNEE ARTHROPLASTY)  Presenting Problem/History of Present Illness: Primary osteoarthritis of left knee [M17.12]  OA (osteoarthritis) of knee [M17.9]  Admitting Physician: Dr James Coy  Consults:   Consults     No orders found for last 30 day(s).          DETAILS OF HOSPITAL STAY:  Patient is a 52 y.o. female was admitted to the floor following the above procedure and underwent an uncomplicated hospital stay.  Patient did well with physical therapy and was ambulating well without problems.  On the day of discharge the wound was clean, dry and intact and calf was soft and nontender and Homans sign was negative.  Patient was tolerating  without problems.  Patient will be discharged home.    Condition on Discharge:  Stable    Vital Signs  Temp:  [97.7 °F (36.5 °C)-98.6 °F (37 °C)] 98.1 °F (36.7 °C)  Heart Rate:  [] 98  Resp:  [14-18] 16  BP: ()/(71-97) 129/71    LABS:      Admission on 05/03/2023   Component Date Value Ref Range Status   • Hemoglobin 05/04/2023 10.7 (L)  12.0 - 15.9 g/dL Final   • Hematocrit 05/04/2023 32.7 (L)  34.0 - 46.6 % Final       No results found.    Discharge Medications     Discharge Medications      New Medications      Instructions Start Date   Aspirin Low Dose 81 MG EC tablet  Generic drug: aspirin   Take 1 tablet by mouth 2 times a day for 14 days, then take 1 tablet by mouth daily for 28 days.      HYDROcodone-acetaminophen 7.5-325 MG per tablet  Commonly known as: NORCO   1 tablet, Oral, Every 4 Hours PRN      pantoprazole 40 MG EC tablet  Commonly known as: PROTONIX   40 mg, Oral, Daily      polyethylene glycol 17 GM/SCOOP powder  Commonly known as: MIRALAX   Take 17 g by mouth 2 (Two) Times a Day for 7 days         Changes to Medications      Instructions Start  Date   meloxicam 15 MG tablet  Commonly known as: MOBIC  What changed:   · medication strength  · how much to take   Take 1 tablet by mouth Daily for 14 days         Continue These Medications      Instructions Start Date   albuterol sulfate  (90 Base) MCG/ACT inhaler  Commonly known as: ProAir HFA   1 puff, Inhalation, Every 4 Hours PRN      cetirizine 10 MG tablet  Commonly known as: zyrTEC   10 mg, Oral, Daily PRN      cyclobenzaprine 10 MG tablet  Commonly known as: FLEXERIL   10 mg, Oral, 3 Times Daily PRN      FLUoxetine 40 MG capsule  Commonly known as: PROzac   40 mg, Oral, Daily      fluticasone 50 MCG/ACT nasal spray  Commonly known as: FLONASE   2 sprays, Nasal, Daily PRN      Insulin Pen Needle 32G X 4 MM misc   Use daily with saxenda      ondansetron 4 MG tablet  Commonly known as: Zofran   4 mg, Oral, Every 8 Hours PRN      pseudoephedrine 30 MG tablet  Commonly known as: SUDAFED   60 mg, Oral, As Needed      Saxenda 18 MG/3ML injection pen  Generic drug: Liraglutide   3 mg, Subcutaneous, Daily      spironolactone-hydrochlorothiazide 25-25 MG tablet  Commonly known as: Aldactazide   1 tablet, Oral, Daily         Stop These Medications    Chlorhexidine Gluconate Cloth 2 % pads     VITAMIN B 12 PO            Discharge Instructions: Patient is to continue with physical therapy exercises daily and continue working with the physical therapist as ordered. Patient may weight bear as tolerated. Apply ice regularly. Patient may ice for long periods of time as long as ice is not directly on the skin. Patient instructed on frequent calf pumping exercises.  Patient also instructed on incentive spirometer during hospitalization and encouraged to continue to use at home regularly.    Dressing: The dressing is designed to be left in place until you return to the office in 2 weeks.  The suction unit should stop functioning at 7 days and the green light will switch to yellow.  At that point the suction unit and  tubing can be disconnected at the port closest to the dressing.  The suction unit and tubing may be discarded.  You may shower immediately upon return home, you will need to turn the pump off by depressing the orange button once and then you may disconnect the pump and tubing at the connection port.  After showering, shake off the excess water and reattach the tubing.  Restart the pump by depressing the orange button one time and you will notice the green light flashing again.  If the dressing becomes dislodged or saturated it should be changed. Please refer to the AMENA information sheet if you have any questions about the dressing.  If you have a home health nurse or therapist they can be contacted to assist with dressing change or repair. You may also call the AMENA dressing hotline for questions related to the dressing ([phone]). If there are still other problems or questions related to the dressing despite these measures then you can contact Holly at our office [phone].  If for some reason the AMENA dressing is removed, after 7 days the wound can be gently cleaned with antibacterial soap then allowed to dry and covered with a dry sterile dressing. The wound should be covered at all times except while showering.  Patient may change dressings daily and prn using sterile 4x4 and paper tape, and should call if any unusual drainage, redness or swelling.*  Follow up appointment in 2 weeks - patient to call the office at [phone] to schedule.  Patient will be discharged on aspirin 81mg BID x 2 weeks, then daily x 4 weeks    Discharge Diagnosis:    Primary osteoarthritis of left knee    OA (osteoarthritis) of knee      Follow-up Appointments  Future Appointments   Date Time Provider Department Center   5/18/2023 10:40 AM James Coy MD [facility] L[facility]   5/19/2023  9:45 AM Debby Long, PT [facility]   5/22/2023  8:15 AM Debby Long, PT [facility]   5/26/2023  9:00 AM Debby Long,  PT [facility]   5/30/2023  9:45 AM Long, Debby E, PT [facility]   6/2/2023  9:45 AM Long, Debby E, PT [facility]   6/6/2023  9:45 AM Long, Debby E, PT [facility]   6/9/2023  9:45 AM Long, Debby E, PT [facility]   6/12/2023  2:30 PM Chhaya Vallejo, MD [facility]   6/13/2023  9:45 AM Long, Debby E, PT [facility]   6/16/2023  9:45 AM Long, Debby E, PT [facility]   6/20/2023  9:45 AM Long, Debby E, PT [facility]   6/23/2023  9:45 AM Long, Debby E, PT [facility]   6/27/2023  9:45 AM Long, Debby E, PT [facility]   6/30/2023  9:45 AM Long, Debby E, PT [facility]   9/11/2023  7:30 AM Chhaya Vallejo, MD [facility]          Xiang Fitzgerald, APRN  05/04/23  08:29 EDT

## 2023-05-04 NOTE — PLAN OF CARE
Goal Outcome Evaluation:  Plan of Care Reviewed With: patient        Progress: improving  Outcome Evaluation: Pt seen for PT this AM prior to DC. Pt stood with mod I and ambulated 400ft with rwx and SV-mod I. Pt with improved heel strike and L knee flexion. Pt did 4 steps with SBA, cues for sequencing but demo'd safety/independence. Returned to room, completed TKA exercises. Pt educated about icing, elevation, and frequent bouts of short distance mobility. Pt safe to DC home today with HHPT and family to assist. PT will sign-off.

## 2023-05-04 NOTE — THERAPY DISCHARGE NOTE
Patient Name: Ce YEBOAH Bernardino  : 1970    MRN: [id number]                              Today's Date: 2023       Admit Date: 5/3/2023    Visit Dx:     ICD-10-CM ICD-9-CM   1. S/P TKR (total knee replacement), left  Z96.652 V43.65   2. Primary osteoarthritis of left knee  M17.12 715.16     Patient Active Problem List   Diagnosis   • Low back pain   • Depression   • Gastroesophageal reflux disease   • History of tobacco use   • Prediabetes   • Vitamin D deficiency   • Anxiety   • Recurrent major depressive disorder, in full remission   • Primary osteoarthritis of left knee   • OA (osteoarthritis) of knee     Past Medical History:   Diagnosis Date   • Allergic    • Anemia    • Ankle swelling    • Anxiety and depression    • Arthritis    • Back problem     LOW BACK PAIN, DISC ISSUES   • GERD (gastroesophageal reflux disease)    • Knee problem    • Left knee pain    • Low back pain    • Methicillin resistant Staph aureus culture positive     HX 10-15 YEARS AGO, NASAL, TREATED [facility]   • Obesity    • PONV (postoperative nausea and vomiting)    • Prediabetes    • S/P cervical spinal fusion    • Shortness of breath on exertion     FROM DECONDITIONING FROM KNEE PAIN   • SVT (supraventricular tachycardia)     HX, ANXIETY RELATED PER PT   • Tendinitis of right elbow      Past Surgical History:   Procedure Laterality Date   • ADENOIDECTOMY     • ANTERIOR CERVICAL DISCECTOMY W/ FUSION      C6-7   • APPENDECTOMY  2012   •  SECTION     • DIAGNOSTIC LAPAROSCOPY, SALPINGO OOPHORECTOMY LAPAROSCOPIC     • ENDOMETRIAL ABLATION  10/2012   • EPIDURAL BLOCK     • HYSTERECTOMY     • LAPAROSCOPIC CHOLECYSTECTOMY  2005   • LASIK     • OOPHORECTOMY     • TONSILLECTOMY     • VENTRAL HERNIA REPAIR  2016      General Information     Row Name 23 0911          Physical Therapy Time and Intention    Document Type therapy note (daily note);discharge treatment  -     Mode of  Treatment individual therapy;physical therapy  -     Row Name 05/04/23 0911          General Information    Patient Profile Reviewed yes  -     Existing Precautions/Restrictions fall  -     Row Name 05/04/23 0911          Cognition    Orientation Status (Cognition) oriented x 4  -[facility] Name 05/04/23 0911          Safety Issues, Functional Mobility    Impairments Affecting Function (Mobility) balance;endurance/activity tolerance;strength;pain;range of motion (ROM)  -           User Key  (r) = Recorded By, (t) = Taken By, (c) = Cosigned By    Initials Name Provider Type     Tessy Santos PT Physical Therapist               Mobility     Row Name 05/04/23 0911          Bed Mobility    Supine-Sit Mertens (Bed Mobility) not tested  -     Comment, (Bed Mobility) NT - U[facility]  -     Row Name 05/04/23 0911          Sit-Stand Transfer    Sit-Stand Mertens (Transfers) modified independence  -     Assistive Device (Sit-Stand Transfers) walker, front-wheeled  -[facility] Name 05/04/23 0911          Gait/Stairs (Locomotion)    Mertens Level (Gait) supervision;modified independence  -     Assistive Device (Gait) walker, front-wheeled  -     Distance in Feet (Gait) 400ft  -     Deviations/Abnormal Patterns (Gait) antalgic;gait speed decreased  -     Mertens Level (Stairs) stand by assist;supervision  -     Handrail Location (Stairs) right side (ascending);left side (descending)  -     Number of Steps (Stairs) 4  -     Ascending Technique (Stairs) step-to-step  -     Descending Technique (Stairs) step-to-step  -[facility] Name 05/04/23 0911          Mobility    Extremity Weight-bearing Status left lower extremity  -     Left Lower Extremity (Weight-bearing Status) weight-bearing as tolerated (WBAT)  -           User Key  (r) = Recorded By, (t) = Taken By, (c) = Cosigned By    Initials Name Provider Type     Tessy Santos PT Physical Therapist                Obj/Interventions     Row Name 05/04/23 0912          Motor Skills    Therapeutic Exercise --  10 reps TKA protocol  -           User Key  (r) = Recorded By, (t) = Taken By, (c) = Cosigned By    Initials Name Provider Type    Tessy Fair PT Physical Therapist               Goals/Plan    No documentation.                Clinical Impression     [facility] Name 05/04/23 0912          Pain    Pretreatment Pain Rating 3/10  [facility]     Posttreatment Pain Rating 3/10  [facility]     Pain Location - Side/Orientation Right  -     Pain Location incisional  -     Pain Location - knee  -     Pain Intervention(s) Repositioned;Ambulation/increased activity;Cold applied;Rest  -[facility] Name 05/04/23 0912          Plan of Care Review    Plan of Care Reviewed With patient  -     Progress improving  -     Outcome Evaluation Pt seen for PT this AM prior to DC. Pt stood with mod I and ambulated 400ft with rwx and SV-mod I. Pt with improved heel strike and L knee flexion. Pt did 4 steps with SBA, cues for sequencing but demo'd safety/independence. Returned to room, completed TKA exercises. Pt educated about icing, elevation, and frequent bouts of short distance mobility. Pt safe to DC home today with HHPT and family to assist. PT will sign-off.  -     Row Name 05/04/23 0912          Vital Signs    O2 Delivery Pre Treatment room air  -     O2 Delivery Intra Treatment room air  -     O2 Delivery Post Treatment room air  -[facility] Name 05/04/23 0912          Positioning and Restraints    Pre-Treatment Position sitting in chair/recliner  -     Post Treatment Position chair  -     In Chair reclined;call light within reach;encouraged to call for assist;exit alarm on  -           User Key  (r) = Recorded By, (t) = Taken By, (c) = Cosigned By    Initials Name Provider Type    Tessy Fair, PT Physical Therapist               Outcome Measures     Row Name 05/04/23 0914 05/04/23 0730       How much help from another  person do you currently need...    Turning from your back to your side while in flat bed without using bedrails? 4  - 4  -MN    Moving from lying on back to sitting on the side of a flat bed without bedrails? 4  - 4  -MN    Moving to and from a bed to a chair (including a wheelchair)? 4  - 4  -MN    Standing up from a chair using your arms (e.g., wheelchair, bedside chair)? 4  - 4  -MN    Climbing 3-5 steps with a railing? 4  - 3  -MN    To walk in hospital room? 4  - 4  -MN    AM-PAC 6 Clicks Score (PT) 24  - 23  -MN    Highest level of mobility 8 --> Walked 250 feet or more  - 7 --> Walked 25 feet or more  -MN    Row Name 05/04/23 0914          Functional Assessment    Outcome Measure Options AM-PAC 6 Clicks Basic Mobility (PT)  -           User Key  (r) = Recorded By, (t) = Taken By, (c) = Cosigned By    Initials Name Provider Type    MN Merced Gilbert, RN Registered Nurse     Tessy Santos, PT Physical Therapist              Physical Therapy Education     Title: PT OT SLP Therapies (Done)     Topic: Physical Therapy (Done)     Point: Mobility training (Done)     Learning Progress Summary           Patient Acceptance, E,TB,D, VU by  at 5/4/2023 0914    Acceptance, E,TB,D, VU,NR by  at 5/3/2023 1744                   Point: Home exercise program (Done)     Learning Progress Summary           Patient Acceptance, E,TB,D, VU by  at 5/4/2023 0914    Acceptance, E,TB,D, VU,NR by  at 5/3/2023 1744                   Point: Body mechanics (Done)     Learning Progress Summary           Patient Acceptance, E,TB,D, VU by  at 5/4/2023 0914    Acceptance, E,TB,D, VU,NR by  at 5/3/2023 1744                   Point: Precautions (Done)     Learning Progress Summary           Patient Acceptance, E,TB,D, VU by  at 5/4/2023 0914    Acceptance, E,TB,D, VU,NR by  at 5/3/2023 1744                               User Key     Initials Effective Dates Name Provider Type Discipline     04/08/22 -   Tessy Santos, PT Physical Therapist PT              PT Recommendation and Plan  Planned Therapy Interventions (PT): balance training, bed mobility training, gait training, home exercise program, patient/family education, strengthening, stair training, ROM (range of motion), transfer training  Plan of Care Reviewed With: patient  Progress: improving  Outcome Evaluation: Pt seen for PT this AM prior to DC. Pt stood with mod I and ambulated 400ft with rwx and SV-mod I. Pt with improved heel strike and L knee flexion. Pt did 4 steps with SBA, cues for sequencing but demo'd safety/independence. Returned to room, completed TKA exercises. Pt educated about icing, elevation, and frequent bouts of short distance mobility. Pt safe to DC home today with HHPT and family to assist. PT will sign-off.     Time Calculation:    PT Charges     Row Name 05/04/23 0915             Time Calculation    Start Time 0853  -      Stop Time 0908  -      Time Calculation (min) 15 min  -      PT Received On 05/04/23  -         Time Calculation- PT    Total Timed Code Minutes- PT 15 minute(s)  -         Timed Charges    00108 - Gait Training Minutes  8  -      01750 - PT Therapeutic Activity Minutes 7  -BH         Total Minutes    Timed Charges Total Minutes 15  -       Total Minutes 15  -            User Key  (r) = Recorded By, (t) = Taken By, (c) = Cosigned By    Initials Name Provider Type     Tessy Santos, PT Physical Therapist              Therapy Charges for Today     Code Description Service Date Service Provider Modifiers Qty    16965251627 HC GAIT TRAINING EA 15 MIN 5/3/2023 Tessy Santos, PT GP 1    [id number]  PT EVAL LOW COMPLEXITY 3 5/3/2023 Tessy Santos, PT GP 1    [id number] HC GAIT TRAINING EA 15 MIN 5/4/2023 Tessy Santos, PT GP 1          PT G-Codes  Outcome Measure Options: AM-PAC 6 Clicks Basic Mobility (PT)  AM-PAC 6 Clicks Score (PT): 24    PT Discharge Summary  Anticipated Discharge  Disposition (PT): home with assist, home with home health    Tessy Santos, PT  5/4/2023

## 2023-05-04 NOTE — DISCHARGE PLACEMENT REQUEST
"Freddy Mancilla (52 y.o. Female)     Date of Birth   1970    Social Security Number       Address   [address] [place] [address]    Home Phone   [phone]    MRN   [id number]       [facility]    Marital Status   Single                            Admission Date   5/3/23    Admission Type   Elective    Admitting Provider   James Coy MD    Attending Provider   James Coy MD    Department, Room/Bed   [address], P782/1       Discharge Date       Discharge Disposition   [place] [facility]    [place] Destination                               Attending Provider: James Coy MD    Allergies: Avocado, Latex, Sulfa Antibiotics, Adhesive Tape    Isolation: None   Infection: None   Code Status: Not on file    Ht: 161.3 cm (63.5\")   Wt: 111 kg (245 lb)    Admission Cmt: None   Principal Problem: Primary osteoarthritis of left knee [M17.12]                 Active Insurance as of 5/3/2023     Primary Coverage     Payor Plan Insurance Group Employer/Plan Group    [employer] [id number]     Payor Plan Address Payor Plan Phone Number Payor Plan Fax Number Effective Dates    [address] [phone]  4/1/2020 - None Entered    [facility] [id number]       Subscriber Name Subscriber Birth Date Member ID       FREDDY MANCILLA 1970 [id number]                 Emergency Contacts      (Rel.) Home Phone Work Phone Mobile Phone    BernardinoLaura payne (Mother) -- -- [phone]    BernardinoNelson payne (Son) -- -- [phone]    BernardinoGeoff payne (Father) -- -- [phone]    Ana Chanel (Sister) -- -- [phone]          "

## 2023-05-04 NOTE — PROGRESS NOTES
Patient is discharging today . Spoke to patient who is agreeable to home health and has no current home health . Face sheet information is correct and orders for home health PT in [facility]. Thank you !

## 2023-05-05 ENCOUNTER — TRANSITIONAL CARE MANAGEMENT TELEPHONE ENCOUNTER (OUTPATIENT)
Dept: CALL CENTER | Facility: HOSPITAL | Age: 53
End: 2023-05-05
Payer: COMMERCIAL

## 2023-05-05 ENCOUNTER — HOME CARE VISIT (OUTPATIENT)
Dept: HOME HEALTH SERVICES | Facility: HOME HEALTHCARE | Age: 53
End: 2023-05-05
Payer: COMMERCIAL

## 2023-05-05 VITALS
TEMPERATURE: 97.2 F | RESPIRATION RATE: 18 BRPM | DIASTOLIC BLOOD PRESSURE: 64 MMHG | SYSTOLIC BLOOD PRESSURE: 128 MMHG | OXYGEN SATURATION: 98 % | HEART RATE: 100 BPM

## 2023-05-05 PROCEDURE — G0151 HHCP-SERV OF PT,EA 15 MIN: HCPCS

## 2023-05-05 NOTE — Clinical Note
Patient admitted to [facility] PT services for 1 week 1, 3 week 1; 1 week 1 for gait training, fall prevention education; ther-ex instruction; pain/edema management following recent L TKA

## 2023-05-05 NOTE — OUTREACH NOTE
Call Center TCM Note    Flowsheet Row Responses   [facility] patient discharged from? [place]   Does the patient have one of the following disease processes/diagnoses(primary or secondary)? Total Joint Replacement   Joint surgery performed? Knee   TCM attempt successful? Yes   Call start time 1600   Call end time 1603   Has the patient been back in either the hospital or Emergency Department since discharge? No   Discharge diagnosis Total knee arthro   Does the patient have all medications related to this admission filled (includes all antibiotics, pain medications, etc.) Yes   Is the patient taking all medications as directed (includes completed medication regime)? Yes   Is the patient able to teach back alternate methods of pain control? Ice, Knee-elevation/no pillow under knee, Reposition, Correct alignment, Short, frequent activity   Does the patient have an appointment with their PCP within 7 days of discharge? No   Nursing Interventions Routed TCM call to PCP office   What is the Home health agency?  [facility]   Has home health visited the patient within 72 hours of discharge? Yes   Psychosocial issues? No   Has the patient began therapy sessions (either in the home or as an out patient)? Yes   Does the patient have a wound vac in place? No   Has the patient fallen since discharge? No   Did the patient receive a copy of their discharge instructions? Yes   Nursing interventions Reviewed instructions with patient   What is the patient's perception of their functional status since discharge? Improving   Is the patient able to teach back signs and symptoms of infection? Temp >100.4 for 24h or longer, Incisional drainage, Blisters around incision, Increased swelling or redness around incision (not associated with surgical edema), Severe discomfort or pain, Changes in mobility, Shortness of breath or chest pain   Is the patient able to teach back how to prevent infection? Check incision daily, Wash hands before  and after touching incision, Keep incision covered if drainage, Keep incision covered if pets in house, Shower only as directed by surgeon, Eat well-balanced diet   Is the patient able to teach back signs and symptoms of DVT? Redness in calf, Swelling in calf, Severe pain in calf, Area hot to touch, Shortness of breath or chest pain   Is the patient able to teach back home safety measures? Ability to shower, Accessibility to necessary areas in home, Modifications to reach items   If the patient is a current smoker, are they able to teach back resources for cessation? Not a smoker   Is the patient/caregiver able to teach back the hierarchy of who to call/visit for symptoms/problems? PCP, Specialist, Home health nurse, Urgent Care, ED, 911 Yes   TCM call completed? Yes   Call end time 2180          Cortney Ang LPN    5/5/2023, 16:12 EDT

## 2023-05-05 NOTE — OUTREACH NOTE
Call Center TCM Note    Flowsheet Row Responses   [facility] [place] facility patient discharged from? [place]   Does the patient have one of the following disease processes/diagnoses(primary or secondary)? Total Joint Replacement   Joint surgery performed? Knee   TCM attempt successful? No   Unsuccessful attempts Attempt 1          Cortney Ang LPN    5/5/2023, 10:46 EDT

## 2023-05-05 NOTE — HOME HEALTH
"REASON FOR REFERRAL: 52  year old  female  presents with complaints of : L knee pain, difficulty walking following recent L TKA       Home health ordered for: PT    SUBJECTIVE: \"I have been trying to use the (gait) belt to help move my leg because it hurts so badly right now.\" Patient states that she spent last night at her parents' home but has decided to return to own home because it is more comfortable for her       DIAGNOSIS/FOCUS OF CARE:  Left TKA Dr Coy 5/3/23       PERTINENT MEDICAL HISTORY:   Low back pain   Depression   Gastroesophageal reflux disease   History of tobacco use   Prediabetes   Vitamin D deficiency   Anxiety   Recurrent major depressive disorder, in full remission   Primary osteoarthritis of left knee   OA (osteoarthritis) of knee      PRIOR LEVEL OF FUNCTION: Independent all mobility and self care; drove; 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      PATIENT PHYSICAL THERAPY GOAL(S): walk without pain; 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      SOCIAL ENVIRONMENT/ DME /POTENTIAL BARRIERS FOR GOAL ATTAINMENT : RW, cane, cryocuff, 3 in 1 commode; tub shower; 2 steps to enter home with unilateral rail; 13 steps with unilateral rail to bedroom; lives alone; parents live next door and assist as needed;has large dog that parents are temporarily keeping     SKIN INTEGRITY/ WOUND STATUS:  see wound care screen for details- L anterior knee incision nonobservable due to AMENA    CODE STATUS:  full    MEDICATION ISSUES/ CONCERNS:  none identified    HOMEBOUND STATUS: yes - see homebound screen for details    PROBLEMS IDENTIFIED: see care plan    FUNCTIONAL STATUS/ FALL RISK/ SAFETY: see physical therapy evaluation/ care plan      ASSESSMENT: antalgic gait pattern.  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       PLAN FOR NEXT VISIT:   MEDICAL NECESSITY FOR ONGOING SKILLED THERAPY:  Skilled physical therapy " is medically necessary for treatment of: pain, ROM deficits, weakness, gait deficits, and transfer deficits following recent L TKA.  .Requires instruction in appropriate progression of exercises; education in fall prevention/pain/edema management; gait training to reduce reliance on assistive device; balance retraining to prevent falls.  Without skilled physical therapy, patient at risk for: falls, rehospitalization, increased reliance on caregiver, chronic pain,       SPECIFIC INTERVENTIONS AND GOALS TO ADDRESS ON NEXT VISIT:  - progress HEP to include: standing exercises when appropriate  - gait training to restore normal mechanics  - fall prevention education  - continued home safety education  - increase AROM greater than   38-72 degrees  L knee  - transfer training    FREQUENCY AND DURATION:  - 1 week1; 3 week 1; 1 week 1    ANY OTHER FOLLOW UP NEEDED: none    REASSESSMENT DUE DATE: 30 day: 6/5/23    DATE OF NEXT APPOINTMENT WITH DOCTOR:  5/18/23 Dr Coy; 5/19/23 outpatient PT to begin      Dr. Coy electronically notified of POC via case communication to clinical pool on 5/5/23

## 2023-05-08 ENCOUNTER — HOME CARE VISIT (OUTPATIENT)
Dept: HOME HEALTH SERVICES | Facility: HOME HEALTHCARE | Age: 53
End: 2023-05-08
Payer: COMMERCIAL

## 2023-05-08 PROCEDURE — G0151 HHCP-SERV OF PT,EA 15 MIN: HCPCS

## 2023-05-08 NOTE — HOME HEALTH
"SUBJECTIVE: patient states that she has been compliant with HEP.  had \" a couple of rough days\" but is feeling better today      No new med changes.  No recent Falls.      ASSESSMENT  marked improvement in knee ROM this visit.  beginning to transition away from need for B UEs to assist with ambulation      SKILL/EDUCATION PROVIDED: see interventions for details  PATIENT/CAREGIVER RESPONSE: see interventions for details     ____________  REASON FOR REFERRAL: 52 year old female presents with complaints of : L knee pain, difficulty walking following recent L TKA   Home health ordered for: PT   SUBJECTIVE: \"I have been trying to use the (gait) belt to help move my leg because it hurts so badly right now.\" Patient states that she spent last night at her parents' home but has decided to return to own home because it is more comfortable for her   DIAGNOSIS/FOCUS OF CARE: Left TKA Dr Coy 5/3/23   PERTINENT MEDICAL HISTORY:   Low back pain   Depression   Gastroesophageal reflux disease   History of tobacco use   Prediabetes   Vitamin D deficiency   Anxiety   Recurrent major depressive disorder, in full remission   Primary osteoarthritis of left knee   OA (osteoarthritis) of knee       ______________________________    PLAN FOR NEXT VISIT:   MEDICAL NECESSITY FOR ONGOING SKILLED THERAPY: Skilled physical therapy is medically necessary for treatment of: pain, ROM deficits, weakness, gait deficits, and transfer deficits following recent L TKA. .Requires instruction in appropriate progression of exercises; education in fall prevention/pain/edema management; gait training to reduce reliance on assistive device; balance retraining to prevent falls. Without skilled physical therapy, patient at risk for: falls, rehospitalization, increased reliance on caregiver, chronic pain,     SPECIFIC INTERVENTIONS AND GOALS TO ADDRESS ON NEXT VISIT:   - progress HEP to include: standing exercises when appropriate   - gait training to restore " normal mechanics   - fall prevention education   - continued home safety education   - increase AROM greater than  degrees L knee   - transfer training     FREQUENCY AND DURATION:   - 1 week1; 3 week 1; 1 week 1     ANY OTHER FOLLOW UP NEEDED: none   REASSESSMENT DUE DATE: 30 day: 6/5/23     DATE OF NEXT APPOINTMENT WITH DOCTOR: 5/18/23 Dr Coy; 5/19/23 outpatient PT to begin

## 2023-05-10 ENCOUNTER — HOME CARE VISIT (OUTPATIENT)
Dept: HOME HEALTH SERVICES | Facility: HOME HEALTHCARE | Age: 53
End: 2023-05-10
Payer: COMMERCIAL

## 2023-05-10 ENCOUNTER — TELEPHONE (OUTPATIENT)
Dept: ORTHOPEDIC SURGERY | Facility: HOSPITAL | Age: 53
End: 2023-05-10
Payer: COMMERCIAL

## 2023-05-10 VITALS
DIASTOLIC BLOOD PRESSURE: 78 MMHG | TEMPERATURE: 97.5 F | HEART RATE: 97 BPM | SYSTOLIC BLOOD PRESSURE: 118 MMHG | OXYGEN SATURATION: 99 % | RESPIRATION RATE: 18 BRPM

## 2023-05-10 PROCEDURE — G0151 HHCP-SERV OF PT,EA 15 MIN: HCPCS

## 2023-05-10 NOTE — HOME HEALTH
"SUBJECTIVE: \" I slept in the bed 2 nights ago. It wasn't so great. I forgot the gait belt upstairs so I have had to move my leg around more. I feel like I let the pain get too far ahead of me Monday night so yesterday wasn't as good of a day\"  No new med changes.   No recent Falls.     ASSESSMENT marked improvement in knee ROM this visit. beginning to transition away from need for B UEs to assist with ambulation   SKILL/EDUCATION PROVIDED: see interventions for details   PATIENT/CAREGIVER RESPONSE: see interventions for details   ______________________________   PLAN FOR NEXT VISIT:   MEDICAL NECESSITY FOR ONGOING SKILLED THERAPY: Skilled physical therapy is medically necessary for treatment of: pain, ROM deficits, weakness, gait deficits, and transfer deficits following recent L TKA. .Requires instruction in appropriate progression of exercises; education in fall prevention/pain/edema management; gait training to reduce reliance on assistive device; balance retraining to prevent falls. Without skilled physical therapy, patient at risk for: falls, rehospitalization, increased reliance on caregiver, chronic pain,     SPECIFIC INTERVENTIONS AND GOALS TO ADDRESS ON NEXT VISIT:   - progress HEP to include: standing exercises   - gait training to restore normal mechanics   - increase AROM greater than  degrees L knee     FREQUENCY AND DURATION:   - 1 week1; 3 week 1; 1 week 1     ANY OTHER FOLLOW UP NEEDED: none   REASSESSMENT DUE DATE: 30 day: 6/5/23   DATE OF NEXT APPOINTMENT WITH DOCTOR: 5/18/23 Dr Coy; 5/19/23 outpatient PT to begin"

## 2023-05-12 ENCOUNTER — HOME CARE VISIT (OUTPATIENT)
Dept: HOME HEALTH SERVICES | Facility: HOME HEALTHCARE | Age: 53
End: 2023-05-12
Payer: COMMERCIAL

## 2023-05-12 VITALS
TEMPERATURE: 97.9 F | HEART RATE: 78 BPM | OXYGEN SATURATION: 98 % | RESPIRATION RATE: 18 BRPM | DIASTOLIC BLOOD PRESSURE: 83 MMHG | SYSTOLIC BLOOD PRESSURE: 148 MMHG

## 2023-05-12 PROCEDURE — G0157 HHC PT ASSISTANT EA 15: HCPCS

## 2023-05-12 NOTE — HOME HEALTH
Patient has progressed to cane use throughout the home and reports having no issues. Good adherence to HEP reported. Review for DC planning and progression to OP. Pain levels are well managed at this time.     SIGNS/SYMPTOMS OF INFECTION: No    FALLS SINCE LAST VISIT: No    EDEMA: Moderate non-pitting. Calf soft and non-tender    WOUNDS: Dressing intact, no new drainage.    PLAN FOR NEXT VISIT:  Review/finalize home exercise program  Review fall risk and safety strategies  Gait training with LRAD  Improve ROM

## 2023-05-12 NOTE — OUTREACH NOTE
Bed: 43  Expected date: 5/12/23  Expected time: 1:27 PM  Means of arrival:   Comments:  +HI Pt from waiting room    Prep Survey    Flowsheet Row Responses   [facility] facility patient discharged from? [place]   Is LACE score < 7 ? Yes   Eligibility [facility]   Date of Admission 05/03/23   Date of Discharge 05/04/23   Discharge Disposition Home-Health Care Sv   Discharge diagnosis Total knee arthro   Does the patient have one of the following disease processes/diagnoses(primary or secondary)? Total Joint Replacement   Does the patient have Home health ordered? Yes   What is the Home health agency?  [facility]   Is there a DME ordered? No   Prep survey completed? Yes          DA RIVERA - Registered Nurse

## 2023-05-16 ENCOUNTER — HOME CARE VISIT (OUTPATIENT)
Dept: HOME HEALTH SERVICES | Facility: HOME HEALTHCARE | Age: 53
End: 2023-05-16
Payer: COMMERCIAL

## 2023-05-16 VITALS
OXYGEN SATURATION: 97 % | DIASTOLIC BLOOD PRESSURE: 86 MMHG | SYSTOLIC BLOOD PRESSURE: 126 MMHG | RESPIRATION RATE: 18 BRPM | TEMPERATURE: 97.1 F | HEART RATE: 88 BPM

## 2023-05-16 PROCEDURE — G0151 HHCP-SERV OF PT,EA 15 MIN: HCPCS

## 2023-05-16 NOTE — HOME HEALTH
"SUBJECTIVE: \" I think I overdid it the past couple of days\"  Patient reports that she has been compliant with HEP and has been using SPC for ambulation  No new med changes.   No recent Falls.   ASSESSMENT continued improvement in knee ROM this visit.pain well managed    SKILL/EDUCATION PROVIDED: see interventions for details   PATIENT/CAREGIVER RESPONSE: see interventions for details   ______________________________     DATE OF NEXT APPOINTMENT WITH DOCTOR: 5/18/23 Dr Coy; 5/19/23 outpatient PT to begin"

## 2023-05-16 NOTE — Clinical Note
Patient discharged from home health PT due to no longer homebound. Plans to transition to outpatient PT on 5/19/23

## 2023-05-18 ENCOUNTER — OFFICE VISIT (OUTPATIENT)
Dept: ORTHOPEDIC SURGERY | Facility: CLINIC | Age: 53
End: 2023-05-18
Payer: COMMERCIAL

## 2023-05-18 VITALS — HEIGHT: 63 IN | BODY MASS INDEX: 42.91 KG/M2 | WEIGHT: 242.2 LBS | TEMPERATURE: 97.8 F

## 2023-05-18 DIAGNOSIS — Z96.652 S/P TKR (TOTAL KNEE REPLACEMENT), LEFT: ICD-10-CM

## 2023-05-18 PROCEDURE — 99024 POSTOP FOLLOW-UP VISIT: CPT | Performed by: ORTHOPAEDIC SURGERY

## 2023-05-18 RX ORDER — HYDROCODONE BITARTRATE AND ACETAMINOPHEN 7.5; 325 MG/1; MG/1
1 TABLET ORAL EVERY 4 HOURS PRN
Qty: 20 TABLET | Refills: 0 | Status: SHIPPED | OUTPATIENT
Start: 2023-05-18

## 2023-05-18 NOTE — PROGRESS NOTES
Ce YEBOAH [facility] : 1970 MRN: [id number] DATE: 2023    DIAGNOSIS: 2 week follow up left total knee      SUBJECTIVE:Patient returns today for 2 week follow up of left total knee replacement. Patient reports doing well with no unusual complaints. Appears to be progressing appropriately.    OBJECTIVE:   Exam:. The incision is healing appropriately. No sign of infection. Range of motion is progressing as expected. The calf is soft and nontender with a negative Homans sign.    ASSESSMENT: 2 week status post left knee replacement.    PLAN: 1) Staples removed and steri strips applied   2) Order given for PT   3) Discontinue LIU hose   4) Continue ice PRN   5) aspirin 81 mg orally every day for 1 month   6) Follow up in 6 weeks with repeat Xrays of left knee (3views)    James Coy MD  2023

## 2023-05-19 ENCOUNTER — TREATMENT (OUTPATIENT)
Dept: PHYSICAL THERAPY | Facility: CLINIC | Age: 53
End: 2023-05-19
Payer: COMMERCIAL

## 2023-05-19 DIAGNOSIS — M25.562 ACUTE PAIN OF LEFT KNEE: ICD-10-CM

## 2023-05-19 DIAGNOSIS — Z96.652 S/P TOTAL KNEE ARTHROPLASTY, LEFT: Primary | ICD-10-CM

## 2023-05-19 DIAGNOSIS — R26.9 GAIT DISTURBANCE: ICD-10-CM

## 2023-05-19 PROCEDURE — 97162 PT EVAL MOD COMPLEX 30 MIN: CPT | Performed by: PHYSICAL THERAPIST

## 2023-05-19 PROCEDURE — 97110 THERAPEUTIC EXERCISES: CPT | Performed by: PHYSICAL THERAPIST

## 2023-05-19 NOTE — PROGRESS NOTES
Physical Therapy Initial Evaluation and Plan of Care      Patient: Ce YEBOAH [facility]   : 1970  Diagnosis/ICD-10 Code:  S/P total knee arthroplasty, left [Z96.652]  Referring practitioner: NICK Osborn  Date of Initial Visit: 2023  Today's Date: 2023  Patient seen for 1 sessions    [facility] [place] [address]  [phone] (phone)  [phone] (fax)         Subjective Evaluation    History of Present Illness  Date of surgery: 5/3/2023  Mechanism of injury: eC is s/p L TKA. She is now using the SC for ambulation. She didn't use the walker for very long. She was having pain for about 8 years prior to this surgery, trying all of the injections, visco, things started to not help. She was going to have surgery in Feb, but mother had some health issues and had to put it off. She lives next door to her parents. Pain is pretty well controlled, taking meds at night. She is back to driving. She has a Palette bike at home (something MD is trying out with his patients).    PMHx: cervical fusion , LBP, R ankle fx (as a kid, no surgery), oophorectomy, appendentomy, total hysterectomy, hernia repair, R knee OA    Has one son, 24 years old            Patient Occupation: cardiac nurse at [employer] Quality of life: good    Pain  Current pain ratin  At worst pain rating: 3  Location: L knee  Quality: dull ache and tight (get twinges of pain on either side of the joint)  Relieving factors: medications and ice  Aggravating factors: stairs, squatting, ambulation, prolonged positioning and sleeping    Social Support  Lives in: multiple-level home (2 story and a basement, laundry on 2nd floor)  Lives with: alone    Treatments  Previous treatment: physical therapy and injection treatment  Discharged from (in last 30 days): home health care  Patient Goals  Patient goals for therapy: decreased pain, increased motion, increased  strength, return to work and return to sport/leisure activities  Patient goal: taekwondo           Objective          Static Posture     Comments  R knee varus deformity    Observations   Left Knee   Positive for edema and incision (healing well, clear adhesive strip over incision).       Active Range of Motion   Left Knee   Flexion: 105 (110 deg after exercises) degrees   Extension: 6 degrees   Extensor lag: 10 degrees     Right Knee   Flexion: 130 degrees   Extension: 4 degrees     Strength/Myotome Testing     Left Hip   Planes of Motion   Flexion: 4  Abduction: 4  Adduction: 4  External rotation: 4    Right Hip   Planes of Motion   Flexion: 4+  Abduction: 4+  Adduction: 5  External rotation: 4+    Left Knee   Flexion: 4  Extension: 4    Right Knee   Flexion: 5  Extension: 5    Left Knee Flexibility Comments:   Moderate tightness of HS and calf muscles    Right Knee Flexibility Comments:   Moderate tightness of HS and calf muscles    Ambulation   Weight-Bearing Status   Assistive device used: single point cane    Observational Gait   Gait: antalgic   Decreased left stance time.         Exercise:  -quad set, 10x 5 sec  -SLR x10 (cues to tighten quad prior to lifting leg)  -heel slides w/strap, 10x 10 sec  -seated HS and calf stretch 3x20 sec     Continue with icing. Try taking Tylenol prior to visit to help with pain, already takes Mobic daily    Functional Outcome Score:   Knee Outcome Survey=44/80 or 55%        Assessment & Plan     Assessment  Impairments: abnormal gait, abnormal or restricted ROM, activity intolerance, impaired physical strength, lacks appropriate home exercise program and pain with function  Functional Limitations: sleeping, walking, uncomfortable because of pain, sitting, standing, stooping and unable to perform repetitive tasks  Assessment details: Ce Lebron is a 52 y.o. year-old female referred to physical therapy for s/p L TKA. She presents with a evolving clinical presentation.  She  has comorbidities recent surgery, end stage OA of R knee,  and no personal factors that may affect her progress in the plan of care.  Pt ambulates with a SC for all distances with mild/mod antalgia. Pt has decreased L knee ROM in ext=6 deg and flex=110 deg, decreased strength 4/5, and decreased flexibility of the hamstrings and gastroc/soleus complex. Her incision is healing well with mild warmth and edema.  Pt would benefit from therapy to help improve her ability to walk without AD, perform stairs with greater ease, and to RTW.    Prognosis: good    Goals  Plan Goals: ST wks  1. Patient will be independent with education for symptom management, joint protection and strategies to minimize stress on affected tissues  2. Patient will ambulate without AD for all distances with very minimal gait issues  3. Pt to improve L knee ROM to ext=3 deg and xepcncm=498 deg for improved gait pattern and ease of sitting    LT wks  1. Pt to improve L knee ROM to ext=0 deg and srklrat=348 deg for improved gait pattern  2. Pt to improve score on Knee Outcome Survey from 55% to 75% for overall functional improvement  3. Patient will increase L knee strength to 5/5 to improve functional mobility  4. Patient will negotiate stairs reciprocally with rail support as needed for ease of getting up to her bedroom at home  5. Patient will demonstrate an independent HEP for core and knee strength and flexibility/ROM.        Plan  Therapy options: will be seen for skilled therapy services  Planned modality interventions: cryotherapy, ultrasound, TENS, thermotherapy (hydrocollator packs), dry needling and hydrotherapy  Other planned modality interventions: aquatic therapy  Planned therapy interventions: ADL retraining, balance/weight-bearing training, flexibility, functional ROM exercises, gait training, home exercise program, IADL retraining, joint mobilization, manual therapy, motor coordination training, neuromuscular re-education,  soft tissue mobilization, strengthening, stretching, therapeutic activities, transfer training, abdominal trunk stabilization and body mechanics training  Frequency: 2x week  Duration in weeks: 12  Treatment plan discussed with: patient        Timed:  Manual Therapy:         mins  56774;  Therapeutic Exercise:    12     mins  66568;     Neuromuscular Terry:        mins  56050;    Therapeutic Activity:          mins  53429;     Gait Training:           mins  34387;     Ultrasound:          mins  72659;    Iontophoresis         mins 11998        Untimed:  Electrical Stimulation:         mins  66846 ( );  Traction:       mins  30746;   Dry Needling   (1-2 muscles)             mins 20560 (Self-pay)  Dry Needling (3-4 muscles)           mins 20561 (Self-pay)  Dry Needling Trial              mins DRYNDLTRIAL  (No Charge)    Low Eval          Mins  69566  Mod Eval     30     Mins  15310  High Eval                            Mins  77919    Timed Treatment:   12   mins   Total Treatment:     42   mins    PT SIGNATURE: Debby Long PT, CDNT    License Number: [id number]    Electronically signed by Debby Long PT, 05/19/23, 9:33 AM EDT    DATE TREATMENT INITIATED: 5/19/2023    Initial Certification  Certification Period: 8/17/2023  I certify that the therapy services are furnished while this patient is under my care.  The services outlined above are required by this patient, and will be reviewed every 90 days.     PHYSICIAN: Xiang Fitzgerald APRN   NPI: [id number]                                         DATE:     Please sign and return via fax to [phone] Thank you, [facility].

## 2023-05-22 ENCOUNTER — TREATMENT (OUTPATIENT)
Dept: PHYSICAL THERAPY | Facility: CLINIC | Age: 53
End: 2023-05-22
Payer: COMMERCIAL

## 2023-05-22 DIAGNOSIS — M25.562 ACUTE PAIN OF LEFT KNEE: ICD-10-CM

## 2023-05-22 DIAGNOSIS — R26.9 GAIT DISTURBANCE: ICD-10-CM

## 2023-05-22 DIAGNOSIS — Z96.652 S/P TOTAL KNEE ARTHROPLASTY, LEFT: Primary | ICD-10-CM

## 2023-05-22 PROCEDURE — 97530 THERAPEUTIC ACTIVITIES: CPT | Performed by: PHYSICAL THERAPIST

## 2023-05-22 PROCEDURE — 97110 THERAPEUTIC EXERCISES: CPT | Performed by: PHYSICAL THERAPIST

## 2023-05-22 NOTE — PROGRESS NOTES
Physical Therapy Daily Treatment Note    Patient: Ce YEBOAH [facility]   : 1970  Diagnosis/ICD-10 Code:  S/P total knee arthroplasty, left [Z96.652]  Referring practitioner: NICK Osborn  Date of Initial Visit: Type: THERAPY  Noted: 2023  Today's Date: 2023  Patient seen for 2 sessions    [facility] [place] [address]  [phone] (phone)  [phone] (fax)         Subjective it is stiff this am    Objective       Exercise:  -NuStep, seat 7, level 4, x7 min for ROM  -flexion stretch (in stretch cage) 4x20 sec  -2 inch step ups, x20  -Monse leg press, 70lb x20, single leg 70lb x20  -seated hamstring curls, red band x20 (given band for home)  -LAQ and SAQ x20 each with 3 sec hold  -quad set, 10x 5 sec  DEFER  -SLR x10 (cues to tighten quad prior to lifting leg)  -heel slides w/strap, 10x 10 sec  DEFER  -seated HS and calf stretch 3x20 sec     Assessment/Plan  Pt is exhibiting an improved contraction of her quad with SLR. L knee flexion in sitting 110 deg after exercises. Able to step up a 2 inch step without pain with single hand hold for balance.          Timed:    Manual Therapy:         mins  10059;  Therapeutic Exercise:    35     mins  55245;     Neuromuscular Terry:        mins  18860;    Therapeutic Activity:     8     mins  90043;     Gait Training:           mins  87152;     Ultrasound:          mins  04928;    Electrical Stimulation:         mins  89430 ( );  Iontophoresis         mins 59818;  Aquatic Therapy         mins 20723;      Untimed:  Electrical Stimulation:         mins  55131 ( );  Traction:         mins  26065;   Dry Needling   (1-2 muscles)             mins  (Self-pay)  Dry Needling (3-4 muscles)           mins  (Self-pay)  Dry Needling Trial              mins DRYNDLTRIAL  (No Charge)    Timed Treatment:   43   mins   Total Treatment:     43   mins    Debby Long, PT, CDNT  Physical  Therapist    [place] License:[id number]

## 2023-05-26 ENCOUNTER — TREATMENT (OUTPATIENT)
Dept: PHYSICAL THERAPY | Facility: CLINIC | Age: 53
End: 2023-05-26
Payer: COMMERCIAL

## 2023-05-26 DIAGNOSIS — R26.9 GAIT DISTURBANCE: ICD-10-CM

## 2023-05-26 DIAGNOSIS — M25.562 ACUTE PAIN OF LEFT KNEE: ICD-10-CM

## 2023-05-26 DIAGNOSIS — Z96.652 S/P TOTAL KNEE ARTHROPLASTY, LEFT: Primary | ICD-10-CM

## 2023-05-26 PROCEDURE — 97530 THERAPEUTIC ACTIVITIES: CPT | Performed by: PHYSICAL THERAPIST

## 2023-05-26 PROCEDURE — 97110 THERAPEUTIC EXERCISES: CPT | Performed by: PHYSICAL THERAPIST

## 2023-05-26 NOTE — PROGRESS NOTES
Physical Therapy Daily Treatment Note    Patient: Ce YEBOAH [facility]   : 1970  Diagnosis/ICD-10 Code:  S/P total knee arthroplasty, left [Z96.652]  Referring practitioner: NICK Osborn  Date of Initial Visit: Type: THERAPY  Noted: 2023  Today's Date: 2023  Patient seen for 3 sessions    [facility] [place] [address]  [phone] (phone)  [phone] (fax)         Subjective doing ok, using the SC less now    Objective          Active Range of Motion   Left Knee   Flexion: 112 degrees     Passive Range of Motion   Left Knee   Flexion: 118 degrees         Exercise:  -NuStep, seat 7, level 5, x6 min for ROM  -flexion stretch (in stretch cage) 4x20 sec  -4 inch step ups, x20  -[place] leg press, 70lb x20, single leg 70lb x20  -seated hamstring curls, red band x20   -LAQ and SAQ x20 each with 3 sec hold  -quad set, 10x 5 sec    -SLR x15 (cues to tighten quad prior to lifting leg)  -heel slides w/strap, 10x 10 sec  DEFER  -seated HS and calf stretch 3x20 sec    -manual stretching into flexion, 3x20 sec      Assessment/Plan  Ce is progressing well with her strength, ROM, and gait. Her incision is healing very well. She is using the SC less, more for longer distances and in the community with minimal gait deficits.          Timed:    Manual Therapy:         mins  09817;  Therapeutic Exercise:    35     mins  10519;     Neuromuscular Terry:        mins  79768;    Therapeutic Activity:     8     mins  97679;     Gait Training:           mins  21856;     Ultrasound:          mins  70716;    Electrical Stimulation:         mins  84027 ( );  Iontophoresis         mins 23179;  Aquatic Therapy        mins 56161;      Untimed:  Electrical Stimulation:         mins  43695 ( );  Traction:         mins  01628;   Dry Needling   (1-2 muscles)             mins  (Self-pay)  Dry Needling (3-4 muscles)           mins   (Self-pay)  Dry Needling Trial              mins DRYNDLTRIAL  (No Charge)    Timed Treatment:   43   mins   Total Treatment:     43   mins    Debby Long PT, CDNT  Physical Therapist    [place] License:[id number]

## 2023-05-30 ENCOUNTER — TREATMENT (OUTPATIENT)
Dept: PHYSICAL THERAPY | Facility: CLINIC | Age: 53
End: 2023-05-30

## 2023-05-30 DIAGNOSIS — Z96.652 S/P TOTAL KNEE ARTHROPLASTY, LEFT: Primary | ICD-10-CM

## 2023-05-30 DIAGNOSIS — M25.562 ACUTE PAIN OF LEFT KNEE: ICD-10-CM

## 2023-05-30 DIAGNOSIS — R26.9 GAIT DISTURBANCE: ICD-10-CM

## 2023-05-30 PROCEDURE — 97530 THERAPEUTIC ACTIVITIES: CPT | Performed by: PHYSICAL THERAPIST

## 2023-05-30 PROCEDURE — 97110 THERAPEUTIC EXERCISES: CPT | Performed by: PHYSICAL THERAPIST

## 2023-05-30 NOTE — PROGRESS NOTES
Physical Therapy Daily Treatment Note    Patient: Ce YEBOAH [facility]   : 1970  Diagnosis/ICD-10 Code:  S/P total knee arthroplasty, left [Z96.652]  Referring practitioner: NICK Osborn  Date of Initial Visit: Type: THERAPY  Noted: 2023  Today's Date: 2023  Patient seen for 4 sessions    [facility] [place] [address]  [phone] (phone)  [phone] (fax)         Subjective I had hurt my back in  and had epidural injections. I have been having some pain in the R buttock and in the lateral lower leg.     Objective     Exercise:  -NuStep, seat 7, level 5, x6 min for ROM  -flexion stretch (in stretch cage) 4x20 sec  -4 inch step ups, x20  -2 inch step down (on phone book), x20  -[place] leg press, 70lb x20, single leg 70lb x20  -seated hamstring curls, red band x20   -LAQ and SAQ x20 each with 3 sec hold  -quad set, 10x 5 sec    -SLR x15 (cues to tighten quad prior to lifting leg)  -heel slides w/strap, 10x 10 sec  DEFER  -seated HS and calf stretch 3x20 sec    -manual stretching into flexion, 3x20 sec    Stretching on R LE as well, HS, calf, hip rotators, take time to elevate and rest more  Scar massage     Assessment/Plan  Ce has been starting to have some radiating pain in to her R buttocks and lower leg. She had issues with her back in , getting epidural injections that helped her pain. She has been pretty active, power washed a couple of deck chairs yesterday before she got tired. Suggested to back off of her activities as she is 4 weeks post op.          Timed:    Manual Therapy:         mins  02645;  Therapeutic Exercise:    33     mins  63250;     Neuromuscular Terry:        mins  28581;    Therapeutic Activity:     10     mins  14003;     Gait Training:           mins  22506;     Ultrasound:          mins  76974;    Electrical Stimulation:         mins  58233 ( );  Iontophoresis         mins 35402;  Aquatic  Therapy         mins 19831;      Untimed:  Electrical Stimulation:         mins  68303 ( );  Traction:         mins  76987;   Dry Needling   (1-2 muscles)             mins 20560 (Self-pay)  Dry Needling (3-4 muscles)           mins 20561 (Self-pay)  Dry Needling Trial              mins DRYNDLTRIAL  (No Charge)    Timed Treatment:   43   mins   Total Treatment:     43   mins    Debby Long PT, CDNT  Physical Therapist    [place] License:[id number]

## 2023-06-02 ENCOUNTER — TREATMENT (OUTPATIENT)
Dept: PHYSICAL THERAPY | Facility: CLINIC | Age: 53
End: 2023-06-02

## 2023-06-02 DIAGNOSIS — R26.9 GAIT DISTURBANCE: ICD-10-CM

## 2023-06-02 DIAGNOSIS — M25.562 ACUTE PAIN OF LEFT KNEE: ICD-10-CM

## 2023-06-02 DIAGNOSIS — Z96.652 S/P TOTAL KNEE ARTHROPLASTY, LEFT: Primary | ICD-10-CM

## 2023-06-02 PROCEDURE — 97110 THERAPEUTIC EXERCISES: CPT | Performed by: PHYSICAL THERAPIST

## 2023-06-02 PROCEDURE — 97530 THERAPEUTIC ACTIVITIES: CPT | Performed by: PHYSICAL THERAPIST

## 2023-06-02 NOTE — PROGRESS NOTES
Physical Therapy Daily Treatment Note    Patient: Ce YEBOAH [facility]   : 1970  Diagnosis/ICD-10 Code:  S/P total knee arthroplasty, left [Z96.652]  Referring practitioner: NICK Osborn  Date of Initial Visit: Type: THERAPY  Noted: 2023  Today's Date: 2023  Patient seen for 5 sessions    [facility] [place] [address]  [phone] (phone)  [phone] (fax)         Subjective been taking easier for the last few days.     Objective          Active Range of Motion   Left Knee   Flexion: 115 degrees   Extension: 5 degrees     Passive Range of Motion   Left Knee   Flexion: 121 degrees         Exercise:  -NuStep, seat 7, level 5, x6 min for ROM  -flexion stretch (in stretch cage) 4x20 sec  -5 inch step ups, x20  -2 inch step down (on phone book), x20  -[place] leg press, 70lb x20, single leg 70lb x20  -seated hamstring curls, red band x20   -LAQ and SAQ x20 each with 3 sec hold  -quad set, 10x 5 sec    -SLR x15 (cues to tighten quad prior to lifting leg)  -seated HS and calf stretch 3x20 sec    -manual stretching into flexion, 3x20 sec      Assessment/Plan  Ce is rarely using her SC now out in the community with near normal gait pattern. She has improved her ROM in flexion A/P to 115/121 deg. Still some pain with a 2 inch step down, but exhibiting improved quad control.          Timed:    Manual Therapy:         mins  19875;  Therapeutic Exercise:    35     mins  10730;     Neuromuscular Terry:        mins  89975;    Therapeutic Activity:     8     mins  03138;     Gait Training:           mins  99534;     Ultrasound:          mins  62691;    Electrical Stimulation:         mins  69605 ( );  Iontophoresis         mins 80751;  Aquatic Therapy         mins 34911;      Untimed:  Electrical Stimulation:         mins  04351 ( );  Traction:         mins  37500;   Dry Needling   (1-2 muscles)             mins 83106 (Self-pay)  Dry  Needling (3-4 muscles)           mins 20561 (Self-pay)  Dry Needling Trial              mins DRYNDLTRIAL  (No Charge)    Timed Treatment:   43   mins   Total Treatment:     43   mins    Debby Long PT, CDNT  Physical Therapist    [place] License:[id number]

## 2023-06-06 ENCOUNTER — TREATMENT (OUTPATIENT)
Dept: PHYSICAL THERAPY | Facility: CLINIC | Age: 53
End: 2023-06-06
Payer: COMMERCIAL

## 2023-06-06 DIAGNOSIS — R26.9 GAIT DISTURBANCE: ICD-10-CM

## 2023-06-06 DIAGNOSIS — Z96.652 S/P TOTAL KNEE ARTHROPLASTY, LEFT: Primary | ICD-10-CM

## 2023-06-06 DIAGNOSIS — F41.9 ANXIETY: ICD-10-CM

## 2023-06-06 DIAGNOSIS — M25.562 ACUTE PAIN OF LEFT KNEE: ICD-10-CM

## 2023-06-06 DIAGNOSIS — F33.42 RECURRENT MAJOR DEPRESSIVE DISORDER, IN FULL REMISSION: ICD-10-CM

## 2023-06-06 PROCEDURE — 97530 THERAPEUTIC ACTIVITIES: CPT | Performed by: PHYSICAL THERAPIST

## 2023-06-06 PROCEDURE — 97110 THERAPEUTIC EXERCISES: CPT | Performed by: PHYSICAL THERAPIST

## 2023-06-06 RX ORDER — FLUOXETINE HYDROCHLORIDE 40 MG/1
40 CAPSULE ORAL DAILY
Qty: 90 CAPSULE | Refills: 1 | Status: SHIPPED | OUTPATIENT
Start: 2023-06-06

## 2023-06-06 RX ORDER — MELOXICAM 15 MG/1
15 TABLET ORAL DAILY
Qty: 14 TABLET | Refills: 0 | Status: SHIPPED | OUTPATIENT
Start: 2023-06-06 | End: 2023-06-20

## 2023-06-06 NOTE — PROGRESS NOTES
Physical Therapy Daily Treatment Note    Patient: Ce YEBOAH [facility]   : 1970  Diagnosis/ICD-10 Code:  S/P total knee arthroplasty, left [Z96.652]  Referring practitioner: NICK Osborn  Date of Initial Visit: Type: THERAPY  Noted: 2023  Today's Date: 2023  Patient seen for 6 sessions    [facility] [place] [address]  [phone] (phone)  [phone] (fax)         Subjective it is a little sore today, helped my mom go through some papers and throw things away. Was up and down a lot.     Objective     Exercise:  -NuStep, seat 7, level 5, x6 min for ROM  -flexion stretch (in stretch cage) 4x20 sec  -5 inch step ups, x20  -4 inch step down , x10, single hand hold  -[place] leg press, 100lb x20, single leg 100lb x20  -seated hamstring curls, green band x20   -LAQ and SAQ x20 each with 3 sec hold, 1lb wt added today  -quad set, 10x 5 sec    -SLR x15 (cues to tighten quad prior to lifting leg)  -seated HS and calf stretch 3x20 sec    -manual stretching into flexion, 3x20 sec     Assessment/Plan  Pt able to step down a 4 inch step with single hand hold and good control. She has been able to ascend her steps at home with a reciprocal pattern, but holding to the handrail and wall.          Timed:    Manual Therapy:         mins  48507;  Therapeutic Exercise:    30     mins  66496;     Neuromuscular Terry:        mins  83522;    Therapeutic Activity:     10     mins  68258;     Gait Training:           mins  55252;     Ultrasound:          mins  19757;    Electrical Stimulation:         mins  88940 ( );  Iontophoresis         mins 45735;  Aquatic Therapy         mins 66591;      Untimed:  Electrical Stimulation:         mins  69594 ( );  Traction:         mins  50848;   Dry Needling   (1-2 muscles)             mins  (Self-pay)  Dry Needling (3-4 muscles)           mins  (Self-pay)  Dry Needling Trial              mins  DRYNDLTRIAL  (No Charge)    Timed Treatment:   40   mins   Total Treatment:     40   mins    Debby Long PT, CDNT  Physical Therapist    [place] License:[id number]   stated

## 2023-06-09 ENCOUNTER — TREATMENT (OUTPATIENT)
Dept: PHYSICAL THERAPY | Facility: CLINIC | Age: 53
End: 2023-06-09
Payer: COMMERCIAL

## 2023-06-09 DIAGNOSIS — M25.562 ACUTE PAIN OF LEFT KNEE: ICD-10-CM

## 2023-06-09 DIAGNOSIS — R26.9 GAIT DISTURBANCE: ICD-10-CM

## 2023-06-09 DIAGNOSIS — Z96.652 S/P TOTAL KNEE ARTHROPLASTY, LEFT: Primary | ICD-10-CM

## 2023-06-09 PROCEDURE — 97530 THERAPEUTIC ACTIVITIES: CPT | Performed by: PHYSICAL THERAPIST

## 2023-06-09 PROCEDURE — 97110 THERAPEUTIC EXERCISES: CPT | Performed by: PHYSICAL THERAPIST

## 2023-06-09 NOTE — PROGRESS NOTES
Physical Therapy Daily Treatment Note    Patient: Ce YEBOAH [facility]   : 1970  Diagnosis/ICD-10 Code:  S/P total knee arthroplasty, left [Z96.652]  Referring practitioner: NICK Osborn  Date of Initial Visit: Type: THERAPY  Noted: 2023  Today's Date: 2023  Patient seen for 7 sessions    [facility] [place] [address]  [phone] (phone)  [phone] (fax)         Subjective still with the swelling, I keep elevating and icing it    Objective          Active Range of Motion   Left Knee   Flexion: 115 degrees   Extension: 4 degrees     Passive Range of Motion   Left Knee   Flexion: 120 degrees   Extension: 0 degrees       Exercise:  -NuStep, seat 7, level 5, x6 min for ROM  -flexion stretch (in stretch cage) 4x20 sec  -5 inch step ups, x20  -4 inch step down , x10, single hand hold DEFER  -up and down 4 steps (6 inch) reciprocally with handrail, 3x  -[place] leg press, 100lb x20, single leg 100lb x20  -seated hamstring curls, green band x20   -LAQ and SAQ x20 each with 3 sec hold, 1lb wt added today  -quad set, 10x 5 sec    -SLR x15 (cues to tighten quad prior to lifting leg)  DEFER  -seated HS and calf stretch 3x20 sec    -manual stretching into flexion, 3x20 sec       Assessment/Plan  Ce is no longer walking with an AD and only minor deficits. She has improved her ROM and strength. Today she was able to perform 6 inch steps with a reciprocal pattern with using a handrail, mild compensation descending         Timed:    Manual Therapy:         mins  69051;  Therapeutic Exercise:    30     mins  35359;     Neuromuscular Terry:        mins  43503;    Therapeutic Activity:     12     mins  39338;     Gait Training:           mins  60730;     Ultrasound:          mins  88169;    Electrical Stimulation:         mins  06721 ( );  Iontophoresis         mins 02438;  Aquatic Therapy         mins 29869;      Untimed:  Electrical  Stimulation:         mins  81085 ( );  Traction:         mins  40361;   Dry Needling   (1-2 muscles)             mins 20560 (Self-pay)  Dry Needling (3-4 muscles)           mins 20561 (Self-pay)  Dry Needling Trial              mins DRYNDLTRIAL  (No Charge)    Timed Treatment:   42   mins   Total Treatment:     42   mins    Debby Long PT, CDNT  Physical Therapist    [place] License:[id number]

## 2023-06-12 ENCOUNTER — OFFICE VISIT (OUTPATIENT)
Dept: INTERNAL MEDICINE | Facility: CLINIC | Age: 53
End: 2023-06-12
Payer: COMMERCIAL

## 2023-06-12 VITALS
SYSTOLIC BLOOD PRESSURE: 138 MMHG | HEIGHT: 63 IN | DIASTOLIC BLOOD PRESSURE: 79 MMHG | BODY MASS INDEX: 42.58 KG/M2 | TEMPERATURE: 92 F | WEIGHT: 240.3 LBS

## 2023-06-12 DIAGNOSIS — R73.03 PREDIABETES: Primary | ICD-10-CM

## 2023-06-12 RX ORDER — ACETAMINOPHEN 500 MG
500 TABLET ORAL EVERY 6 HOURS PRN
COMMUNITY

## 2023-06-12 RX ORDER — PANTOPRAZOLE SODIUM 40 MG/1
40 TABLET, DELAYED RELEASE ORAL DAILY PRN
Qty: 90 TABLET | Refills: 1 | Status: SHIPPED | OUTPATIENT
Start: 2023-06-12

## 2023-06-12 NOTE — PROGRESS NOTES
"Chief Complaint  Prediabetes    Subjective        Ce Lebron presents to [facility]  History of Present Illness here to f/u after having TKA in May. She feels so much better, able to do more walking.  She now starting to work on herself.    She feels motivated to exercise and get herself back -     Objective   Vital Signs:  /79   Temp 92 °F (33.3 °C)   Ht 160 cm (62.99\")   Wt 109 kg (240 lb 4.8 oz)   BMI 42.58 kg/m²   Estimated body mass index is 42.58 kg/m² as calculated from the following:    Height as of this encounter: 160 cm (62.99\").    Weight as of this encounter: 109 kg (240 lb 4.8 oz).             Physical Exam  Constitutional:       Appearance: Normal appearance.   Cardiovascular:      Rate and Rhythm: Normal rate and regular rhythm.   Musculoskeletal:      Right lower leg: No edema.      Left lower leg: No edema.      Result Review :                   Assessment and Plan   Diagnoses and all orders for this visit:    1. Prediabetes (Primary)  Comments:  work on weight, exercise.  Wegovy should help.    2. BMI 40.0-44.9, adult  Comments:  wants to try the Wegovy instead of Saxenda- will start today and titrate as tolerated. Discussed side effects.             Follow Up   Return for Keep previously scheduled appointment.  Patient was given instructions and counseling regarding her condition or for health maintenance advice. Please see specific information pulled into the AVS if appropriate.         "

## 2023-06-13 ENCOUNTER — TREATMENT (OUTPATIENT)
Dept: PHYSICAL THERAPY | Facility: CLINIC | Age: 53
End: 2023-06-13
Payer: COMMERCIAL

## 2023-06-13 DIAGNOSIS — R26.9 GAIT DISTURBANCE: ICD-10-CM

## 2023-06-13 DIAGNOSIS — Z96.652 S/P TOTAL KNEE ARTHROPLASTY, LEFT: Primary | ICD-10-CM

## 2023-06-13 DIAGNOSIS — M25.562 ACUTE PAIN OF LEFT KNEE: ICD-10-CM

## 2023-06-13 PROCEDURE — 97110 THERAPEUTIC EXERCISES: CPT | Performed by: PHYSICAL THERAPIST

## 2023-06-13 PROCEDURE — 97530 THERAPEUTIC ACTIVITIES: CPT | Performed by: PHYSICAL THERAPIST

## 2023-06-13 NOTE — PROGRESS NOTES
Physical Therapy Daily Treatment Note    Patient: Ce YEBOAH [facility]   : 1970  Diagnosis/ICD-10 Code:  S/P total knee arthroplasty, left [Z96.652]  Referring practitioner: NICK Osborn  Date of Initial Visit: Type: THERAPY  Noted: 2023  Today's Date: 2023  Patient seen for 8 sessions    [facility] [place] [address]  [phone] (phone)  [phone] (fax)         Subjective  still having a hard time sleeping, was tired over the weekend and just rest and did house chores.     Objective     Exercise:  -NuStep, seat 7, level 5, x6 min for ROM  -flexion stretch (4th from top level hip machine) 4x20 sec  -6 inch step ups, x20  -4 inch step down , x10, single hand hold  -[place] leg press, 100lb x20, single leg 100lb x20  -seated hamstring curls, green band x20   -TKE green band, 20x 3 sec hold  -LAQ and SAQ x20 each with 3 sec hold, 1lb wt     -SLR x15 (cues to tighten quad prior to lifting leg)  DEFER  -seated HS and calf stretch 3x20 sec    -manual stretching into flexion, 3x20 sec  -sit to stand from mat table x10    Assessment/Plan  Pt is progressing well with her strength and ROM. She is still having a lot of difficulty with sleeping at times, but is not taking any naps. She hs increased her time on her bike at home to 17 min of resistance and is taking short walks, trying to get prepped to get back to work.          Timed:    Manual Therapy:         mins  50116;  Therapeutic Exercise:    33     mins  00945;     Neuromuscular Terry:        mins  37408;    Therapeutic Activity:     10     mins  99997;     Gait Training:           mins  06760;     Ultrasound:          mins  90079;    Electrical Stimulation:         mins  54404 ( );  Iontophoresis         mins 85359;  Aquatic Therapy         mins 89194;      Untimed:  Electrical Stimulation:         mins  30319 ( );  Traction:         mins  01450;   Dry Needling   (1-2  muscles)             mins 20560 (Self-pay)  Dry Needling (3-4 muscles)           mins 20561 (Self-pay)  Dry Needling Trial              mins DRYNDLTRIAL  (No Charge)    Timed Treatment:   43   mins   Total Treatment:     43   mins    Debby Long PT, CDNT  Physical Therapist    [place] License:[id number]

## 2023-06-16 ENCOUNTER — TREATMENT (OUTPATIENT)
Dept: PHYSICAL THERAPY | Facility: CLINIC | Age: 53
End: 2023-06-16
Payer: COMMERCIAL

## 2023-06-16 DIAGNOSIS — M25.562 ACUTE PAIN OF LEFT KNEE: ICD-10-CM

## 2023-06-16 DIAGNOSIS — R26.9 GAIT DISTURBANCE: ICD-10-CM

## 2023-06-16 DIAGNOSIS — Z96.652 S/P TOTAL KNEE ARTHROPLASTY, LEFT: Primary | ICD-10-CM

## 2023-06-16 PROCEDURE — 97110 THERAPEUTIC EXERCISES: CPT | Performed by: PHYSICAL THERAPIST

## 2023-06-16 PROCEDURE — 97530 THERAPEUTIC ACTIVITIES: CPT | Performed by: PHYSICAL THERAPIST

## 2023-06-16 NOTE — PROGRESS NOTES
Physical Therapy Daily Treatment Note    Patient: Ce YEBOAH [facility]   : 1970  Diagnosis/ICD-10 Code:  S/P total knee arthroplasty, left [Z96.652]  Referring practitioner: NICK Osborn  Date of Initial Visit: Type: THERAPY  Noted: 2023  Today's Date: 2023  Patient seen for 9 sessions    [facility] [place] [address]  [phone] (phone)  [phone] (fax)         Subjective slept great Wed pm, then couldn't sleep at all last night.     Objective          Active Range of Motion   Left Knee   Flexion: 118 degrees   Extension: 5 degrees     Passive Range of Motion   Left Knee   Flexion: 125 degrees         Exercise:  -NuStep, seat 7, level 5, x6 min for ROM  -flexion stretch (4th from top level hip machine) 4x20 sec  -6 inch step ups, x20  -4 inch step down , 2x10, single hand hold  -[place] leg press, 100lb x20, single leg 100lb x20  -seated hamstring curls, green band x20   -TKE green band, 20x 3 sec hold  -LAQ and SAQ x20 each with 3 sec hold, 1lb wt     -SLR x15 (cues to tighten quad prior to lifting leg)  DEFER  -seated HS and calf stretch 3x20 sec    -manual stretching into flexion, 3x20 sec  -sit to stand from mat table x10      Assessment/Plan  Deb is a little more sore today. Took a walk with her dog, dog was pulling a little bit, and also gave her a bath (sore in her back, legs, and shoulders). She does have mild edema and recommended to ice and elevate several times today. Her ROM continues to improve, some pain descending 4 inch step         Timed:    Manual Therapy:         mins  98469;  Therapeutic Exercise:    30     mins  90165;     Neuromuscular Terry:        mins  29967;    Therapeutic Activity:     12     mins  33125;     Gait Training:           mins  77496;     Ultrasound:          mins  18660;    Electrical Stimulation:         mins  69201 ( );  Iontophoresis         mins 53927;  Aquatic Therapy         mins  34425;      Untimed:  Electrical Stimulation:         mins  86438 ( );  Traction:         mins  49396;   Dry Needling   (1-2 muscles)             mins 20560 (Self-pay)  Dry Needling (3-4 muscles)           mins 20561 (Self-pay)  Dry Needling Trial              mins DRYNDLTRIAL  (No Charge)    Timed Treatment:   42   mins   Total Treatment:     42   mins    Debby Long PT, CDNT  Physical Therapist    [place] License:[id number]

## 2023-08-04 ENCOUNTER — OFFICE VISIT (OUTPATIENT)
Dept: ORTHOPEDIC SURGERY | Facility: CLINIC | Age: 53
End: 2023-08-04
Payer: COMMERCIAL

## 2023-08-04 VITALS — BODY MASS INDEX: 42.52 KG/M2 | WEIGHT: 240 LBS | TEMPERATURE: 97.9 F | HEIGHT: 63 IN

## 2023-08-04 DIAGNOSIS — R52 PAIN: ICD-10-CM

## 2023-08-04 DIAGNOSIS — M54.16 LUMBAR RADICULOPATHY: Primary | ICD-10-CM

## 2023-08-04 DIAGNOSIS — M53.3 SACROILIAC PAIN: ICD-10-CM

## 2023-08-04 PROCEDURE — 99213 OFFICE O/P EST LOW 20 MIN: CPT | Performed by: NURSE PRACTITIONER

## 2023-08-04 RX ORDER — SPIRONOLACTONE 25 MG/1
TABLET ORAL
COMMUNITY

## 2023-08-04 RX ORDER — IBUPROFEN 800 MG/1
800 TABLET ORAL
COMMUNITY

## 2023-08-04 RX ORDER — MELOXICAM 15 MG/1
TABLET ORAL
COMMUNITY

## 2023-08-04 RX ORDER — PHENDIMETRAZINE TARTRATE 105 MG/1
CAPSULE, EXTENDED RELEASE ORAL
COMMUNITY

## 2023-08-04 RX ORDER — BUPROPION HYDROCHLORIDE 300 MG/1
300 TABLET ORAL
COMMUNITY

## 2023-08-04 RX ORDER — HYDROCHLOROTHIAZIDE 25 MG/1
TABLET ORAL
COMMUNITY

## 2023-08-11 ENCOUNTER — TREATMENT (OUTPATIENT)
Dept: PHYSICAL THERAPY | Facility: CLINIC | Age: 53
End: 2023-08-11
Payer: COMMERCIAL

## 2023-08-11 DIAGNOSIS — M25.562 ACUTE PAIN OF LEFT KNEE: ICD-10-CM

## 2023-08-11 DIAGNOSIS — Z96.652 S/P TOTAL KNEE ARTHROPLASTY, LEFT: Primary | ICD-10-CM

## 2023-08-11 DIAGNOSIS — R26.9 GAIT DISTURBANCE: ICD-10-CM

## 2023-08-11 PROCEDURE — 97530 THERAPEUTIC ACTIVITIES: CPT | Performed by: PHYSICAL THERAPIST

## 2023-08-11 PROCEDURE — 97110 THERAPEUTIC EXERCISES: CPT | Performed by: PHYSICAL THERAPIST

## 2023-08-11 NOTE — PROGRESS NOTES
Physical Therapy Daily Treatment Note    Patient: Ce YEBOAH [facility]   : 1970  Diagnosis/ICD-10 Code:  S/P total knee arthroplasty, left [Z96.652]  Referring practitioner: NICK Osborn  Date of Initial Visit: Type: THERAPY  Noted: 2023  Today's Date: 2023  Patient seen for 15 sessions    [facility] [place] [address] [place] [address]  [phone] (phone)  [phone] (fax)         Subjective  started back to work, haven't been crazy busy so it has been ok    Objective          Active Range of Motion   Left Knee   Flexion: 123 degrees   Extension: 3 degrees       Exercise:  -NuStep, seat 7, level 5, x6 min for ROM  -flexion stretch (4th from top level hip machine) 4x20 sec  -8 inch step ups, x20  -6 inch step down , 2x10, single hand hold  DEFER  -[place] leg press, 110lb x20, single leg 110lb x20  -[place] hip abd, 2x20, 50lb  -seated hamstring curls, green band x20   -TKE green band, 20x 3 sec hold  -LAQ and SAQ x20 each with 3 sec hold, 1lb wt     -SLR x15 (cues to tighten quad prior to lifting leg)  DEFER  -seated HS and calf stretch 3x20 sec    -manual stretching into flexion, 3x20 sec    -sit to stand from mat table x10  DEFER  -tandem walking along wall 2x20 feet  DEFER  -high knee march walk, 2x20 feet   DEFER       Assessment/Plan  Pt has started back to work and is doing well. She notes some swelling still in the leg/knee, but not bad. She is able to perform stairs here with a reciprocal pattern, but her stairs at home don't have a railing the whole length, so she goes back to a step to pattern then. ROM is good         Timed:    Manual Therapy:         mins  14298;  Therapeutic Exercise:    30     mins  53106;     Neuromuscular Terry:        mins  99969;    Therapeutic Activity:     10     mins  88389;     Gait Training:           mins  67899;     Ultrasound:          mins  05187;    Electrical Stimulation:         mins  90346 (  );  Iontophoresis         mins 51899;  Aquatic Therapy         mins 96373;      Untimed:  Electrical Stimulation:         mins  02817 ( );  Traction:         mins  70316;   Dry Needling   (1-2 muscles)             mins 20560 (Self-pay)  Dry Needling (3-4 muscles)           mins 20561 (Self-pay)  Dry Needling Trial              mins DRYNDLTRIAL  (No Charge)    Timed Treatment:   40   mins   Total Treatment:     40   mins    Debby Long PT, CDNT  Physical Therapist    [place] License:[id number]

## 2023-08-14 DIAGNOSIS — F41.9 ANXIETY: Primary | ICD-10-CM

## 2023-08-14 DIAGNOSIS — M15.9 GENERALIZED OSTEOARTHRITIS: ICD-10-CM

## 2023-08-14 RX ORDER — BUPROPION HYDROCHLORIDE 300 MG/1
300 TABLET ORAL EVERY MORNING
Qty: 90 TABLET | Refills: 1 | Status: SHIPPED | OUTPATIENT
Start: 2023-08-14

## 2023-08-14 RX ORDER — IBUPROFEN 800 MG/1
800 TABLET ORAL EVERY 8 HOURS PRN
Qty: 60 TABLET | Refills: 1 | Status: SHIPPED | OUTPATIENT
Start: 2023-08-14

## 2023-08-14 RX ORDER — CYCLOBENZAPRINE HCL 10 MG
10 TABLET ORAL 3 TIMES DAILY PRN
Qty: 90 TABLET | Refills: 0 | OUTPATIENT
Start: 2023-08-14

## 2023-08-14 RX ORDER — PHENDIMETRAZINE TARTRATE 105 MG/1
CAPSULE, EXTENDED RELEASE ORAL
OUTPATIENT
Start: 2023-08-14

## 2023-08-14 RX ORDER — IBUPROFEN 800 MG/1
800 TABLET ORAL
OUTPATIENT
Start: 2023-08-14

## 2023-08-14 RX ORDER — BUPROPION HYDROCHLORIDE 300 MG/1
300 TABLET ORAL
OUTPATIENT
Start: 2023-08-14

## 2023-08-14 RX ORDER — SPIRONOLACTONE AND HYDROCHLOROTHIAZIDE 25; 25 MG/1; MG/1
1 TABLET ORAL DAILY
Qty: 90 TABLET | Refills: 1 | Status: SHIPPED | OUTPATIENT
Start: 2023-08-14

## 2023-08-25 ENCOUNTER — TELEPHONE (OUTPATIENT)
Dept: ORTHOPEDIC SURGERY | Facility: CLINIC | Age: 53
End: 2023-08-25
Payer: COMMERCIAL

## 2023-08-25 NOTE — TELEPHONE ENCOUNTER
Call returned to the patient.  Patient states that she got a denial letter of this week from her insurance company stating that they did denied the ROM tech device.  Patient also saw on her insurance statement that her EOB showed that there were charges up to $6300 billed to her insurance through ROM tech.  I explained to her that the EOB is not a bill.  Will get in touch with the ROM tech rep and then get back with the patient.

## 2023-08-25 NOTE — TELEPHONE ENCOUNTER
I have contacted the patient after speaking with the ROM tech representative.  According to their records they have approval from the insurance company which is why they delivered the device.  We are still puzzled as to why the patient just now got a denial letter since her surgery was 3 months ago.  They will have their financial department reach out to the patient.  I have advised her that if this cannot be rectified she needs to contact me at the office.

## 2023-08-25 NOTE — TELEPHONE ENCOUNTER
----- Message from Shannan Coy sent at 8/25/2023 10:06 AM EDT -----  Regarding: ROM-TECH BIKE  Contact: [phone]  Please review pt message regarding ROM tech      ----- Message -----  From: Ce Lebron  Sent: 8/25/2023   9:56 AM EDT  To: [facility]  Subject: ROM-TECH BIKE                                    Good morning,    I received notification that my insurance will not pay for the use of the rom-tech bike for my total knee recovery.  When I agreed to use it, I was told that if my insurance didn't cover it, the office will since it is investigational still.    The bill is $6300.  As of right now I've just received notice that it isn't covered and I will owe the entire bill.  What are my next steps with this?      Thank you  Ce Lebron

## 2023-09-05 RX ORDER — MELOXICAM 7.5 MG/1
7.5 TABLET ORAL DAILY
Qty: 90 TABLET | Refills: 0 | Status: SHIPPED | OUTPATIENT
Start: 2023-09-05

## 2023-09-25 RX ORDER — MELOXICAM 7.5 MG/1
7.5 TABLET ORAL DAILY
Qty: 90 TABLET | Refills: 0 | OUTPATIENT
Start: 2023-09-25

## 2023-09-25 RX ORDER — SEMAGLUTIDE 1 MG/.5ML
1 INJECTION, SOLUTION SUBCUTANEOUS WEEKLY
Qty: 2 ML | Refills: 1 | Status: SHIPPED | OUTPATIENT
Start: 2023-09-25

## 2023-10-17 RX ORDER — CYCLOBENZAPRINE HCL 10 MG
10 TABLET ORAL 3 TIMES DAILY PRN
Qty: 90 TABLET | Refills: 0 | Status: SHIPPED | OUTPATIENT
Start: 2023-10-17

## 2023-10-17 RX ORDER — MELOXICAM 7.5 MG/1
7.5 TABLET ORAL DAILY
Qty: 90 TABLET | Refills: 0 | OUTPATIENT
Start: 2023-10-17

## 2023-10-23 RX ORDER — SEMAGLUTIDE 1.7 MG/.75ML
1.7 INJECTION, SOLUTION SUBCUTANEOUS WEEKLY
Qty: 3 ML | Refills: 0 | Status: SHIPPED | OUTPATIENT
Start: 2023-10-23

## 2023-10-25 ENCOUNTER — TRANSCRIBE ORDERS (OUTPATIENT)
Dept: ADMINISTRATIVE | Facility: HOSPITAL | Age: 53
End: 2023-10-25
Payer: COMMERCIAL

## 2023-10-25 DIAGNOSIS — Z12.31 SCREENING MAMMOGRAM FOR BREAST CANCER: Primary | ICD-10-CM

## 2023-11-02 ENCOUNTER — OFFICE VISIT (OUTPATIENT)
Dept: ORTHOPEDIC SURGERY | Facility: CLINIC | Age: 53
End: 2023-11-02
Payer: COMMERCIAL

## 2023-11-02 VITALS — WEIGHT: 242.8 LBS | BODY MASS INDEX: 43.02 KG/M2 | TEMPERATURE: 97.7 F | HEIGHT: 63 IN

## 2023-11-02 DIAGNOSIS — R52 PAIN: Primary | ICD-10-CM

## 2023-11-02 DIAGNOSIS — M17.11 PRIMARY OSTEOARTHRITIS OF RIGHT KNEE: ICD-10-CM

## 2023-11-02 RX ORDER — METHYLPREDNISOLONE ACETATE 80 MG/ML
80 INJECTION, SUSPENSION INTRA-ARTICULAR; INTRALESIONAL; INTRAMUSCULAR; SOFT TISSUE
Status: COMPLETED | OUTPATIENT
Start: 2023-11-02 | End: 2023-11-02

## 2023-11-02 RX ADMIN — METHYLPREDNISOLONE ACETATE 80 MG: 80 INJECTION, SUSPENSION INTRA-ARTICULAR; INTRALESIONAL; INTRAMUSCULAR; SOFT TISSUE at 09:48

## 2023-11-02 NOTE — PROGRESS NOTES
Patient: Ce Lebron  YOB: 1970 52 y.o. female  Medical Record Number: [id number]    Chief Complaints:   Chief Complaint   Patient presents with   • Right Knee - Initial Evaluation       History of Present Illness:Ce Lebron is a 52 y.o. female who presents with complaints of increasing right knee pain.  Patient had previous left total knee replacement has been doing great with regards to her left knee started with some increased pain right knee a few weeks ago, it has progressively gotten worse.  She denies any recent injury.  Reports the pain is worse with increased activity and walking, only slightly better with rest    Allergies:   Allergies   Allergen Reactions   • Avocado Anaphylaxis   • Latex Anaphylaxis, Itching, Dermatitis and Rash     THROAT SWELLING   • Sulfa Antibiotics Itching   • Adhesive Tape Swelling     Gets some redness and swelling       Medications:   Current Outpatient Medications   Medication Sig Dispense Refill   • acetaminophen (TYLENOL) 500 MG tablet Take 1 tablet by mouth Every 6 (Six) Hours As Needed for Mild Pain.     • albuterol sulfate HFA (ProAir HFA) 108 (90 Base) MCG/ACT inhaler Inhale 1 puff Every 4 (Four) Hours As Needed for Wheezing or Shortness of Air. 8.5 g 3   • aspirin 81 MG EC tablet Take 1 tablet by mouth 2 times a day for 14 days, then take 1 tablet by mouth daily for 28 days. 60 tablet 0   • azelaic acid (AZELEX) 15 % gel Apply a small amount to affected area twice a day 50 g 3   • cephalexin (KEFLEX) 500 MG capsule Take all 4 capsules by mouth one hour prior to procedure 4 capsule 5   • cetirizine (zyrTEC) 10 MG tablet Take 1 tablet by mouth Daily As Needed for Allergies.     • cyclobenzaprine (FLEXERIL) 10 MG tablet Take 1 tablet by mouth 3 (Three) Times a Day As Needed for Muscle Spasms. 90 tablet 0   • FLUoxetine (PROzac) 40 MG capsule Take 1 capsule by mouth Daily. 90 capsule 1   • fluticasone (FLONASE) 50 MCG/ACT nasal spray 2 sprays into the  nostril(s) as directed by provider Daily As Needed for Allergies.     • ibuprofen (ADVIL,MOTRIN) 800 MG tablet Take 1 tablet by mouth Every 8 (Eight) Hours As Needed for Mild Pain. 60 tablet 1   • meloxicam (Mobic) 7.5 MG tablet Take 1 tablet by mouth Daily. 90 tablet 0   • pantoprazole (Protonix) 40 MG EC tablet Take 1 tablet by mouth Daily As Needed (dyspepsia). 90 tablet 1   • pseudoephedrine (SUDAFED) 30 MG tablet Take 2 tablets by mouth As Needed for Congestion.     • Semaglutide-Weight Management (Wegovy) 1.7 MG/0.75ML solution auto-injector Inject 0.75 mL under the skin into the appropriate area as directed 1 (One) Time Per Week. 3 mL 0   • spironolactone-hydrochlorothiazide (Aldactazide) 25-25 MG tablet Take 1 tablet by mouth Daily. 90 tablet 1   • azelaic acid (AZELEX) 15 % gel Apply a small amount topically to the appropriate area as directed 2 (Two) Times a Day. (Patient not taking: Reported on 11/2/2023) 50 g 11   • hydroCHLOROthiazide (HYDRODIURIL) 25 MG tablet Take 1 tablet every day by oral route for 30 days.     • HYDROcodone-acetaminophen (NORCO) 7.5-325 MG per tablet Take 1 tablet by mouth Every 4 (Four) Hours As Needed for Moderate Pain or Severe Pain. (Patient not taking: Reported on 11/2/2023) 20 tablet 0   • ondansetron (Zofran) 4 MG tablet Take 1 tablet by mouth Every 8 (Eight) Hours As Needed for Nausea or Vomiting for up to 10 doses. (Patient not taking: Reported on 11/2/2023) 10 tablet 0   • Phendimetrazine Tartrate  MG capsule sustained-release 24 hr Take 1 capsule every day by oral route.     • spironolactone (ALDACTONE) 25 MG tablet        No current facility-administered medications for this visit.         The following portions of the patient's history were reviewed and updated as appropriate: allergies, current medications, past family history, past medical history, past social history, past surgical history and problem list.    Review of Systems:   14 point review of systems  "was performed. All systems negative except pertinent positives/negatives listed in HPI above    Physical Exam:   Vitals:    11/02/23 0914   Temp: 97.7 °F (36.5 °C)   TempSrc: Temporal   Weight: 110 kg (242 lb 12.8 oz)   Height: 160 cm (62.99\")   PainSc:   4   PainLoc: Knee       General: A and O x 3, ASA, NAD    Skin clear no unusual lesions noted  Right knee patient has trace amount of effusion noted with 116 degrees flexion neutral in extension with a positive Ignacio negative Lockman calf soft and nontender      Radiology:  Xrays 3views (ap,lateral, sunrise) right knee were ordered and reviewed today secondary to pain that is increasing and shows areas of bone-on-bone end-stage osteoarthritis.  Comparative views show definite progression in arthritic changes    Assessment/Plan: End-stage osteoarthritis right knee with increased pain    Patient and I discussed options, we will proceed with right knee cortisone injection, will increase prescription for meloxicam to 15 mg daily,, kidney function test have been evaluated they have been normal, continue physical therapy exercises and would be happy to see her back as needed, unfortunately patient moving to [place], but she knows she can see us anytime    Large Joint Arthrocentesis: R knee  Date/Time: 11/2/2023 9:48 AM  Consent given by: patient  Site marked: site marked  Timeout: Immediately prior to procedure a time out was called to verify the correct patient, procedure, equipment, support staff and site/side marked as required   Supporting Documentation  Indications: pain and joint swelling   Procedure Details  Location: knee - R knee  Preparation: Patient was prepped and draped in the usual sterile fashion  Needle size: 22 G  Approach: anterolateral  Medications administered: 80 mg methylPREDNISolone acetate 80 MG/ML; 2 mL lidocaine (cardiac)  Patient tolerance: patient tolerated the procedure well with no immediate complications           Ana Adkins, " APRN  11/2/2023

## 2023-11-08 DIAGNOSIS — M54.16 LUMBAR RADICULOPATHY: Primary | ICD-10-CM

## 2023-11-09 ENCOUNTER — HOSPITAL ENCOUNTER (OUTPATIENT)
Dept: MAMMOGRAPHY | Facility: HOSPITAL | Age: 53
Discharge: HOME | End: 2023-11-09
Admitting: INTERNAL MEDICINE
Payer: COMMERCIAL

## 2023-11-09 DIAGNOSIS — Z12.31 SCREENING MAMMOGRAM FOR BREAST CANCER: ICD-10-CM

## 2023-11-09 PROCEDURE — 77063 BREAST TOMOSYNTHESIS BI: CPT

## 2023-11-09 PROCEDURE — 77067 SCR MAMMO BI INCL CAD: CPT

## 2023-11-16 ENCOUNTER — HOSPITAL ENCOUNTER (OUTPATIENT)
Dept: GENERAL RADIOLOGY | Facility: HOSPITAL | Age: 53
Discharge: HOME | End: 2023-11-16
Payer: COMMERCIAL

## 2023-11-16 ENCOUNTER — HOSPITAL ENCOUNTER (OUTPATIENT)
Dept: PAIN MEDICINE | Facility: HOSPITAL | Age: 53
Discharge: HOME | End: 2023-11-16
Payer: COMMERCIAL

## 2023-11-16 ENCOUNTER — ANESTHESIA (OUTPATIENT)
Dept: PAIN MEDICINE | Facility: HOSPITAL | Age: 53
End: 2023-11-16
Payer: COMMERCIAL

## 2023-11-16 ENCOUNTER — ANESTHESIA EVENT (OUTPATIENT)
Dept: PAIN MEDICINE | Facility: HOSPITAL | Age: 53
End: 2023-11-16
Payer: COMMERCIAL

## 2023-11-16 VITALS
TEMPERATURE: 98 F | SYSTOLIC BLOOD PRESSURE: 108 MMHG | DIASTOLIC BLOOD PRESSURE: 82 MMHG | OXYGEN SATURATION: 97 % | RESPIRATION RATE: 14 BRPM | HEART RATE: 80 BPM

## 2023-11-16 DIAGNOSIS — R52 PAIN: ICD-10-CM

## 2023-11-16 DIAGNOSIS — M54.16 LUMBAR RADICULOPATHY: Primary | ICD-10-CM

## 2023-11-16 PROCEDURE — 25010000002 DEXAMETHASONE PER 1 MG: Performed by: ANESTHESIOLOGY

## 2023-11-16 PROCEDURE — 25010000002 DEXAMETHASONE SODIUM PHOSPHATE 10 MG/ML SOLUTION: Performed by: ANESTHESIOLOGY

## 2023-11-16 PROCEDURE — 25510000001 IOPAMIDOL 41 % SOLUTION: Performed by: ANESTHESIOLOGY

## 2023-11-16 PROCEDURE — 77003 FLUOROGUIDE FOR SPINE INJECT: CPT

## 2023-11-16 RX ORDER — FENTANYL CITRATE 50 UG/ML
50 INJECTION, SOLUTION INTRAMUSCULAR; INTRAVENOUS ONCE
Status: DISCONTINUED | OUTPATIENT
Start: 2023-11-16 | End: 2023-11-17 | Stop reason: HOSPADM

## 2023-11-16 RX ORDER — MIDAZOLAM HYDROCHLORIDE 1 MG/ML
1 INJECTION INTRAMUSCULAR; INTRAVENOUS ONCE AS NEEDED
Status: DISCONTINUED | OUTPATIENT
Start: 2023-11-16 | End: 2023-11-17 | Stop reason: HOSPADM

## 2023-11-16 RX ORDER — LIDOCAINE HYDROCHLORIDE 10 MG/ML
1 INJECTION, SOLUTION INFILTRATION; PERINEURAL ONCE
Status: DISCONTINUED | OUTPATIENT
Start: 2023-11-16 | End: 2023-11-17 | Stop reason: HOSPADM

## 2023-11-16 RX ORDER — DEXAMETHASONE SODIUM PHOSPHATE 10 MG/ML
10 INJECTION, SOLUTION INTRAMUSCULAR; INTRAVENOUS ONCE
Status: COMPLETED | OUTPATIENT
Start: 2023-11-16 | End: 2023-11-16

## 2023-11-16 RX ORDER — IOPAMIDOL 408 MG/ML
10 INJECTION, SOLUTION INTRATHECAL
Status: COMPLETED | OUTPATIENT
Start: 2023-11-16 | End: 2023-11-16

## 2023-11-16 RX ORDER — DEXAMETHASONE SODIUM PHOSPHATE 4 MG/ML
INJECTION, SOLUTION INTRA-ARTICULAR; INTRALESIONAL; INTRAMUSCULAR; INTRAVENOUS; SOFT TISSUE
Status: COMPLETED | OUTPATIENT
Start: 2023-11-16 | End: 2023-11-16

## 2023-11-16 RX ADMIN — DEXAMETHASONE SODIUM PHOSPHATE 10 MG: 10 INJECTION, SOLUTION INTRAMUSCULAR; INTRAVENOUS at 08:29

## 2023-11-16 RX ADMIN — IOPAMIDOL 10 ML: 408 INJECTION, SOLUTION INTRATHECAL at 08:29

## 2023-11-16 RX ADMIN — DEXAMETHASONE SODIUM PHOSPHATE 10 MG: 4 INJECTION, SOLUTION INTRA-ARTICULAR; INTRALESIONAL; INTRAMUSCULAR; INTRAVENOUS; SOFT TISSUE at 08:38

## 2023-11-16 NOTE — H&P
[facility]    History and Physical    Patient Name: Ce YEBOAH [facility]  :  1970  MRN:  [id number]  Date of Admission: 2023    Subjective     Patient is a 52 y.o. female presents with chief complaint of acute on chronic, intermittent, moderate low back and right lower extremity  pain.  Onset of symptoms was gradual starting 8 weeks ago.  Symptoms are associated/aggravated by activity or walking for more than a few minutes. Symptoms improve with pain medication and rest.  Pain scale from 0-10, she rates her pain as a 1 while at rest and 4 with activity.  She describes the pain as sharp and stabbing in nature.  She was referred to the pain clinic for a series of lumbar epidural steroid injections.    Her most recent lumbar MRI results are as follows:    FINDINGS: The tip of the conus lies at L1 and the lower thoracic spine  demonstrates mild disc narrowing but no stenosis. The distal spinal cord  is normal in caliber and signal.     At T12-L1, the disc is narrowed. The canal and foramina are patent.     At L1-2, there is disc narrowing with a ridge of endplate osteophyte and  minimal disc bulge which indent the thecal sac slightly. The canal and  foramina are widely patent.     At L2-3 and L3-4, there is disc narrowing and mild hypertrophic facet  change. The canal and foramina are patent.     At L4-5, there is disc narrowing with vacuum phenomenon and degenerative  endplate signal change laterally on the right. There is a chronic right  posterolateral disc extrusion which creates right lateral recess  stenosis and abuts and displaces the exiting right L4 nerve root. The  canal, lateral recesses, and the left foramen are patent.     At L5-S1, the disc is narrowed with a tiny left subarticular disc  protrusion which mildly narrows the left lateral recess. 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     The elements of the cauda equina otherwise appear  normal. Paraspinous  musculature is deconditioned. Visualized retroperitoneal structures  appear normal.    The following portions of the patients history were reviewed and updated as appropriate: current medications, allergies, past medical history, past surgical history, past family history, past social history, and problem list                Objective     Past Medical History:   Past Medical History:   Diagnosis Date    Allergic     Anemia     Ankle swelling     Anxiety and depression     Arthritis     Back problem     LOW BACK PAIN, DISC ISSUES    Cervical disc disorder     GERD (gastroesophageal reflux disease)     Knee problem     Knee swelling     Left knee pain     Low back pain     Lumbosacral disc disease     Methicillin resistant Staph aureus culture positive     HX 10-15 YEARS AGO, NASAL, TREATED [facility]    Obesity     PONV (postoperative nausea and vomiting)     Prediabetes     S/P cervical spinal fusion     Shortness of breath on exertion     FROM DECONDITIONING FROM KNEE PAIN    SVT (supraventricular tachycardia)     HX, ANXIETY RELATED PER PT    Tendinitis of right elbow      Past Surgical History:   Past Surgical History:   Procedure Laterality Date    ADENOIDECTOMY      ANTERIOR CERVICAL DISCECTOMY W/ FUSION      C6-7    APPENDECTOMY  2012     SECTION      DIAGNOSTIC LAPAROSCOPY, SALPINGO OOPHORECTOMY LAPAROSCOPIC  2008    ENDOMETRIAL ABLATION  10/2012    EPIDURAL BLOCK      HYSTERECTOMY      LAPAROSCOPIC CHOLECYSTECTOMY  2005    LASIK  2000    NECK SURGERY      [facility]    OOPHORECTOMY      TONSILLECTOMY      TOTAL KNEE ARTHROPLASTY Left 2023    Procedure: TOTAL KNEE ARTHROPLASTY;  Surgeon: James Coy MD;  [facility]  Service: Orthopedics;  Laterality: Left;    VENTRAL HERNIA REPAIR  2016     Family History:   Family History   Problem Relation Age of Onset    Breast cancer Mother     Stroke Mother      Hypertension Mother     Hyperlipidemia Mother     Cancer Mother         Breast ca    Diabetes Mother     Mental illness Mother     Depression Mother     Osteoporosis Mother     Hypertension Father     Hyperlipidemia Father     Drug abuse Sister     Depression Sister     Mental illness Sister     ADD / ADHD Son     Asthma Son     Heart disease Maternal Grandmother     Colon cancer Maternal Grandfather     Throat cancer Paternal Grandfather     Malig Hyperthermia Neg Hx      Social History:   Social History     Socioeconomic History    Marital status: Single   Tobacco Use    Smoking status: Former     Packs/day: 1.00     Years: 8.00     Additional pack years: 0.00     Total pack years: 8.00     Types: Cigarettes     Quit date: 2015     Years since quittin.8     Passive exposure: Past    Smokeless tobacco: Never   Vaping Use    Vaping Use: Never used   Substance and Sexual Activity    Alcohol use: Not Currently     Comment: rarely    Drug use: Never    Sexual activity: Not Currently     Partners: Male     Birth control/protection: None       Vital Signs Range for the last 24 hours  Temperature: Temp:  [36.7 °C (98 °F)] 36.7 °C (98 °F)   Temp Source: Temp src: Oral   BP: BP: (127)/(88) 127/88   Pulse: Heart Rate:  [95] 95   Respirations: Resp:  [16] 16   SPO2: SpO2:  [98 %] 98 %   O2 Amount (l/min):     O2 Devices Device (Oxygen Therapy): room air   Weight:           --------------------------------------------------------------------------------    Current Outpatient Medications   Medication Sig Dispense Refill    azelaic acid (AZELEX) 15 % gel Apply a small amount to affected area twice a day 50 g 3    cyclobenzaprine (FLEXERIL) 10 MG tablet Take 1 tablet by mouth 3 (Three) Times a Day As Needed for Muscle Spasms. 90 tablet 0    FLUoxetine (PROzac) 40 MG capsule Take 1 capsule by mouth Daily. 90 capsule 1    ibuprofen (ADVIL,MOTRIN) 800 MG tablet Take 1 tablet by mouth Every 8 (Eight) Hours As Needed for Mild  Pain. 60 tablet 1    meloxicam (Mobic) 7.5 MG tablet Take 1 tablet by mouth Daily. 90 tablet 0    ondansetron (Zofran) 4 MG tablet Take 1 tablet by mouth Every 8 (Eight) Hours As Needed for Nausea or Vomiting for up to 10 doses. 10 tablet 0    pantoprazole (Protonix) 40 MG EC tablet Take 1 tablet by mouth Daily As Needed (dyspepsia). 90 tablet 1    Semaglutide-Weight Management (Wegovy) 1.7 MG/0.75ML solution auto-injector Inject 0.75 mL under the skin into the appropriate area as directed 1 (One) Time Per Week. 3 mL 0    spironolactone-hydrochlorothiazide (Aldactazide) 25-25 MG tablet Take 1 tablet by mouth Daily. 90 tablet 1    acetaminophen (TYLENOL) 500 MG tablet Take 1 tablet by mouth Every 6 (Six) Hours As Needed for Mild Pain.      albuterol sulfate HFA (ProAir HFA) 108 (90 Base) MCG/ACT inhaler Inhale 1 puff Every 4 (Four) Hours As Needed for Wheezing or Shortness of Air. 8.5 g 3    aspirin 81 MG EC tablet Take 1 tablet by mouth 2 times a day for 14 days, then take 1 tablet by mouth daily for 28 days. 60 tablet 0    azelaic acid (AZELEX) 15 % gel Apply a small amount topically to the appropriate area as directed 2 (Two) Times a Day. (Patient not taking: Reported on 11/2/2023) 50 g 11    cephalexin (KEFLEX) 500 MG capsule Take all 4 capsules by mouth one hour prior to procedure 4 capsule 5    cetirizine (zyrTEC) 10 MG tablet Take 1 tablet by mouth Daily As Needed for Allergies.      fluticasone (FLONASE) 50 MCG/ACT nasal spray 2 sprays into the nostril(s) as directed by provider Daily As Needed for Allergies.      hydroCHLOROthiazide (HYDRODIURIL) 25 MG tablet Take 1 tablet every day by oral route for 30 days.      HYDROcodone-acetaminophen (NORCO) 7.5-325 MG per tablet Take 1 tablet by mouth Every 4 (Four) Hours As Needed for Moderate Pain or Severe Pain. (Patient not taking: Reported on 11/2/2023) 20 tablet 0    Phendimetrazine Tartrate  MG capsule sustained-release 24 hr Take 1 capsule every day by  oral route.      pseudoephedrine (SUDAFED) 30 MG tablet Take 2 tablets by mouth As Needed for Congestion.      spironolactone (ALDACTONE) 25 MG tablet        No current facility-administered medications for this encounter.       --------------------------------------------------------------------------------  Assessment & Plan      Anesthesia Evaluation     Patient summary reviewed and Nursing notes reviewed   history of anesthetic complications:  PONV  NPO Solid Status: > 8 hours  NPO Liquid Status: > 2 hours    Pain impairs ability to perform ADLs: Exercise/Activity and Ambulation  Modalities previously tried to control pain with limited effectiveness within the last 4-6 weeks: Rest and OTC medications     Airway   Mallampati: II  TM distance: >3 FB  Neck ROM: full  Dental - normal exam     Pulmonary - normal exam    breath sounds clear to auscultation  (+) a smoker (8 pack years) Former,  Cardiovascular - normal exam    Rhythm: regular  Rate: normal    (+) dysrhythmias (SVT) Tachycardia, NUGENT  (-) angina, orthopnea, PND      Neuro/Psych  (+) psychiatric history Anxiety and Depression  GI/Hepatic/Renal/Endo    (+) morbid obesity, GERD, diabetes mellitus (Prediabetes)    Musculoskeletal     Abdominal    Substance History - negative use     OB/GYN negative ob/gyn ROS         Other   arthritis,                  Diagnosis and Plan    Treatment Plan  ASA 3      Procedures: Lumbar Epidural Steroid Injection(LESI), With fluoroscopy,      Anesthetic plan and risks discussed with patient.        Alternative management options include physical therapy, ice baths, medical management with nonsteroidal anti-inflammatory medications or narcotics, chiropractic manipulation and surgical intervention.    1.  Lumbar epidural steroid injections.  If pain control is acceptable after 1 or 2 injections, it was discussed with the patient that they may return for the subsequent injections if and when their pain returns.  The risks were  discussed with the patient including failure of relief, worsening pain, Headache (post dural puncture headache), bleeding (epidural hematoma) and infection (epidural abscess or skin infection).  2.  Physical therapy exercises at home as prescribed by physical therapy or from the pain clinic handout (given to the patient).  Continuation of these exercises every day, or multiple times per week, even when the patient has good pain relief, was stressed to the patient as a preventative measure to decrease the frequency and severity of future pain episodes.  3.  Continue pain medicines as already prescribed.  If patient not currently taking any, it is recommended to begin Acetaminophen 1000 mg po q 8 hours.  If other medicines containing Acetaminophen are currently prescribed, maintain daily dose at 3000 mg.    4.  If they can tolerate NSAIDS, it is recommended to take Ibuprofen 600 mg po q 6 hours for 7 days during pain exacerbations.  Alternatively, they may substitute an NSAID of their choice (e.g. Aleve).  This may be taken at the same time as Acetaminophen.  5.  Heat and ice to the affected area as tolerated for pain control.  It was discussed that heating pads can cause burns.  6.  Daily low impact exercise such as walking or water exercise was recommended to maintain overall health and aid in weight control.   7.  Follow up as needed for subsequent injections.  8.  Patient was counseled to abstain from tobacco products.      Diagnosis     * Intervertebral disc disorders with radiculopathy, lumbar region [M51.16]

## 2023-11-16 NOTE — H&P (VIEW-ONLY)
[facility]    History and Physical    Patient Name: Ce YEBOAH [facility]  :  1970  MRN:  [id number]  Date of Admission: 2023    Subjective     Patient is a 52 y.o. female presents with chief complaint of acute on chronic, intermittent, moderate low back and right lower extremity  pain.  Onset of symptoms was gradual starting 8 weeks ago.  Symptoms are associated/aggravated by activity or walking for more than a few minutes. Symptoms improve with pain medication and rest.  Pain scale from 0-10, she rates her pain as a 1 while at rest and 4 with activity.  She describes the pain as sharp and stabbing in nature.  She was referred to the pain clinic for a series of lumbar epidural steroid injections.    Her most recent lumbar MRI results are as follows:    FINDINGS: The tip of the conus lies at L1 and the lower thoracic spine  demonstrates mild disc narrowing but no stenosis. The distal spinal cord  is normal in caliber and signal.     At T12-L1, the disc is narrowed. The canal and foramina are patent.     At L1-2, there is disc narrowing with a ridge of endplate osteophyte and  minimal disc bulge which indent the thecal sac slightly. The canal and  foramina are widely patent.     At L2-3 and L3-4, there is disc narrowing and mild hypertrophic facet  change. The canal and foramina are patent.     At L4-5, there is disc narrowing with vacuum phenomenon and degenerative  endplate signal change laterally on the right. There is a chronic right  posterolateral disc extrusion which creates right lateral recess  stenosis and abuts and displaces the exiting right L4 nerve root. The  canal, lateral recesses, and the left foramen are patent.     At L5-S1, the disc is narrowed with a tiny left subarticular disc  protrusion which mildly narrows the left lateral recess. 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     The elements of the cauda equina otherwise appear  normal. Paraspinous  musculature is deconditioned. Visualized retroperitoneal structures  appear normal.    The following portions of the patients history were reviewed and updated as appropriate: current medications, allergies, past medical history, past surgical history, past family history, past social history, and problem list                Objective     Past Medical History:   Past Medical History:   Diagnosis Date    Allergic     Anemia     Ankle swelling     Anxiety and depression     Arthritis     Back problem     LOW BACK PAIN, DISC ISSUES    Cervical disc disorder     GERD (gastroesophageal reflux disease)     Knee problem     Knee swelling     Left knee pain     Low back pain     Lumbosacral disc disease     Methicillin resistant Staph aureus culture positive     HX 10-15 YEARS AGO, NASAL, TREATED [facility]    Obesity     PONV (postoperative nausea and vomiting)     Prediabetes     S/P cervical spinal fusion     Shortness of breath on exertion     FROM DECONDITIONING FROM KNEE PAIN    SVT (supraventricular tachycardia)     HX, ANXIETY RELATED PER PT    Tendinitis of right elbow      Past Surgical History:   Past Surgical History:   Procedure Laterality Date    ADENOIDECTOMY      ANTERIOR CERVICAL DISCECTOMY W/ FUSION      C6-7    APPENDECTOMY  2012     SECTION      DIAGNOSTIC LAPAROSCOPY, SALPINGO OOPHORECTOMY LAPAROSCOPIC  2008    ENDOMETRIAL ABLATION  10/2012    EPIDURAL BLOCK      HYSTERECTOMY      LAPAROSCOPIC CHOLECYSTECTOMY  2005    LASIK  2000    NECK SURGERY      [facility]    OOPHORECTOMY      TONSILLECTOMY      TOTAL KNEE ARTHROPLASTY Left 2023    Procedure: TOTAL KNEE ARTHROPLASTY;  Surgeon: James Coy MD;  [facility];  Service: Orthopedics;  Laterality: Left;    VENTRAL HERNIA REPAIR  2016     Family History:   Family History   Problem Relation Age of Onset    Breast cancer Mother     Stroke Mother      Hypertension Mother     Hyperlipidemia Mother     Cancer Mother         Breast ca    Diabetes Mother     Mental illness Mother     Depression Mother     Osteoporosis Mother     Hypertension Father     Hyperlipidemia Father     Drug abuse Sister     Depression Sister     Mental illness Sister     ADD / ADHD Son     Asthma Son     Heart disease Maternal Grandmother     Colon cancer Maternal Grandfather     Throat cancer Paternal Grandfather     Malig Hyperthermia Neg Hx      Social History:   Social History     Socioeconomic History    Marital status: Single   Tobacco Use    Smoking status: Former     Packs/day: 1.00     Years: 8.00     Additional pack years: 0.00     Total pack years: 8.00     Types: Cigarettes     Quit date: 2015     Years since quittin.8     Passive exposure: Past    Smokeless tobacco: Never   Vaping Use    Vaping Use: Never used   Substance and Sexual Activity    Alcohol use: Not Currently     Comment: rarely    Drug use: Never    Sexual activity: Not Currently     Partners: Male     Birth control/protection: None       Vital Signs Range for the last 24 hours  Temperature: Temp:  [36.7 °C (98 °F)] 36.7 °C (98 °F)   Temp Source: Temp src: Oral   BP: BP: (127)/(88) 127/88   Pulse: Heart Rate:  [95] 95   Respirations: Resp:  [16] 16   SPO2: SpO2:  [98 %] 98 %   O2 Amount (l/min):     O2 Devices Device (Oxygen Therapy): room air   Weight:           --------------------------------------------------------------------------------    Current Outpatient Medications   Medication Sig Dispense Refill    azelaic acid (AZELEX) 15 % gel Apply a small amount to affected area twice a day 50 g 3    cyclobenzaprine (FLEXERIL) 10 MG tablet Take 1 tablet by mouth 3 (Three) Times a Day As Needed for Muscle Spasms. 90 tablet 0    FLUoxetine (PROzac) 40 MG capsule Take 1 capsule by mouth Daily. 90 capsule 1    ibuprofen (ADVIL,MOTRIN) 800 MG tablet Take 1 tablet by mouth Every 8 (Eight) Hours As Needed for Mild  Pain. 60 tablet 1    meloxicam (Mobic) 7.5 MG tablet Take 1 tablet by mouth Daily. 90 tablet 0    ondansetron (Zofran) 4 MG tablet Take 1 tablet by mouth Every 8 (Eight) Hours As Needed for Nausea or Vomiting for up to 10 doses. 10 tablet 0    pantoprazole (Protonix) 40 MG EC tablet Take 1 tablet by mouth Daily As Needed (dyspepsia). 90 tablet 1    Semaglutide-Weight Management (Wegovy) 1.7 MG/0.75ML solution auto-injector Inject 0.75 mL under the skin into the appropriate area as directed 1 (One) Time Per Week. 3 mL 0    spironolactone-hydrochlorothiazide (Aldactazide) 25-25 MG tablet Take 1 tablet by mouth Daily. 90 tablet 1    acetaminophen (TYLENOL) 500 MG tablet Take 1 tablet by mouth Every 6 (Six) Hours As Needed for Mild Pain.      albuterol sulfate HFA (ProAir HFA) 108 (90 Base) MCG/ACT inhaler Inhale 1 puff Every 4 (Four) Hours As Needed for Wheezing or Shortness of Air. 8.5 g 3    aspirin 81 MG EC tablet Take 1 tablet by mouth 2 times a day for 14 days, then take 1 tablet by mouth daily for 28 days. 60 tablet 0    azelaic acid (AZELEX) 15 % gel Apply a small amount topically to the appropriate area as directed 2 (Two) Times a Day. (Patient not taking: Reported on 11/2/2023) 50 g 11    cephalexin (KEFLEX) 500 MG capsule Take all 4 capsules by mouth one hour prior to procedure 4 capsule 5    cetirizine (zyrTEC) 10 MG tablet Take 1 tablet by mouth Daily As Needed for Allergies.      fluticasone (FLONASE) 50 MCG/ACT nasal spray 2 sprays into the nostril(s) as directed by provider Daily As Needed for Allergies.      hydroCHLOROthiazide (HYDRODIURIL) 25 MG tablet Take 1 tablet every day by oral route for 30 days.      HYDROcodone-acetaminophen (NORCO) 7.5-325 MG per tablet Take 1 tablet by mouth Every 4 (Four) Hours As Needed for Moderate Pain or Severe Pain. (Patient not taking: Reported on 11/2/2023) 20 tablet 0    Phendimetrazine Tartrate  MG capsule sustained-release 24 hr Take 1 capsule every day by  oral route.      pseudoephedrine (SUDAFED) 30 MG tablet Take 2 tablets by mouth As Needed for Congestion.      spironolactone (ALDACTONE) 25 MG tablet        No current facility-administered medications for this encounter.       --------------------------------------------------------------------------------  Assessment & Plan      Anesthesia Evaluation     Patient summary reviewed and Nursing notes reviewed   history of anesthetic complications:  PONV  NPO Solid Status: > 8 hours  NPO Liquid Status: > 2 hours    Pain impairs ability to perform ADLs: Exercise/Activity and Ambulation  Modalities previously tried to control pain with limited effectiveness within the last 4-6 weeks: Rest and OTC medications     Airway   Mallampati: II  TM distance: >3 FB  Neck ROM: full  Dental - normal exam     Pulmonary - normal exam    breath sounds clear to auscultation  (+) a smoker (8 pack years) Former,  Cardiovascular - normal exam    Rhythm: regular  Rate: normal    (+) dysrhythmias (SVT) Tachycardia, NUGENT  (-) angina, orthopnea, PND      Neuro/Psych  (+) psychiatric history Anxiety and Depression  GI/Hepatic/Renal/Endo    (+) morbid obesity, GERD, diabetes mellitus (Prediabetes)    Musculoskeletal     Abdominal    Substance History - negative use     OB/GYN negative ob/gyn ROS         Other   arthritis,                  Diagnosis and Plan    Treatment Plan  ASA 3      Procedures: Lumbar Epidural Steroid Injection(LESI), With fluoroscopy,      Anesthetic plan and risks discussed with patient.        Alternative management options include physical therapy, ice baths, medical management with nonsteroidal anti-inflammatory medications or narcotics, chiropractic manipulation and surgical intervention.    1.  Lumbar epidural steroid injections.  If pain control is acceptable after 1 or 2 injections, it was discussed with the patient that they may return for the subsequent injections if and when their pain returns.  The risks were  discussed with the patient including failure of relief, worsening pain, Headache (post dural puncture headache), bleeding (epidural hematoma) and infection (epidural abscess or skin infection).  2.  Physical therapy exercises at home as prescribed by physical therapy or from the pain clinic handout (given to the patient).  Continuation of these exercises every day, or multiple times per week, even when the patient has good pain relief, was stressed to the patient as a preventative measure to decrease the frequency and severity of future pain episodes.  3.  Continue pain medicines as already prescribed.  If patient not currently taking any, it is recommended to begin Acetaminophen 1000 mg po q 8 hours.  If other medicines containing Acetaminophen are currently prescribed, maintain daily dose at 3000 mg.    4.  If they can tolerate NSAIDS, it is recommended to take Ibuprofen 600 mg po q 6 hours for 7 days during pain exacerbations.  Alternatively, they may substitute an NSAID of their choice (e.g. Aleve).  This may be taken at the same time as Acetaminophen.  5.  Heat and ice to the affected area as tolerated for pain control.  It was discussed that heating pads can cause burns.  6.  Daily low impact exercise such as walking or water exercise was recommended to maintain overall health and aid in weight control.   7.  Follow up as needed for subsequent injections.  8.  Patient was counseled to abstain from tobacco products.      Diagnosis     * Intervertebral disc disorders with radiculopathy, lumbar region [M51.16]

## 2023-11-16 NOTE — DISCHARGE INSTRUCTIONS

## 2023-11-16 NOTE — ANESTHESIA PROCEDURE NOTES
PAIN Epidural block      Patient reassessed immediately prior to procedure    Patient location during procedure: pain clinic  Start Time: 11/16/2023 8:27 AM  Stop Time: 11/16/2023 8:40 AM  Indication:procedure for pain  Performed By  Anesthesiologist: Chavez Candelario MD  Preanesthetic Checklist  Completed: patient identified, site marked, risks and benefits discussed, surgical consent, monitors and equipment checked, pre-op evaluation and timeout performed  Additional Notes  Post-Op Diagnosis Codes:     * Intervertebral disc disorders with radiculopathy, lumbar region [M51.16]    The patient was observed in recovery with no evidence of neurological deficits or other problems. All questions were answered. The patient was discharged with appropriate instructions.  Prep:  Pt Position:prone  Sterile Tech:cap, gloves, mask and sterile barrier  Prep:chlorhexidine gluconate and isopropyl alcohol  Monitoring:blood pressure monitoring, continuous pulse oximetry and EKG  Procedure:Sedation: no     Approach:right paramedian  Guidance: fluoroscopy  Location:lumbar  Level:L5-S1 (Interlaminar)  Needle Type:Tuohy  Needle Gauge:20 G  Aspiration:negative  Medications:  Preservative Free Saline:3mL  Isovue:2mL  Comments:Isovue dye spread was consistent with epidural placement.    Preservative-free dexamethasone 10 mg  Post Assessment:  Dressing:occlusive dressing applied  Pt Tolerance:patient tolerated the procedure well with no apparent complications  Complications:no

## 2023-11-21 ENCOUNTER — HOSPITAL ENCOUNTER (OUTPATIENT)
Dept: CARDIOLOGY | Facility: HOSPITAL | Age: 53
Discharge: HOME | End: 2023-11-21
Admitting: INTERNAL MEDICINE
Payer: COMMERCIAL

## 2023-11-21 DIAGNOSIS — R73.03 PREDIABETES: ICD-10-CM

## 2023-11-21 DIAGNOSIS — K21.9 GASTROESOPHAGEAL REFLUX DISEASE WITHOUT ESOPHAGITIS: Primary | ICD-10-CM

## 2023-11-21 LAB
ALBUMIN SERPL-MCNC: 4.4 G/DL (ref 3.5–5.2)
ALBUMIN/GLOB SERPL: 1.8 G/DL
ALP SERPL-CCNC: 86 U/L (ref 39–117)
ALT SERPL W P-5'-P-CCNC: 25 U/L (ref 1–33)
ANION GAP SERPL CALCULATED.3IONS-SCNC: 10.3 MMOL/L (ref 5–15)
AST SERPL-CCNC: 18 U/L (ref 1–32)
BILIRUB SERPL-MCNC: 0.3 MG/DL (ref 0–1.2)
BUN SERPL-MCNC: 24 MG/DL (ref 6–20)
BUN/CREAT SERPL: 26.1 (ref 7–25)
CALCIUM SPEC-SCNC: 9.6 MG/DL (ref 8.6–10.5)
CHLORIDE SERPL-SCNC: 100 MMOL/L (ref 98–107)
CHOLEST SERPL-MCNC: 208 MG/DL (ref 0–200)
CO2 SERPL-SCNC: 26.7 MMOL/L (ref 22–29)
CREAT SERPL-MCNC: 0.92 MG/DL (ref 0.57–1)
EGFRCR SERPLBLD CKD-EPI 2021: 74.1 ML/MIN/1.73
GLOBULIN UR ELPH-MCNC: 2.4 GM/DL
GLUCOSE SERPL-MCNC: 115 MG/DL (ref 65–99)
HBA1C MFR BLD: 6 % (ref 4.8–5.6)
HDLC SERPL QL: 3.3
HDLC SERPL-MCNC: 63 MG/DL (ref 40–60)
LDLC SERPL CALC-MCNC: 117 MG/DL (ref 0–100)
POTASSIUM SERPL-SCNC: 3.4 MMOL/L (ref 3.5–5.2)
PROT SERPL-MCNC: 6.8 G/DL (ref 6–8.5)
SODIUM SERPL-SCNC: 137 MMOL/L (ref 136–145)
TRIGL SERPL-MCNC: 162 MG/DL (ref 0–150)
VLDLC SERPL-MCNC: 28 MG/DL (ref 5–40)

## 2023-11-21 PROCEDURE — 83036 HEMOGLOBIN GLYCOSYLATED A1C: CPT

## 2023-11-21 PROCEDURE — 80053 COMPREHEN METABOLIC PANEL: CPT

## 2023-11-21 PROCEDURE — 80061 LIPID PANEL: CPT

## 2023-11-21 PROCEDURE — 36415 COLL VENOUS BLD VENIPUNCTURE: CPT

## 2023-11-22 ENCOUNTER — OFFICE VISIT (OUTPATIENT)
Dept: INTERNAL MEDICINE | Facility: CLINIC | Age: 53
End: 2023-11-22
Payer: COMMERCIAL

## 2023-11-22 VITALS
HEART RATE: 78 BPM | SYSTOLIC BLOOD PRESSURE: 148 MMHG | WEIGHT: 244.6 LBS | DIASTOLIC BLOOD PRESSURE: 94 MMHG | BODY MASS INDEX: 43.34 KG/M2 | HEIGHT: 63 IN

## 2023-11-22 DIAGNOSIS — R03.0 ELEVATED BP WITHOUT DIAGNOSIS OF HYPERTENSION: Chronic | ICD-10-CM

## 2023-11-22 DIAGNOSIS — F33.42 RECURRENT MAJOR DEPRESSIVE DISORDER, IN FULL REMISSION: Primary | Chronic | ICD-10-CM

## 2023-11-22 DIAGNOSIS — R73.03 PREDIABETES: Chronic | ICD-10-CM

## 2023-11-22 DIAGNOSIS — F41.9 ANXIETY: ICD-10-CM

## 2023-11-22 DIAGNOSIS — E66.01 CLASS 3 SEVERE OBESITY DUE TO EXCESS CALORIES WITH SERIOUS COMORBIDITY AND BODY MASS INDEX (BMI) OF 40.0 TO 44.9 IN ADULT: ICD-10-CM

## 2023-11-22 PROCEDURE — 99214 OFFICE O/P EST MOD 30 MIN: CPT | Performed by: INTERNAL MEDICINE

## 2023-11-22 RX ORDER — MELOXICAM 7.5 MG/1
7.5 TABLET ORAL DAILY
Qty: 90 TABLET | Refills: 0 | Status: SHIPPED | OUTPATIENT
Start: 2023-11-22

## 2023-11-22 RX ORDER — FLUOXETINE HYDROCHLORIDE 20 MG/1
20 CAPSULE ORAL DAILY
Qty: 30 CAPSULE | Refills: 1 | Status: SHIPPED | OUTPATIENT
Start: 2023-11-22

## 2023-11-22 NOTE — PROGRESS NOTES
"Chief Complaint  Weight Check    Subjective        Ce Lebron presents to [facility]  History of Present Illness she has been taking Wegovy about 4 months- she initially lost 10 lbs but now is losing/gaining the same 4 lbs.  At this dose, she is finally feeling some appetite suppression. To go up to 2.4 mg this weekend.   She is moving her family to [place] to help with aging parents.  Feels very overwhelmed. Starting to have more anxiety episodes- doesn't feel   Few BP readings have been highest 130/86    Objective   Vital Signs:  /94   Pulse 78   Ht 160 cm (62.99\")   Wt 111 kg (244 lb 9.6 oz)   BMI 43.34 kg/m²   Estimated body mass index is 43.34 kg/m² as calculated from the following:    Height as of this encounter: 160 cm (62.99\").    Weight as of this encounter: 111 kg (244 lb 9.6 oz).               Physical Exam  Constitutional:       Appearance: She is obese.   Cardiovascular:      Rate and Rhythm: Normal rate and regular rhythm.   Musculoskeletal:      Right lower leg: No edema.      Left lower leg: No edema.        Result Review :  The following data was reviewed by: Chhaya Vallejo MD on 11/22/2023:  CMP          4/20/2023    08:45 11/21/2023    14:36   CMP   Glucose 112  115    BUN 23  24    Creatinine 0.79  0.92    EGFR 89.0  74.1    Sodium 140  137    Potassium 3.4  3.4    Chloride 104  100    Calcium 8.9  9.6    Total Protein  6.8    Albumin  4.4    Globulin  2.4    Total Bilirubin  0.3    Alkaline Phosphatase  86    AST (SGOT)  18    ALT (SGPT)  25    Albumin/Globulin Ratio  1.8    BUN/Creatinine Ratio 29.1  26.1    Anion Gap 10.0  10.3      Lipid Panel          11/21/2023    14:36   Lipid Panel   Total Cholesterol 208    Triglycerides 162    HDL Cholesterol 63    VLDL Cholesterol 28    LDL Cholesterol  117      Most Recent A1C          11/21/2023    14:36   HGBA1C Most Recent   Hemoglobin A1C 6.00                   Assessment and Plan   Diagnoses and all orders " for this visit:    1. Recurrent major depressive disorder, in full remission (Primary)  Comments:  wean down Prozac to off.  Start Trintellix 10 mg, increase to 20 at f/u if tolerating.  Orders:  -     Vortioxetine HBr (Trintellix) 10 MG tablet tablet; Take 1 tablet by mouth Daily With Breakfast.  Dispense: 30 tablet; Refill: 0    2. Prediabetes  Comments:  A1C improved- due to semaglutide.  No change for now.  Orders:  -     Semaglutide-Weight Management 2.4 MG/0.75ML solution auto-injector; Inject 2.4 mg under the skin into the appropriate area as directed 1 (One) Time Per Week.  Dispense: 9 mL; Refill: 1    3. Anxiety  Comments:  as above  Orders:  -     FLUoxetine (PROzac) 20 MG capsule; Take 1 capsule by mouth Daily.  Dispense: 30 capsule; Refill: 1    4. Elevated BP without diagnosis of hypertension  Comments:  better at work- will recheck later today and reassess if remains elevated.    5. Class 3 severe obesity due to excess calories with serious comorbidity and body mass index (BMI) of 40.0 to 44.9 in adult  Comments:  increase Wegovy- reminded insurance often wats proof of wt loss so will recheck in 1 month- hopeful for more change.             Follow Up   Return in about 4 weeks (around 12/20/2023) for Recheck.  Patient was given instructions and counseling regarding her condition or for health maintenance advice. Please see specific information pulled into the AVS if appropriate.

## 2023-11-30 RX ORDER — ONDANSETRON 4 MG/1
4 TABLET, FILM COATED ORAL EVERY 8 HOURS PRN
Qty: 10 TABLET | Refills: 0 | Status: CANCELLED | OUTPATIENT
Start: 2023-11-30

## 2023-12-01 RX ORDER — ONDANSETRON 4 MG/1
4 TABLET, FILM COATED ORAL EVERY 8 HOURS PRN
Qty: 30 TABLET | Refills: 0 | Status: SHIPPED | OUTPATIENT
Start: 2023-12-01

## 2023-12-05 ENCOUNTER — HOSPITAL ENCOUNTER (OUTPATIENT)
Dept: PAIN MEDICINE | Facility: HOSPITAL | Age: 53
Discharge: HOME | End: 2023-12-05
Payer: COMMERCIAL

## 2023-12-05 ENCOUNTER — ANESTHESIA (OUTPATIENT)
Dept: PAIN MEDICINE | Facility: HOSPITAL | Age: 53
End: 2023-12-05
Payer: COMMERCIAL

## 2023-12-05 ENCOUNTER — HOSPITAL ENCOUNTER (OUTPATIENT)
Dept: GENERAL RADIOLOGY | Facility: HOSPITAL | Age: 53
Discharge: HOME | End: 2023-12-05
Payer: COMMERCIAL

## 2023-12-05 ENCOUNTER — ANESTHESIA EVENT (OUTPATIENT)
Dept: PAIN MEDICINE | Facility: HOSPITAL | Age: 53
End: 2023-12-05
Payer: COMMERCIAL

## 2023-12-05 VITALS
RESPIRATION RATE: 16 BRPM | HEART RATE: 86 BPM | OXYGEN SATURATION: 94 % | DIASTOLIC BLOOD PRESSURE: 78 MMHG | TEMPERATURE: 97.7 F | SYSTOLIC BLOOD PRESSURE: 120 MMHG

## 2023-12-05 DIAGNOSIS — R52 PAIN: ICD-10-CM

## 2023-12-05 DIAGNOSIS — M54.16 LUMBAR RADICULOPATHY: ICD-10-CM

## 2023-12-05 PROCEDURE — 25010000002 METHYLPREDNISOLONE PER 80 MG: Performed by: ANESTHESIOLOGY

## 2023-12-05 PROCEDURE — 77003 FLUOROGUIDE FOR SPINE INJECT: CPT

## 2023-12-05 RX ORDER — IOPAMIDOL 408 MG/ML
10 INJECTION, SOLUTION INTRATHECAL
Status: DISCONTINUED | OUTPATIENT
Start: 2023-12-05 | End: 2023-12-06 | Stop reason: HOSPADM

## 2023-12-05 RX ORDER — MIDAZOLAM HYDROCHLORIDE 1 MG/ML
1 INJECTION INTRAMUSCULAR; INTRAVENOUS ONCE AS NEEDED
Status: DISCONTINUED | OUTPATIENT
Start: 2023-12-05 | End: 2023-12-06 | Stop reason: HOSPADM

## 2023-12-05 RX ORDER — LIDOCAINE HYDROCHLORIDE 10 MG/ML
1 INJECTION, SOLUTION INFILTRATION; PERINEURAL ONCE
Status: DISCONTINUED | OUTPATIENT
Start: 2023-12-05 | End: 2023-12-06 | Stop reason: HOSPADM

## 2023-12-05 RX ORDER — METHYLPREDNISOLONE ACETATE 80 MG/ML
80 INJECTION, SUSPENSION INTRA-ARTICULAR; INTRALESIONAL; INTRAMUSCULAR; SOFT TISSUE ONCE
Status: COMPLETED | OUTPATIENT
Start: 2023-12-05 | End: 2023-12-05

## 2023-12-05 RX ORDER — FENTANYL CITRATE 50 UG/ML
50 INJECTION, SOLUTION INTRAMUSCULAR; INTRAVENOUS ONCE
Status: DISCONTINUED | OUTPATIENT
Start: 2023-12-05 | End: 2023-12-06 | Stop reason: HOSPADM

## 2023-12-05 RX ADMIN — METHYLPREDNISOLONE ACETATE 80 MG: 80 INJECTION, SUSPENSION INTRA-ARTICULAR; INTRALESIONAL; INTRAMUSCULAR; SOFT TISSUE at 08:13

## 2023-12-05 NOTE — DISCHARGE INSTRUCTIONS

## 2023-12-05 NOTE — INTERVAL H&P NOTE
[facility]  H&P reviewed. No changes since last visit.  Patient states   % improvement since the last procedure/injection.  Pain has just started to return.  Pt is moving & feels that this epidural is needed to  help get her fully functional.      Diagnosis     * Intervertebral disc disorder with radiculopathy of lumbar region [M51.16]      Airway assessed since last visit. Airway class equals: 2.

## 2023-12-19 ENCOUNTER — OFFICE VISIT (OUTPATIENT)
Dept: INTERNAL MEDICINE | Facility: CLINIC | Age: 53
End: 2023-12-19
Payer: COMMERCIAL

## 2023-12-19 VITALS
BODY MASS INDEX: 43.59 KG/M2 | HEART RATE: 80 BPM | SYSTOLIC BLOOD PRESSURE: 132 MMHG | HEIGHT: 63 IN | WEIGHT: 246 LBS | DIASTOLIC BLOOD PRESSURE: 84 MMHG

## 2023-12-19 DIAGNOSIS — F33.0 MILD EPISODE OF RECURRENT MAJOR DEPRESSIVE DISORDER: Primary | ICD-10-CM

## 2023-12-19 PROBLEM — F33.9 EPISODE OF RECURRENT MAJOR DEPRESSIVE DISORDER: Status: ACTIVE | Noted: 2020-07-22

## 2023-12-19 PROCEDURE — 99214 OFFICE O/P EST MOD 30 MIN: CPT | Performed by: INTERNAL MEDICINE

## 2023-12-19 NOTE — PROGRESS NOTES
"Chief Complaint  Depression    Subjective        Ce Lebron presents to [facility]  History of Present Illness here to f/u Trintellix- she feels that it has helped her depression but causes significant nausea.  This has not gotten better. She is treating with Zofran. She is still taking the Prozac.    Objective   Vital Signs:  /84   Pulse 80   Ht 160 cm (62.99\")   Wt 112 kg (246 lb)   BMI 43.59 kg/m²   Estimated body mass index is 43.59 kg/m² as calculated from the following:    Height as of this encounter: 160 cm (62.99\").    Weight as of this encounter: 112 kg (246 lb).               Physical Exam  Constitutional:       Appearance: Normal appearance.   Psychiatric:         Mood and Affect: Mood normal.         Thought Content: Thought content normal.        Result Review :                   Assessment and Plan   Diagnoses and all orders for this visit:    1. Mild episode of recurrent major depressive disorder (Primary)  Comments:  same Trintellix- d/c fluoxetine- take at HS with Zofran- If unable to tolerate, call in a week or so.             Follow Up   No follow-ups on file.  Patient was given instructions and counseling regarding her condition or for health maintenance advice. Please see specific information pulled into the AVS if appropriate.         "

## 2023-12-26 DIAGNOSIS — F33.42 RECURRENT MAJOR DEPRESSIVE DISORDER, IN FULL REMISSION: Chronic | ICD-10-CM

## 2024-01-04 ENCOUNTER — OFFICE VISIT (OUTPATIENT)
Dept: ORTHOPEDIC SURGERY | Facility: CLINIC | Age: 54
End: 2024-01-04
Payer: COMMERCIAL

## 2024-01-04 ENCOUNTER — PREP FOR SURGERY (OUTPATIENT)
Dept: OTHER | Facility: HOSPITAL | Age: 54
End: 2024-01-04
Payer: COMMERCIAL

## 2024-01-04 VITALS — BODY MASS INDEX: 44.16 KG/M2 | HEIGHT: 63 IN | TEMPERATURE: 98.4 F | WEIGHT: 249.2 LBS

## 2024-01-04 DIAGNOSIS — M17.11 PRIMARY OSTEOARTHRITIS OF RIGHT KNEE: Primary | ICD-10-CM

## 2024-01-04 PROCEDURE — 99214 OFFICE O/P EST MOD 30 MIN: CPT | Performed by: NURSE PRACTITIONER

## 2024-01-04 RX ORDER — PREGABALIN 75 MG/1
150 CAPSULE ORAL ONCE
OUTPATIENT
Start: 2024-02-28 | End: 2024-01-04

## 2024-01-04 RX ORDER — MELOXICAM 15 MG/1
15 TABLET ORAL ONCE
OUTPATIENT
Start: 2024-02-28 | End: 2024-01-04

## 2024-01-04 RX ORDER — CHLORHEXIDINE GLUCONATE 500 MG/1
CLOTH TOPICAL 2 TIMES DAILY
OUTPATIENT
Start: 2024-01-04

## 2024-01-04 RX ORDER — CEFAZOLIN SODIUM 2 G/100ML
2 INJECTION, SOLUTION INTRAVENOUS ONCE
OUTPATIENT
Start: 2024-02-28 | End: 2024-01-04

## 2024-01-04 NOTE — PROGRESS NOTES
Patient: Ce Lebron  YOB: 1970 53 y.o. female  Medical Record Number: [id number]    Chief Complaints:   Chief Complaint   Patient presents with    Right Knee - Follow-up       History of Present Illness:Ce Lebron is a 53 y.o. female who presents with complaints of severe right knee pain, she has tried and failed conservative measures including injections, physical therapy, anti-inflammatories, she would like to proceed with surgery    Allergies:   Allergies   Allergen Reactions    Avocado Anaphylaxis    Latex Anaphylaxis, Itching, Dermatitis and Rash     THROAT SWELLING    Sulfa Antibiotics Itching    Adhesive Tape Swelling     Gets some redness and swelling       Medications:   Current Outpatient Medications   Medication Sig Dispense Refill    albuterol sulfate HFA (ProAir HFA) 108 (90 Base) MCG/ACT inhaler Inhale 1 puff Every 4 (Four) Hours As Needed for Wheezing or Shortness of Air. 8.5 g 3    aspirin 81 MG EC tablet Take 1 tablet by mouth 2 times a day for 14 days, then take 1 tablet by mouth daily for 28 days. 60 tablet 0    azelaic acid (AZELEX) 15 % gel Apply a small amount topically to the appropriate area as directed 2 (Two) Times a Day. 50 g 11    cephalexin (KEFLEX) 500 MG capsule Take all 4 capsules by mouth one hour prior to procedure 4 capsule 5    cyclobenzaprine (FLEXERIL) 10 MG tablet Take 1 tablet by mouth 3 (Three) Times a Day As Needed for Muscle Spasms. 90 tablet 0    ibuprofen (ADVIL,MOTRIN) 800 MG tablet Take 1 tablet by mouth Every 8 (Eight) Hours As Needed for Mild Pain. 60 tablet 1    meloxicam (Mobic) 7.5 MG tablet Take 1 tablet by mouth Daily. 90 tablet 0    ondansetron (Zofran) 4 MG tablet Take 1 tablet by mouth Every 8 (Eight) Hours As Needed for Nausea or Vomiting for up to 10 doses. 30 tablet 0    pantoprazole (Protonix) 40 MG EC tablet Take 1 tablet by mouth Daily As Needed (dyspepsia). 90 tablet 1    spironolactone-hydrochlorothiazide (Aldactazide) 25-25 MG  "tablet Take 1 tablet by mouth Daily. 90 tablet 1    Vortioxetine HBr (Trintellix) 10 MG tablet tablet Take 1 tablet by mouth Daily With Breakfast. 30 tablet 0    FLUoxetine (PROzac) 20 MG capsule Take 1 capsule by mouth Daily. 30 capsule 1     No current facility-administered medications for this visit.         The following portions of the patient's history were reviewed and updated as appropriate: allergies, current medications, past family history, past medical history, past social history, past surgical history and problem list.    Review of Systems:   14 point review of systems was performed. All systems negative except pertinent positives/negatives listed in HPI above    Physical Exam:   Vitals:    01/04/24 0821   Temp: 98.4 °F (36.9 °C)   TempSrc: Temporal   Weight: 113 kg (249 lb 3.2 oz)   Height: 160 cm (63\")   PainSc:   3   PainLoc: Knee       General: A and O x 3, ASA, NAD   Skin clear no unusual lesions noted  Right knee patient has no appreciable effusion 116 degrees flexion neutral in extension with a positive Ignacio negative Lockman calf soft and nontender       Radiology:  Xrays 3views (ap,lateral, sunrise) right knee were reviewed that were done previously show bone-on-bone end-stage osteoarthritis with cyst and spur formation    Assessment/Plan: End-stage osteoarthritis right knee with increasing pain    Patient and I discussed options, she would like to proceed with right total knee replacement overnight stay.  She will be scheduled for the end of February, I have already discussed this with surgery scheduler, the patient will be added.  Continuation of conservative management vs. TKA discussed.  The patient wishes to proceed with total knee replacement.  At this point the patient has failed the full compliment of conservative treatment and stating complete understanding of the risks/benefits/ anternatives wishes to proceed with surgical treatment.    Risk and benefits of surgery were reviewed.  " Including, but not limited to, blood clots or pulmonary embolism, anesthesia risk, infection, fracture, skin/leg numbness, persistent pain/crepitance/popping/catching, failure of the implant, need for future surgeries, hematoma, possible nerve or blood vessel injury, need for transfusion, and potential risk of stroke,heart attack or death, among others.  The patient understands and wishes to proceed.     It was explained that if tissue has been repaired or reconstructed, there is also an increased chance of failure which may require further management.  Following the completion of the discussion, the patient expressed understanding of this planned course of care, all their questions were answered and consent will be obtained preoperatively.    Operative Plan: Smith and Nephhumberto Oxinium Total Knee Replacement an overnight stay with home health rehab       Ana Adkins, APRN  1/4/2024

## 2024-01-11 RX ORDER — ALBUTEROL SULFATE 90 UG/1
1 AEROSOL, METERED RESPIRATORY (INHALATION) EVERY 4 HOURS PRN
Qty: 8.5 G | Refills: 3 | OUTPATIENT
Start: 2024-01-11

## 2024-01-13 RX ORDER — DESVENLAFAXINE SUCCINATE 50 MG/1
50 TABLET, EXTENDED RELEASE ORAL DAILY
Qty: 90 TABLET | Refills: 0 | Status: SHIPPED | OUTPATIENT
Start: 2024-01-13

## 2024-02-06 ENCOUNTER — TELEPHONE (OUTPATIENT)
Dept: ORTHOPEDIC SURGERY | Facility: CLINIC | Age: 54
End: 2024-02-06
Payer: COMMERCIAL

## 2024-02-06 RX ORDER — PANTOPRAZOLE SODIUM 40 MG/1
40 TABLET, DELAYED RELEASE ORAL DAILY PRN
Qty: 90 TABLET | Refills: 1 | Status: SHIPPED | OUTPATIENT
Start: 2024-02-06

## 2024-02-06 RX ORDER — DESVENLAFAXINE SUCCINATE 50 MG/1
50 TABLET, EXTENDED RELEASE ORAL DAILY
Qty: 90 TABLET | Refills: 0 | Status: SHIPPED | OUTPATIENT
Start: 2024-02-06

## 2024-02-06 NOTE — TELEPHONE ENCOUNTER
----- Message from NICK Osborn sent at 2/6/2024  4:59 PM EST -----  Regarding: FW: Rom-tech bike  Contact: [phone]        ----- Message -----  From: Ana Romero MA  Sent: 2/6/2024  10:49 AM EST  To: NICK Osborn  Subject: FW: Rom-tech bike                                  ----- Message -----  From: Ce Lebron  Sent: 2/6/2024  10:46 AM EST  To: [facility]  Subject: Rom-tech bike                                    Hi, I had my left knee replaced last May.  My right knee is scheduled for 2/28/24.  Last time I used that Rom-tech bike post op and  I would like to see if I can use that again if my insurance covers it.    Thank you!

## 2024-02-06 NOTE — TELEPHONE ENCOUNTER
Have advised the patient that she is on the list to have a ROM tech ordered to be used postoperatively

## 2024-02-15 ENCOUNTER — PRE-ADMISSION TESTING (OUTPATIENT)
Dept: PREADMISSION TESTING | Facility: HOSPITAL | Age: 54
End: 2024-02-15
Payer: COMMERCIAL

## 2024-02-15 VITALS
SYSTOLIC BLOOD PRESSURE: 142 MMHG | WEIGHT: 254 LBS | TEMPERATURE: 97.3 F | DIASTOLIC BLOOD PRESSURE: 99 MMHG | HEART RATE: 109 BPM | BODY MASS INDEX: 43.36 KG/M2 | HEIGHT: 64 IN | OXYGEN SATURATION: 98 % | RESPIRATION RATE: 20 BRPM

## 2024-02-15 LAB
ANION GAP SERPL CALCULATED.3IONS-SCNC: 13.4 MMOL/L (ref 5–15)
BUN SERPL-MCNC: 20 MG/DL (ref 6–20)
BUN/CREAT SERPL: 28.2 (ref 7–25)
CALCIUM SPEC-SCNC: 8.9 MG/DL (ref 8.6–10.5)
CHLORIDE SERPL-SCNC: 104 MMOL/L (ref 98–107)
CO2 SERPL-SCNC: 24.6 MMOL/L (ref 22–29)
CREAT SERPL-MCNC: 0.71 MG/DL (ref 0.57–1)
DEPRECATED RDW RBC AUTO: 38.3 FL (ref 37–54)
EGFRCR SERPLBLD CKD-EPI 2021: 100.6 ML/MIN/1.73
ERYTHROCYTE [DISTWIDTH] IN BLOOD BY AUTOMATED COUNT: 13.4 % (ref 12.3–15.4)
GLUCOSE SERPL-MCNC: 125 MG/DL (ref 65–99)
HCT VFR BLD AUTO: 39.6 % (ref 34–46.6)
HGB BLD-MCNC: 12.3 G/DL (ref 12–15.9)
MCH RBC QN AUTO: 24.6 PG (ref 26.6–33)
MCHC RBC AUTO-ENTMCNC: 31.1 G/DL (ref 31.5–35.7)
MCV RBC AUTO: 79.4 FL (ref 79–97)
PLATELET # BLD AUTO: 401 10*3/MM3 (ref 140–450)
PMV BLD AUTO: 8.8 FL (ref 6–12)
POTASSIUM SERPL-SCNC: 3.6 MMOL/L (ref 3.5–5.2)
QT INTERVAL: 360 MS
QTC INTERVAL: 439 MS
RBC # BLD AUTO: 4.99 10*6/MM3 (ref 3.77–5.28)
SODIUM SERPL-SCNC: 142 MMOL/L (ref 136–145)
WBC NRBC COR # BLD AUTO: 6.11 10*3/MM3 (ref 3.4–10.8)

## 2024-02-15 PROCEDURE — 36415 COLL VENOUS BLD VENIPUNCTURE: CPT

## 2024-02-15 PROCEDURE — 93010 ELECTROCARDIOGRAM REPORT: CPT | Performed by: INTERNAL MEDICINE

## 2024-02-15 PROCEDURE — 85027 COMPLETE CBC AUTOMATED: CPT

## 2024-02-15 PROCEDURE — 93005 ELECTROCARDIOGRAM TRACING: CPT

## 2024-02-15 PROCEDURE — 80048 BASIC METABOLIC PNL TOTAL CA: CPT

## 2024-02-15 NOTE — DISCHARGE INSTRUCTIONS
Take the following medications the morning of surgery:    Pristiq   pantoprazole      If you are on prescription narcotic pain medication to control your pain you may also take that medication the morning of surgery.    General Instructions:  Do not eat solid food after midnight the night before surgery.  You may drink clear liquids day of surgery but must stop at least one hour before your hospital arrival time.  It is beneficial for you to have a clear drink that contains carbohydrates the day of surgery.  We suggest a 12 to 20 ounce bottle of Gatorade or Powerade for non-diabetic patients or a 12 to 20 ounce bottle of G2 or Powerade Zero for diabetic patients. (Pediatric patients, are not advised to drink a 12 to 20 ounce carbohydrate drink)    Clear liquids are liquids you can see through.  Nothing red in color.     Plain water                               Sports drinks  Sodas                                   Gelatin (Jell-O)  Fruit juices without pulp such as white grape juice and apple juice  Popsicles that contain no fruit or yogurt  Tea or coffee (no cream or milk added)  Gatorade / Powerade  G2 / Powerade Zero    Infants may have breast milk up to four hours before surgery.  Infants drinking formula may drink formula up to six hours before surgery.   Patients who avoid smoking, chewing tobacco and alcohol for 4 weeks prior to surgery have a reduced risk of post-operative complications.  Quit smoking as many days before surgery as you can.  Do not smoke, use chewing tobacco or drink alcohol the day of surgery.   If applicable bring your C-PAP/ BI-PAP machine in with you to preop day of surgery.  Bring any papers given to you in the doctor’s office.  Wear clean comfortable clothes.  Do not wear contact lenses, false eyelashes or make-up.  Bring a case for your glasses.   Bring crutches or walker if applicable.  Remove all piercings.  Leave jewelry and any other valuables at home.  Hair extensions with metal  clips must be removed prior to surgery.  The Pre-Admission Testing nurse will instruct you to bring medications if unable to obtain an accurate list in Pre-Admission Testing.        If you were given a blood bank ID arm band remember to bring it with you the day of surgery.    Preventing a Surgical Site Infection:  For 2 to 3 days before surgery, avoid shaving with a razor because the razor can irritate skin and make it easier to develop an infection.    Any areas of open skin can increase the risk of a post-operative wound infection by allowing bacteria to enter and travel throughout the body.  Notify your surgeon if you have any skin wounds / rashes even if it is not near the expected surgical site.  The area will need assessed to determine if surgery should be delayed until it is healed.  The night prior to surgery shower using a fresh bar of anti-bacterial soap (such as Dial) and clean washcloth.  Sleep in a clean bed with clean clothing.  Do not allow pets to sleep with you.  Shower on the morning of surgery using a fresh bar of anti-bacterial soap (such as Dial) and clean washcloth.  Dry with a clean towel and dress in clean clothing.  Ask your surgeon if you will be receiving antibiotics prior to surgery.  Make sure you, your family, and all healthcare providers clean their hands with soap and water or an alcohol based hand  before caring for you or your wound.    Day of surgery:2/28/2024   Hospital to call with time of arrival  Your arrival time is approximately two hours before your scheduled surgery time.  Upon arrival, a Pre-op nurse and Anesthesiologist will review your health history, obtain vital signs, and answer questions you may have.  The only belongings needed at this time will be a list of your home medications and if applicable your C-PAP/BI-PAP machine.  A Pre-op nurse will start an IV and you may receive medication in preparation for surgery, including something to help you relax.      Please be aware that surgery does come with discomfort.  We want to make every effort to control your discomfort so please discuss any uncontrolled symptoms with your nurse.   Your doctor will most likely have prescribed pain medications.      If you are going home after surgery you will receive individualized written care instructions before being discharged.  A responsible adult must drive you to and from the hospital on the day of your surgery and ideally stay with you through the night.   .  Discharge prescriptions can be filled by the hospital pharmacy during regular pharmacy hours.  If you are having surgery late in the day/evening your prescription may be e-prescribed to your pharmacy.  Please verify your pharmacy hours or chose a 24 hour pharmacy to avoid not having access to your prescription because your pharmacy has closed for the day.    If you are staying overnight following surgery, you will be transported to your hospital room following the recovery period.  [facility] has all private rooms.    If you have any questions please call Pre-Admission Testing at [phone].  Deductibles and co-payments are collected on the day of service. Please be prepared to pay the required co-pay, deductible or deposit on the day of service as defined by your plan.    Call your surgeon immediately if you experience any of the following symptoms:  Sore Throat  Shortness of Breath or difficulty breathing  Cough  Chills  Body soreness or muscle pain  Headache  Fever  New loss of taste or smell  Do not arrive for your surgery ill.  Your procedure will need to be rescheduled to another time.  You will need to call your physician before the day of surgery to avoid any unnecessary exposure to hospital staff as well as other patients.  CHLORHEXIDINE CLOTH INSTRUCTIONS  The morning of surgery follow these instructions using the Chlorhexidine cloths you've been given.  These steps reduce bacteria on the  body.  Do not use the cloths near your eyes, ears mouth, genitalia or on open wounds.  Throw the cloths away after use but do not try to flush them down a toilet.      Open and remove one cloth at a time from the package.    Leave the cloth unfolded and begin the bathing.  Massage the skin with the cloths using gentle pressure to remove bacteria.  Do not scrub harshly.   Follow the steps below with one 2% CHG cloth per area (6 total cloths).  One cloth for neck, shoulders and chest.  One cloth for both arms, hands, fingers and underarms (do underarms last).  One cloth for the abdomen followed by groin.  One cloth for right leg and foot including between the toes.  One cloth for left leg and foot including between the toes.  The last cloth is to be used for the back of the neck, back and buttocks.    Allow the CHG to air dry 3 minutes on the skin which will give it time to work and decrease the chance of irritation.  The skin may feel sticky until it is dry.  Do not rinse with water or any other liquid or you will lose the beneficial effects of the CHG.  If mild skin irritation occurs, do rinse the skin to remove the CHG.  Report this to the nurse at time of admission.  Do not apply lotions, creams, ointments, deodorants or perfumes after using the clothes. Dress in clean clothes before coming to the hospital.

## 2024-02-22 ENCOUNTER — OFFICE VISIT (OUTPATIENT)
Dept: ORTHOPEDIC SURGERY | Facility: CLINIC | Age: 54
End: 2024-02-22
Payer: COMMERCIAL

## 2024-02-22 VITALS
BODY MASS INDEX: 43.36 KG/M2 | HEIGHT: 64 IN | DIASTOLIC BLOOD PRESSURE: 78 MMHG | WEIGHT: 254 LBS | TEMPERATURE: 97.5 F | SYSTOLIC BLOOD PRESSURE: 122 MMHG

## 2024-02-22 DIAGNOSIS — R52 PAIN: Primary | ICD-10-CM

## 2024-02-22 NOTE — H&P (VIEW-ONLY)
Patient: Ce YEBOAH [facility]    Date of Admission: 2/28/2024    YOB: 1970    Medical Record Number: [id number]    Admitting Physician: Dr. James Coy    Reason for Admission: End Stage Right Knee OA    History of Present Illness: 53 y.o. female presents with severe end stage knee osteoarthritis which has not been responsive to the full compliment of conservative measures. Despite conservative attempts, there is still severe, constant activity-limiting pain. Given the severity of the pain, the patient has elected to proceed with knee replacement.    Allergies:   Allergies   Allergen Reactions    Avocado Anaphylaxis    Latex Anaphylaxis, Itching, Dermatitis and Rash     THROAT SWELLING    Sulfa Antibiotics Itching    Adhesive Tape Swelling     Gets some redness and swelling         Current Medications:  Home Medications:    Current Outpatient Medications on File Prior to Visit   Medication Sig    albuterol sulfate HFA (ProAir HFA) 108 (90 Base) MCG/ACT inhaler Inhale 1 puff Every 4 (Four) Hours As Needed for Wheezing or Shortness of Air.    azelaic acid (AZELEX) 15 % gel Apply a small amount topically to the appropriate area as directed 2 (Two) Times a Day.    cephalexin (KEFLEX) 500 MG capsule Take all 4 capsules by mouth one hour prior to procedure (Patient taking differently: Take 4 capsules by mouth As Needed. Take all 4 capsules by mouth one hour prior to dental procedure)    cyclobenzaprine (FLEXERIL) 10 MG tablet Take 1 tablet by mouth 3 (Three) Times a Day As Needed for Muscle Spasms.    desvenlafaxine (Pristiq) 50 MG 24 hr tablet Take 1 tablet by mouth Daily. (Patient taking differently: Take 1 tablet by mouth Daily. morning)    ibuprofen (ADVIL,MOTRIN) 800 MG tablet Take 1 tablet by mouth Every 8 (Eight) Hours As Needed for Mild Pain. (Patient taking differently: Take 1 tablet by mouth Every 8 (Eight) Hours As Needed for Mild Pain. To check with MD about when to stop before surgery)    meloxicam  (Mobic) 7.5 MG tablet Take 1 tablet by mouth Daily. (Patient taking differently: Take 1 tablet by mouth Daily. To stop prior to surgery)    ondansetron (Zofran) 4 MG tablet Take 1 tablet by mouth Every 8 (Eight) Hours As Needed for Nausea or Vomiting for up to 10 doses.    pantoprazole (Protonix) 40 MG EC tablet Take 1 tablet by mouth Daily As Needed (dyspepsia). (Patient taking differently: Take 1 tablet by mouth Daily. morning)    spironolactone-hydrochlorothiazide (Aldactazide) 25-25 MG tablet Take 1 tablet by mouth Daily. (Patient taking differently: Take 1 tablet by mouth Daily. morning)     No current facility-administered medications on file prior to visit.     PRN Meds:.    PMH:     Past Medical History:   Diagnosis Date    Allergic     Anemia     Ankle swelling     Anxiety and depression     Arthritis     At risk for sleep apnea     5    Back problem     LOW BACK PAIN, DISC ISSUES    Cervical disc disorder     Eczema     uses hydrocortisone cream prn    GERD (gastroesophageal reflux disease)     History of bronchitis     prn inhaler    History of transfusion     no reaction    Knee problem     Knee swelling     Left knee pain     Loose stools     states has happened after starting pristiq    Low back pain     Lumbar radiculopathy 2023    Lumbosacral disc disease 2020    Methicillin resistant Staph aureus culture positive     HX 10-15 YEARS AGO, NASAL, TREATED [facility]    Obesity     PONV (postoperative nausea and vomiting)     Prediabetes     Rosacea     S/P cervical spinal fusion     Shortness of breath on exertion     FROM DECONDITIONING FROM KNEE PAIN    SVT (supraventricular tachycardia)     HX, ANXIETY RELATED PER PT    Tendinitis of right elbow        PF/Surg/Soc Hx:     Past Surgical History:   Procedure Laterality Date    ADENOIDECTOMY      ANTERIOR CERVICAL DISCECTOMY W/ FUSION      C6-7 acdf    APPENDECTOMY  2012     SECTION      COLONOSCOPY       DIAGNOSTIC LAPAROSCOPY, SALPINGO OOPHORECTOMY LAPAROSCOPIC  2008    ENDOMETRIAL ABLATION  10/2012    EPIDURAL BLOCK      HYSTERECTOMY      LAPAROSCOPIC CHOLECYSTECTOMY  2005    LASIK Bilateral 2000    TONSILLECTOMY      TOTAL KNEE ARTHROPLASTY Left 2023    Procedure: TOTAL KNEE ARTHROPLASTY;  Surgeon: James Coy MD;  Location: [facility] OR [facility] [place];  Service: Orthopedics;  Laterality: Left;    VENTRAL HERNIA REPAIR  2016        Social History     Occupational History    Not on file   Tobacco Use    Smoking status: Former     Packs/day: 1.00     Years: 8.00     Additional pack years: 0.00     Total pack years: 8.00     Types: Cigarettes     Quit date: 2015     Years since quittin.1     Passive exposure: Past    Smokeless tobacco: Never   Vaping Use    Vaping Use: Never used   Substance and Sexual Activity    Alcohol use: Yes     Comment: 3 monthly    Drug use: Never    Sexual activity: Not Currently     Partners: Male     Birth control/protection: None      Social History     Social History Narrative    Not on file        Family History   Problem Relation Age of Onset    Breast cancer Mother     Stroke Mother     Hypertension Mother     Hyperlipidemia Mother     Cancer Mother         Breast ca    Diabetes Mother     Mental illness Mother     Depression Mother     Osteoporosis Mother     Hypertension Father     Hyperlipidemia Father     Drug abuse Sister     Depression Sister     Mental illness Sister     ADD / ADHD Son     Asthma Son     Heart disease Maternal Grandmother     Colon cancer Maternal Grandfather     Throat cancer Paternal Grandfather     Malig Hyperthermia Neg Hx          Review of Systems:   A 14 point review of systems was performed, pertinent positives discussed above, all other systems are negative    Physical Exam: 53 y.o. female  Vital Signs :   Vitals:    24 1521   BP: 122/78   BP Location: Left arm   Patient Position: Sitting   Cuff Size: Large Adult   Temp: 97.5  "°F (36.4 °C)   TempSrc: Temporal   Weight: 115 kg (254 lb)   Height: 162.6 cm (64.02\")   PainSc:   3   PainLoc: Knee     General: Alert and Oriented x 3, No acute distress.  Psych: mood and affect appropriate; recent and remote memory intact  Eyes: conjunctivae clear; pupils equally round, sclerae anicteric  CV: RRR  Resp: normal respiratory effort  Skin: no rashes or wounds; normal turgor      Xrays:  -3 views (AP, lateral, and sunrise) were reviewed demonstrating end-stage OA with bone on bone articulation.  -A full length AP xray was ordered and reviewed today for purposes of operative alignment demonstrating end stage arthritic findings. There are no previous full length films for review    Assessment:  End-stage Right knee osteoarthritis. Conservative measures have failed.      Plan:  The plan is to proceed with Right Total Knee Replacement. The patient voiced understanding of the risks, benefits, and alternative forms of treatment that were discussed with Dr Coy at the time of scheduling.  23-hour home health    Ana Adkins, APRN  2/22/2024         "

## 2024-02-22 NOTE — H&P
Patient: Ce YEBOAH [facility]    Date of Admission: 2/28/2024    YOB: 1970    Medical Record Number: [id number]    Admitting Physician: Dr. James Coy    Reason for Admission: End Stage Right Knee OA    History of Present Illness: 53 y.o. female presents with severe end stage knee osteoarthritis which has not been responsive to the full compliment of conservative measures. Despite conservative attempts, there is still severe, constant activity-limiting pain. Given the severity of the pain, the patient has elected to proceed with knee replacement.    Allergies:   Allergies   Allergen Reactions    Avocado Anaphylaxis    Latex Anaphylaxis, Itching, Dermatitis and Rash     THROAT SWELLING    Sulfa Antibiotics Itching    Adhesive Tape Swelling     Gets some redness and swelling         Current Medications:  Home Medications:    Current Outpatient Medications on File Prior to Visit   Medication Sig    albuterol sulfate HFA (ProAir HFA) 108 (90 Base) MCG/ACT inhaler Inhale 1 puff Every 4 (Four) Hours As Needed for Wheezing or Shortness of Air.    azelaic acid (AZELEX) 15 % gel Apply a small amount topically to the appropriate area as directed 2 (Two) Times a Day.    cephalexin (KEFLEX) 500 MG capsule Take all 4 capsules by mouth one hour prior to procedure (Patient taking differently: Take 4 capsules by mouth As Needed. Take all 4 capsules by mouth one hour prior to dental procedure)    cyclobenzaprine (FLEXERIL) 10 MG tablet Take 1 tablet by mouth 3 (Three) Times a Day As Needed for Muscle Spasms.    desvenlafaxine (Pristiq) 50 MG 24 hr tablet Take 1 tablet by mouth Daily. (Patient taking differently: Take 1 tablet by mouth Daily. morning)    ibuprofen (ADVIL,MOTRIN) 800 MG tablet Take 1 tablet by mouth Every 8 (Eight) Hours As Needed for Mild Pain. (Patient taking differently: Take 1 tablet by mouth Every 8 (Eight) Hours As Needed for Mild Pain. To check with MD about when to stop before surgery)    meloxicam  (Mobic) 7.5 MG tablet Take 1 tablet by mouth Daily. (Patient taking differently: Take 1 tablet by mouth Daily. To stop prior to surgery)    ondansetron (Zofran) 4 MG tablet Take 1 tablet by mouth Every 8 (Eight) Hours As Needed for Nausea or Vomiting for up to 10 doses.    pantoprazole (Protonix) 40 MG EC tablet Take 1 tablet by mouth Daily As Needed (dyspepsia). (Patient taking differently: Take 1 tablet by mouth Daily. morning)    spironolactone-hydrochlorothiazide (Aldactazide) 25-25 MG tablet Take 1 tablet by mouth Daily. (Patient taking differently: Take 1 tablet by mouth Daily. morning)     No current facility-administered medications on file prior to visit.     PRN Meds:.    PMH:     Past Medical History:   Diagnosis Date    Allergic     Anemia     Ankle swelling     Anxiety and depression     Arthritis     At risk for sleep apnea     5    Back problem     LOW BACK PAIN, DISC ISSUES    Cervical disc disorder     Eczema     uses hydrocortisone cream prn    GERD (gastroesophageal reflux disease)     History of bronchitis     prn inhaler    History of transfusion     no reaction    Knee problem     Knee swelling     Left knee pain     Loose stools     states has happened after starting pristiq    Low back pain     Lumbar radiculopathy 2023    Lumbosacral disc disease 2020    Methicillin resistant Staph aureus culture positive     HX 10-15 YEARS AGO, NASAL, TREATED [facility]    Obesity     PONV (postoperative nausea and vomiting)     Prediabetes     Rosacea     S/P cervical spinal fusion     Shortness of breath on exertion     FROM DECONDITIONING FROM KNEE PAIN    SVT (supraventricular tachycardia)     HX, ANXIETY RELATED PER PT    Tendinitis of right elbow        PF/Surg/Soc Hx:     Past Surgical History:   Procedure Laterality Date    ADENOIDECTOMY      ANTERIOR CERVICAL DISCECTOMY W/ FUSION      C6-7 acdf    APPENDECTOMY  2012     SECTION      COLONOSCOPY       DIAGNOSTIC LAPAROSCOPY, SALPINGO OOPHORECTOMY LAPAROSCOPIC  2008    ENDOMETRIAL ABLATION  10/2012    EPIDURAL BLOCK      HYSTERECTOMY      LAPAROSCOPIC CHOLECYSTECTOMY  2005    LASIK Bilateral 2000    TONSILLECTOMY      TOTAL KNEE ARTHROPLASTY Left 2023    Procedure: TOTAL KNEE ARTHROPLASTY;  Surgeon: James Coy MD;  [place] [facility] [place] [facility] [place]  Service: Orthopedics;  Laterality: Left;    VENTRAL HERNIA REPAIR  2016        Social History     Occupational History    Not on file   Tobacco Use    Smoking status: Former     Packs/day: 1.00     Years: 8.00     Additional pack years: 0.00     Total pack years: 8.00     Types: Cigarettes     Quit date: 2015     Years since quittin.1     Passive exposure: Past    Smokeless tobacco: Never   Vaping Use    Vaping Use: Never used   Substance and Sexual Activity    Alcohol use: Yes     Comment: 3 monthly    Drug use: Never    Sexual activity: Not Currently     Partners: Male     Birth control/protection: None      Social History     Social History Narrative    Not on file        Family History   Problem Relation Age of Onset    Breast cancer Mother     Stroke Mother     Hypertension Mother     Hyperlipidemia Mother     Cancer Mother         Breast ca    Diabetes Mother     Mental illness Mother     Depression Mother     Osteoporosis Mother     Hypertension Father     Hyperlipidemia Father     Drug abuse Sister     Depression Sister     Mental illness Sister     ADD / ADHD Son     Asthma Son     Heart disease Maternal Grandmother     Colon cancer Maternal Grandfather     Throat cancer Paternal Grandfather     Malig Hyperthermia Neg Hx          Review of Systems:   A 14 point review of systems was performed, pertinent positives discussed above, all other systems are negative    Physical Exam: 53 y.o. female  Vital Signs :   Vitals:    24 1521   BP: 122/78   BP Location: Left arm   Patient Position: Sitting   Cuff Size: Large Adult   Temp: 97.5  "°F (36.4 °C)   TempSrc: Temporal   Weight: 115 kg (254 lb)   Height: 162.6 cm (64.02\")   PainSc:   3   PainLoc: Knee     General: Alert and Oriented x 3, No acute distress.  Psych: mood and affect appropriate; recent and remote memory intact  Eyes: conjunctivae clear; pupils equally round, sclerae anicteric  CV: RRR  Resp: normal respiratory effort  Skin: no rashes or wounds; normal turgor      Xrays:  -3 views (AP, lateral, and sunrise) were reviewed demonstrating end-stage OA with bone on bone articulation.  -A full length AP xray was ordered and reviewed today for purposes of operative alignment demonstrating end stage arthritic findings. There are no previous full length films for review    Assessment:  End-stage Right knee osteoarthritis. Conservative measures have failed.      Plan:  The plan is to proceed with Right Total Knee Replacement. The patient voiced understanding of the risks, benefits, and alternative forms of treatment that were discussed with Dr Coy at the time of scheduling.  23-hour home health    Ana Adkins, APRN  2/22/2024         "

## 2024-02-28 ENCOUNTER — HOSPITAL ENCOUNTER (OUTPATIENT)
Facility: HOSPITAL | Age: 54
Discharge: HOME HEALTH | End: 2024-02-29
Attending: ORTHOPAEDIC SURGERY | Admitting: ORTHOPAEDIC SURGERY
Payer: COMMERCIAL

## 2024-02-28 ENCOUNTER — ANESTHESIA (OUTPATIENT)
Dept: PERIOP | Facility: HOSPITAL | Age: 54
End: 2024-02-28
Payer: COMMERCIAL

## 2024-02-28 ENCOUNTER — APPOINTMENT (OUTPATIENT)
Dept: GENERAL RADIOLOGY | Facility: HOSPITAL | Age: 54
End: 2024-02-28
Payer: COMMERCIAL

## 2024-02-28 ENCOUNTER — ANESTHESIA EVENT (OUTPATIENT)
Dept: PERIOP | Facility: HOSPITAL | Age: 54
End: 2024-02-28
Payer: COMMERCIAL

## 2024-02-28 DIAGNOSIS — Z96.651 S/P TOTAL KNEE REPLACEMENT, RIGHT: Primary | ICD-10-CM

## 2024-02-28 DIAGNOSIS — M17.11 PRIMARY OSTEOARTHRITIS OF RIGHT KNEE: ICD-10-CM

## 2024-02-28 PROCEDURE — 73560 X-RAY EXAM OF KNEE 1 OR 2: CPT

## 2024-02-28 PROCEDURE — 25010000002 HYDROMORPHONE PER 4 MG: Performed by: NURSE ANESTHETIST, CERTIFIED REGISTERED

## 2024-02-28 PROCEDURE — C1776 JOINT DEVICE (IMPLANTABLE): HCPCS | Performed by: ORTHOPAEDIC SURGERY

## 2024-02-28 PROCEDURE — 25810000003 SODIUM CHLORIDE 0.9 % SOLUTION: Performed by: NURSE PRACTITIONER

## 2024-02-28 PROCEDURE — 25010000002 CEFAZOLIN IN DEXTROSE 2-4 GM/100ML-% SOLUTION: Performed by: NURSE PRACTITIONER

## 2024-02-28 PROCEDURE — 25010000002 PROPOFOL 10 MG/ML EMULSION: Performed by: NURSE ANESTHETIST, CERTIFIED REGISTERED

## 2024-02-28 PROCEDURE — 25010000002 PROPOFOL 200 MG/20ML EMULSION: Performed by: NURSE ANESTHETIST, CERTIFIED REGISTERED

## 2024-02-28 PROCEDURE — G0378 HOSPITAL OBSERVATION PER HR: HCPCS

## 2024-02-28 PROCEDURE — 25010000002 VANCOMYCIN 10 G RECONSTITUTED SOLUTION: Performed by: NURSE PRACTITIONER

## 2024-02-28 PROCEDURE — 27447 TOTAL KNEE ARTHROPLASTY: CPT | Performed by: ORTHOPAEDIC SURGERY

## 2024-02-28 PROCEDURE — 25010000002 SUGAMMADEX 200 MG/2ML SOLUTION

## 2024-02-28 PROCEDURE — 25010000002 FENTANYL CITRATE (PF) 100 MCG/2ML SOLUTION: Performed by: NURSE ANESTHETIST, CERTIFIED REGISTERED

## 2024-02-28 PROCEDURE — C1713 ANCHOR/SCREW BN/BN,TIS/BN: HCPCS | Performed by: ORTHOPAEDIC SURGERY

## 2024-02-28 PROCEDURE — 25810000003 LACTATED RINGERS SOLUTION: Performed by: NURSE PRACTITIONER

## 2024-02-28 PROCEDURE — C9290 INJ, BUPIVACAINE LIPOSOME: HCPCS | Performed by: ORTHOPAEDIC SURGERY

## 2024-02-28 PROCEDURE — 25010000002 DEXAMETHASONE SODIUM PHOSPHATE 20 MG/5ML SOLUTION: Performed by: ANESTHESIOLOGY

## 2024-02-28 PROCEDURE — 25010000002 MIDAZOLAM PER 1 MG: Performed by: ANESTHESIOLOGY

## 2024-02-28 PROCEDURE — 25010000002 ROPIVACAINE PER 1 MG: Performed by: ANESTHESIOLOGY

## 2024-02-28 PROCEDURE — 25010000002 ACETAMINOPHEN 10 MG/ML SOLUTION: Performed by: NURSE ANESTHETIST, CERTIFIED REGISTERED

## 2024-02-28 PROCEDURE — 0 BUPIVACAINE LIPOSOME 1.3 % SUSPENSION 20 ML VIAL: Performed by: ORTHOPAEDIC SURGERY

## 2024-02-28 PROCEDURE — 25010000002 DROPERIDOL PER 5 MG: Performed by: NURSE ANESTHETIST, CERTIFIED REGISTERED

## 2024-02-28 PROCEDURE — 25010000002 ONDANSETRON PER 1 MG

## 2024-02-28 PROCEDURE — 25010000002 FENTANYL CITRATE (PF) 50 MCG/ML SOLUTION: Performed by: ANESTHESIOLOGY

## 2024-02-28 PROCEDURE — 25810000003 LACTATED RINGERS PER 1000 ML: Performed by: ANESTHESIOLOGY

## 2024-02-28 PROCEDURE — 25010000002 MAGNESIUM SULFATE PER 500 MG OF MAGNESIUM: Performed by: NURSE ANESTHETIST, CERTIFIED REGISTERED

## 2024-02-28 DEVICE — IMPLANTABLE DEVICE: Type: IMPLANTABLE DEVICE | Status: FUNCTIONAL

## 2024-02-28 DEVICE — IMPLANTABLE DEVICE: Type: IMPLANTABLE DEVICE | Site: KNEE | Status: FUNCTIONAL

## 2024-02-28 DEVICE — DEV CONTRL TISS STRATAFIX SYMM PDS PLUS VIL CT-1 60CM: Type: IMPLANTABLE DEVICE | Site: KNEE | Status: FUNCTIONAL

## 2024-02-28 DEVICE — CMT BONE PALACOS R HI/VISC 1X40: Type: IMPLANTABLE DEVICE | Site: KNEE | Status: FUNCTIONAL

## 2024-02-28 DEVICE — DEV CONTRL TISS STRATAFIXSPIRALMNCRYL PLSPS2 REV3/0 45CM: Type: IMPLANTABLE DEVICE | Site: KNEE | Status: FUNCTIONAL

## 2024-02-28 RX ORDER — FAMOTIDINE 10 MG/ML
20 INJECTION, SOLUTION INTRAVENOUS ONCE
Status: COMPLETED | OUTPATIENT
Start: 2024-02-28 | End: 2024-02-28

## 2024-02-28 RX ORDER — CYCLOBENZAPRINE HCL 10 MG
10 TABLET ORAL 3 TIMES DAILY PRN
Status: DISCONTINUED | OUTPATIENT
Start: 2024-02-28 | End: 2024-02-29 | Stop reason: HOSPADM

## 2024-02-28 RX ORDER — PROPOFOL 10 MG/ML
INJECTION, EMULSION INTRAVENOUS AS NEEDED
Status: DISCONTINUED | OUTPATIENT
Start: 2024-02-28 | End: 2024-02-28 | Stop reason: SURG

## 2024-02-28 RX ORDER — SODIUM CHLORIDE 0.9 % (FLUSH) 0.9 %
3-10 SYRINGE (ML) INJECTION AS NEEDED
Status: DISCONTINUED | OUTPATIENT
Start: 2024-02-28 | End: 2024-02-28 | Stop reason: HOSPADM

## 2024-02-28 RX ORDER — HYDROCODONE BITARTRATE AND ACETAMINOPHEN 7.5; 325 MG/1; MG/1
1 TABLET ORAL EVERY 4 HOURS PRN
Qty: 56 TABLET | Refills: 0 | Status: SHIPPED | OUTPATIENT
Start: 2024-02-28

## 2024-02-28 RX ORDER — UREA 10 %
1 LOTION (ML) TOPICAL NIGHTLY PRN
Status: DISCONTINUED | OUTPATIENT
Start: 2024-02-28 | End: 2024-02-29 | Stop reason: HOSPADM

## 2024-02-28 RX ORDER — HYDROCODONE BITARTRATE AND ACETAMINOPHEN 7.5; 325 MG/1; MG/1
1 TABLET ORAL EVERY 4 HOURS PRN
Status: DISCONTINUED | OUTPATIENT
Start: 2024-02-28 | End: 2024-02-28 | Stop reason: SDUPTHER

## 2024-02-28 RX ORDER — PANTOPRAZOLE SODIUM 40 MG/1
40 TABLET, DELAYED RELEASE ORAL DAILY
Qty: 14 TABLET | Refills: 0 | Status: SHIPPED | OUTPATIENT
Start: 2024-02-28 | End: 2024-02-29 | Stop reason: HOSPADM

## 2024-02-28 RX ORDER — PREGABALIN 75 MG/1
150 CAPSULE ORAL ONCE
Status: COMPLETED | OUTPATIENT
Start: 2024-02-28 | End: 2024-02-28

## 2024-02-28 RX ORDER — HYDROCODONE BITARTRATE AND ACETAMINOPHEN 7.5; 325 MG/1; MG/1
1 TABLET ORAL EVERY 4 HOURS PRN
Status: DISCONTINUED | OUTPATIENT
Start: 2024-02-28 | End: 2024-02-29 | Stop reason: HOSPADM

## 2024-02-28 RX ORDER — PROMETHAZINE HYDROCHLORIDE 25 MG/1
25 TABLET ORAL ONCE AS NEEDED
Status: DISCONTINUED | OUTPATIENT
Start: 2024-02-28 | End: 2024-02-28 | Stop reason: HOSPADM

## 2024-02-28 RX ORDER — PROMETHAZINE HYDROCHLORIDE 12.5 MG/1
12.5 TABLET ORAL EVERY 4 HOURS PRN
Status: DISCONTINUED | OUTPATIENT
Start: 2024-02-28 | End: 2024-02-29 | Stop reason: HOSPADM

## 2024-02-28 RX ORDER — SODIUM CHLORIDE, SODIUM LACTATE, POTASSIUM CHLORIDE, CALCIUM CHLORIDE 600; 310; 30; 20 MG/100ML; MG/100ML; MG/100ML; MG/100ML
9 INJECTION, SOLUTION INTRAVENOUS CONTINUOUS
Status: DISCONTINUED | OUTPATIENT
Start: 2024-02-28 | End: 2024-02-28

## 2024-02-28 RX ORDER — DROPERIDOL 2.5 MG/ML
INJECTION, SOLUTION INTRAMUSCULAR; INTRAVENOUS AS NEEDED
Status: DISCONTINUED | OUTPATIENT
Start: 2024-02-28 | End: 2024-02-28 | Stop reason: SURG

## 2024-02-28 RX ORDER — HYDROCODONE BITARTRATE AND ACETAMINOPHEN 5; 325 MG/1; MG/1
1 TABLET ORAL ONCE AS NEEDED
Status: DISCONTINUED | OUTPATIENT
Start: 2024-02-28 | End: 2024-02-28 | Stop reason: HOSPADM

## 2024-02-28 RX ORDER — HYDROCODONE BITARTRATE AND ACETAMINOPHEN 7.5; 325 MG/1; MG/1
2 TABLET ORAL EVERY 4 HOURS PRN
Status: DISCONTINUED | OUTPATIENT
Start: 2024-02-28 | End: 2024-02-29 | Stop reason: HOSPADM

## 2024-02-28 RX ORDER — CHLORHEXIDINE GLUCONATE 500 MG/1
CLOTH TOPICAL 2 TIMES DAILY
Status: DISCONTINUED | OUTPATIENT
Start: 2024-02-28 | End: 2024-02-28

## 2024-02-28 RX ORDER — FENTANYL CITRATE 50 UG/ML
50 INJECTION, SOLUTION INTRAMUSCULAR; INTRAVENOUS ONCE AS NEEDED
Status: COMPLETED | OUTPATIENT
Start: 2024-02-28 | End: 2024-02-28

## 2024-02-28 RX ORDER — FENTANYL CITRATE 50 UG/ML
50 INJECTION, SOLUTION INTRAMUSCULAR; INTRAVENOUS
Status: DISCONTINUED | OUTPATIENT
Start: 2024-02-28 | End: 2024-02-28 | Stop reason: HOSPADM

## 2024-02-28 RX ORDER — CEFAZOLIN SODIUM 2 G/100ML
2000 INJECTION, SOLUTION INTRAVENOUS EVERY 8 HOURS
Qty: 200 ML | Refills: 0 | Status: COMPLETED | OUTPATIENT
Start: 2024-02-28 | End: 2024-02-29

## 2024-02-28 RX ORDER — PANTOPRAZOLE SODIUM 40 MG/1
40 TABLET, DELAYED RELEASE ORAL DAILY
Status: DISCONTINUED | OUTPATIENT
Start: 2024-02-28 | End: 2024-02-29 | Stop reason: HOSPADM

## 2024-02-28 RX ORDER — ONDANSETRON 2 MG/ML
4 INJECTION INTRAMUSCULAR; INTRAVENOUS ONCE AS NEEDED
Status: DISCONTINUED | OUTPATIENT
Start: 2024-02-28 | End: 2024-02-28 | Stop reason: HOSPADM

## 2024-02-28 RX ORDER — CEFAZOLIN SODIUM 2 G/100ML
2 INJECTION, SOLUTION INTRAVENOUS ONCE
Status: COMPLETED | OUTPATIENT
Start: 2024-02-28 | End: 2024-02-28

## 2024-02-28 RX ORDER — SODIUM CHLORIDE 0.9 % (FLUSH) 0.9 %
3 SYRINGE (ML) INJECTION EVERY 12 HOURS SCHEDULED
Status: DISCONTINUED | OUTPATIENT
Start: 2024-02-28 | End: 2024-02-28 | Stop reason: HOSPADM

## 2024-02-28 RX ORDER — DIPHENHYDRAMINE HYDROCHLORIDE 50 MG/ML
12.5 INJECTION INTRAMUSCULAR; INTRAVENOUS
Status: DISCONTINUED | OUTPATIENT
Start: 2024-02-28 | End: 2024-02-28 | Stop reason: HOSPADM

## 2024-02-28 RX ORDER — DEXAMETHASONE SODIUM PHOSPHATE 4 MG/ML
INJECTION, SOLUTION INTRA-ARTICULAR; INTRALESIONAL; INTRAMUSCULAR; INTRAVENOUS; SOFT TISSUE
Status: COMPLETED | OUTPATIENT
Start: 2024-02-28 | End: 2024-02-28

## 2024-02-28 RX ORDER — MIDAZOLAM HYDROCHLORIDE 1 MG/ML
1 INJECTION INTRAMUSCULAR; INTRAVENOUS
Status: DISCONTINUED | OUTPATIENT
Start: 2024-02-28 | End: 2024-02-28 | Stop reason: HOSPADM

## 2024-02-28 RX ORDER — SCOLOPAMINE TRANSDERMAL SYSTEM 1 MG/1
1 PATCH, EXTENDED RELEASE TRANSDERMAL
Status: DISCONTINUED | OUTPATIENT
Start: 2024-02-28 | End: 2024-02-28 | Stop reason: HOSPADM

## 2024-02-28 RX ORDER — FENTANYL CITRATE 50 UG/ML
INJECTION, SOLUTION INTRAMUSCULAR; INTRAVENOUS AS NEEDED
Status: DISCONTINUED | OUTPATIENT
Start: 2024-02-28 | End: 2024-02-28 | Stop reason: SURG

## 2024-02-28 RX ORDER — KETAMINE HCL IN NACL, ISO-OSM 100MG/10ML
SYRINGE (ML) INJECTION AS NEEDED
Status: DISCONTINUED | OUTPATIENT
Start: 2024-02-28 | End: 2024-02-28 | Stop reason: SURG

## 2024-02-28 RX ORDER — FLUMAZENIL 0.1 MG/ML
0.2 INJECTION INTRAVENOUS AS NEEDED
Status: DISCONTINUED | OUTPATIENT
Start: 2024-02-28 | End: 2024-02-28 | Stop reason: HOSPADM

## 2024-02-28 RX ORDER — DROPERIDOL 2.5 MG/ML
0.62 INJECTION, SOLUTION INTRAMUSCULAR; INTRAVENOUS
Status: DISCONTINUED | OUTPATIENT
Start: 2024-02-28 | End: 2024-02-28 | Stop reason: HOSPADM

## 2024-02-28 RX ORDER — DESVENLAFAXINE SUCCINATE 50 MG/1
50 TABLET, EXTENDED RELEASE ORAL DAILY
Status: DISCONTINUED | OUTPATIENT
Start: 2024-02-28 | End: 2024-02-29 | Stop reason: HOSPADM

## 2024-02-28 RX ORDER — HYDROMORPHONE HYDROCHLORIDE 1 MG/ML
0.5 INJECTION, SOLUTION INTRAMUSCULAR; INTRAVENOUS; SUBCUTANEOUS
Status: DISCONTINUED | OUTPATIENT
Start: 2024-02-28 | End: 2024-02-28 | Stop reason: HOSPADM

## 2024-02-28 RX ORDER — PROMETHAZINE HYDROCHLORIDE 25 MG/1
25 SUPPOSITORY RECTAL ONCE AS NEEDED
Status: DISCONTINUED | OUTPATIENT
Start: 2024-02-28 | End: 2024-02-28 | Stop reason: HOSPADM

## 2024-02-28 RX ORDER — ASPIRIN 81 MG/1
TABLET ORAL
Qty: 60 TABLET | Refills: 0 | Status: SHIPPED | OUTPATIENT
Start: 2024-02-28 | End: 2024-04-10

## 2024-02-28 RX ORDER — SPIRONOLACTONE 25 MG/1
25 TABLET ORAL DAILY
Status: DISCONTINUED | OUTPATIENT
Start: 2024-02-28 | End: 2024-02-29 | Stop reason: HOSPADM

## 2024-02-28 RX ORDER — IPRATROPIUM BROMIDE AND ALBUTEROL SULFATE 2.5; .5 MG/3ML; MG/3ML
3 SOLUTION RESPIRATORY (INHALATION) ONCE AS NEEDED
Status: DISCONTINUED | OUTPATIENT
Start: 2024-02-28 | End: 2024-02-28 | Stop reason: HOSPADM

## 2024-02-28 RX ORDER — NALOXONE HCL 0.4 MG/ML
0.2 VIAL (ML) INJECTION AS NEEDED
Status: DISCONTINUED | OUTPATIENT
Start: 2024-02-28 | End: 2024-02-28 | Stop reason: HOSPADM

## 2024-02-28 RX ORDER — LIDOCAINE HYDROCHLORIDE 10 MG/ML
0.5 INJECTION, SOLUTION INFILTRATION; PERINEURAL ONCE AS NEEDED
Status: DISCONTINUED | OUTPATIENT
Start: 2024-02-28 | End: 2024-02-28 | Stop reason: HOSPADM

## 2024-02-28 RX ORDER — ROPIVACAINE HYDROCHLORIDE 5 MG/ML
INJECTION, SOLUTION EPIDURAL; INFILTRATION; PERINEURAL
Status: COMPLETED | OUTPATIENT
Start: 2024-02-28 | End: 2024-02-28

## 2024-02-28 RX ORDER — ACETAMINOPHEN 10 MG/ML
INJECTION, SOLUTION INTRAVENOUS AS NEEDED
Status: DISCONTINUED | OUTPATIENT
Start: 2024-02-28 | End: 2024-02-28 | Stop reason: SURG

## 2024-02-28 RX ORDER — MAGNESIUM SULFATE HEPTAHYDRATE 500 MG/ML
INJECTION, SOLUTION INTRAMUSCULAR; INTRAVENOUS AS NEEDED
Status: DISCONTINUED | OUTPATIENT
Start: 2024-02-28 | End: 2024-02-28 | Stop reason: SURG

## 2024-02-28 RX ORDER — ONDANSETRON 4 MG/1
4 TABLET, ORALLY DISINTEGRATING ORAL EVERY 6 HOURS PRN
Status: DISCONTINUED | OUTPATIENT
Start: 2024-02-28 | End: 2024-02-29 | Stop reason: HOSPADM

## 2024-02-28 RX ORDER — EPHEDRINE SULFATE 50 MG/ML
5 INJECTION, SOLUTION INTRAVENOUS ONCE AS NEEDED
Status: DISCONTINUED | OUTPATIENT
Start: 2024-02-28 | End: 2024-02-28 | Stop reason: HOSPADM

## 2024-02-28 RX ORDER — TRANEXAMIC ACID 100 MG/ML
INJECTION, SOLUTION INTRAVENOUS AS NEEDED
Status: DISCONTINUED | OUTPATIENT
Start: 2024-02-28 | End: 2024-02-28 | Stop reason: SURG

## 2024-02-28 RX ORDER — LIDOCAINE HYDROCHLORIDE 20 MG/ML
INJECTION, SOLUTION EPIDURAL; INFILTRATION; INTRACAUDAL; PERINEURAL AS NEEDED
Status: DISCONTINUED | OUTPATIENT
Start: 2024-02-28 | End: 2024-02-28 | Stop reason: SURG

## 2024-02-28 RX ORDER — OXYCODONE AND ACETAMINOPHEN 7.5; 325 MG/1; MG/1
1 TABLET ORAL EVERY 4 HOURS PRN
Status: DISCONTINUED | OUTPATIENT
Start: 2024-02-28 | End: 2024-02-28 | Stop reason: HOSPADM

## 2024-02-28 RX ORDER — ROCURONIUM BROMIDE 10 MG/ML
INJECTION, SOLUTION INTRAVENOUS AS NEEDED
Status: DISCONTINUED | OUTPATIENT
Start: 2024-02-28 | End: 2024-02-28 | Stop reason: SURG

## 2024-02-28 RX ORDER — MELOXICAM 15 MG/1
15 TABLET ORAL ONCE
Status: COMPLETED | OUTPATIENT
Start: 2024-02-28 | End: 2024-02-28

## 2024-02-28 RX ORDER — ONDANSETRON 4 MG/1
4 TABLET, FILM COATED ORAL EVERY 8 HOURS PRN
Qty: 10 TABLET | Refills: 0 | Status: SHIPPED | OUTPATIENT
Start: 2024-02-28

## 2024-02-28 RX ORDER — POLYETHYLENE GLYCOL 3350 17 G/17G
17 POWDER, FOR SOLUTION ORAL 2 TIMES DAILY
Qty: 238 G | Refills: 0 | Status: SHIPPED | OUTPATIENT
Start: 2024-02-28 | End: 2024-03-06

## 2024-02-28 RX ORDER — ACETAMINOPHEN 325 MG/1
650 TABLET ORAL EVERY 6 HOURS PRN
Status: DISCONTINUED | OUTPATIENT
Start: 2024-02-28 | End: 2024-02-29 | Stop reason: HOSPADM

## 2024-02-28 RX ORDER — ALBUTEROL SULFATE 2.5 MG/3ML
2.5 SOLUTION RESPIRATORY (INHALATION) EVERY 4 HOURS PRN
Status: DISCONTINUED | OUTPATIENT
Start: 2024-02-28 | End: 2024-02-29 | Stop reason: HOSPADM

## 2024-02-28 RX ORDER — MELOXICAM 15 MG/1
15 TABLET ORAL DAILY
Qty: 14 TABLET | Refills: 0 | Status: SHIPPED | OUTPATIENT
Start: 2024-02-28

## 2024-02-28 RX ORDER — MAGNESIUM HYDROXIDE 1200 MG/15ML
LIQUID ORAL AS NEEDED
Status: DISCONTINUED | OUTPATIENT
Start: 2024-02-28 | End: 2024-02-28 | Stop reason: HOSPADM

## 2024-02-28 RX ORDER — ONDANSETRON 2 MG/ML
4 INJECTION INTRAMUSCULAR; INTRAVENOUS ONCE AS NEEDED
Status: DISCONTINUED | OUTPATIENT
Start: 2024-02-28 | End: 2024-02-29 | Stop reason: HOSPADM

## 2024-02-28 RX ORDER — HYDRALAZINE HYDROCHLORIDE 20 MG/ML
5 INJECTION INTRAMUSCULAR; INTRAVENOUS
Status: DISCONTINUED | OUTPATIENT
Start: 2024-02-28 | End: 2024-02-28 | Stop reason: HOSPADM

## 2024-02-28 RX ORDER — ASPIRIN 81 MG/1
81 TABLET ORAL EVERY 12 HOURS SCHEDULED
Status: DISCONTINUED | OUTPATIENT
Start: 2024-02-29 | End: 2024-02-29 | Stop reason: HOSPADM

## 2024-02-28 RX ORDER — HYDROCHLOROTHIAZIDE 25 MG/1
25 TABLET ORAL DAILY
Status: DISCONTINUED | OUTPATIENT
Start: 2024-02-28 | End: 2024-02-29 | Stop reason: HOSPADM

## 2024-02-28 RX ORDER — ONDANSETRON 2 MG/ML
INJECTION INTRAMUSCULAR; INTRAVENOUS AS NEEDED
Status: DISCONTINUED | OUTPATIENT
Start: 2024-02-28 | End: 2024-02-28 | Stop reason: SURG

## 2024-02-28 RX ORDER — LABETALOL HYDROCHLORIDE 5 MG/ML
5 INJECTION, SOLUTION INTRAVENOUS
Status: DISCONTINUED | OUTPATIENT
Start: 2024-02-28 | End: 2024-02-28 | Stop reason: HOSPADM

## 2024-02-28 RX ADMIN — SODIUM CHLORIDE, POTASSIUM CHLORIDE, SODIUM LACTATE AND CALCIUM CHLORIDE 500 ML: 600; 310; 30; 20 INJECTION, SOLUTION INTRAVENOUS at 12:05

## 2024-02-28 RX ADMIN — CEFAZOLIN SODIUM 2000 MG: 2 INJECTION, SOLUTION INTRAVENOUS at 21:52

## 2024-02-28 RX ADMIN — MIDAZOLAM 2 MG: 1 INJECTION INTRAMUSCULAR; INTRAVENOUS at 12:10

## 2024-02-28 RX ADMIN — HYDROMORPHONE HYDROCHLORIDE 0.5 MG: 1 INJECTION, SOLUTION INTRAMUSCULAR; INTRAVENOUS; SUBCUTANEOUS at 16:35

## 2024-02-28 RX ADMIN — HYDROCODONE BITARTRATE AND ACETAMINOPHEN 1 TABLET: 7.5; 325 TABLET ORAL at 21:55

## 2024-02-28 RX ADMIN — HYDROMORPHONE HYDROCHLORIDE 0.5 MG: 1 INJECTION, SOLUTION INTRAMUSCULAR; INTRAVENOUS; SUBCUTANEOUS at 16:05

## 2024-02-28 RX ADMIN — FAMOTIDINE 20 MG: 10 INJECTION INTRAVENOUS at 12:03

## 2024-02-28 RX ADMIN — ONDANSETRON 4 MG: 2 INJECTION INTRAMUSCULAR; INTRAVENOUS at 15:08

## 2024-02-28 RX ADMIN — SODIUM CHLORIDE, POTASSIUM CHLORIDE, SODIUM LACTATE AND CALCIUM CHLORIDE: 600; 310; 30; 20 INJECTION, SOLUTION INTRAVENOUS at 13:59

## 2024-02-28 RX ADMIN — ACETAMINOPHEN 1000 MG: 10 INJECTION, SOLUTION INTRAVENOUS at 14:15

## 2024-02-28 RX ADMIN — Medication 50 MG: at 14:15

## 2024-02-28 RX ADMIN — SUGAMMADEX 200 MG: 100 INJECTION, SOLUTION INTRAVENOUS at 15:24

## 2024-02-28 RX ADMIN — ROPIVACAINE HYDROCHLORIDE 20 ML: 5 INJECTION EPIDURAL; INFILTRATION; PERINEURAL at 12:25

## 2024-02-28 RX ADMIN — SODIUM CHLORIDE, POTASSIUM CHLORIDE, SODIUM LACTATE AND CALCIUM CHLORIDE: 600; 310; 30; 20 INJECTION, SOLUTION INTRAVENOUS at 14:48

## 2024-02-28 RX ADMIN — SCOPALAMINE 1 PATCH: 1 PATCH, EXTENDED RELEASE TRANSDERMAL at 12:33

## 2024-02-28 RX ADMIN — ROCURONIUM BROMIDE 50 MG: 10 INJECTION, SOLUTION INTRAVENOUS at 14:08

## 2024-02-28 RX ADMIN — CEFAZOLIN SODIUM 2 G: 2 INJECTION, SOLUTION INTRAVENOUS at 13:53

## 2024-02-28 RX ADMIN — OXYCODONE HYDROCHLORIDE AND ACETAMINOPHEN 1 TABLET: 7.5; 325 TABLET ORAL at 16:35

## 2024-02-28 RX ADMIN — LIDOCAINE HYDROCHLORIDE 100 MG: 20 INJECTION, SOLUTION EPIDURAL; INFILTRATION; INTRACAUDAL; PERINEURAL at 14:08

## 2024-02-28 RX ADMIN — DEXAMETHASONE SODIUM PHOSPHATE 12 MG: 4 INJECTION, SOLUTION INTRAMUSCULAR; INTRAVENOUS at 14:15

## 2024-02-28 RX ADMIN — VANCOMYCIN HYDROCHLORIDE 1750 MG: 10 INJECTION, POWDER, LYOPHILIZED, FOR SOLUTION INTRAVENOUS at 12:23

## 2024-02-28 RX ADMIN — PREGABALIN 150 MG: 75 CAPSULE ORAL at 11:42

## 2024-02-28 RX ADMIN — FENTANYL CITRATE 100 MCG: 50 INJECTION, SOLUTION INTRAMUSCULAR; INTRAVENOUS at 14:06

## 2024-02-28 RX ADMIN — MAGNESIUM SULFATE HEPTAHYDRATE 2 G: 500 INJECTION, SOLUTION INTRAMUSCULAR; INTRAVENOUS at 14:15

## 2024-02-28 RX ADMIN — TRANEXAMIC ACID 1000 MG: 100 INJECTION INTRAVENOUS at 14:57

## 2024-02-28 RX ADMIN — PROPOFOL 250 MG: 10 INJECTION, EMULSION INTRAVENOUS at 14:08

## 2024-02-28 RX ADMIN — FENTANYL CITRATE 50 MCG: 50 INJECTION, SOLUTION INTRAMUSCULAR; INTRAVENOUS at 12:10

## 2024-02-28 RX ADMIN — PROPOFOL 200 MCG/KG/MIN: 10 INJECTION, EMULSION INTRAVENOUS at 14:15

## 2024-02-28 RX ADMIN — DROPERIDOL 0.62 MG: 2.5 INJECTION, SOLUTION INTRAMUSCULAR; INTRAVENOUS at 14:15

## 2024-02-28 RX ADMIN — DEXAMETHASONE SODIUM PHOSPHATE 4 MG: 4 INJECTION, SOLUTION INTRAMUSCULAR; INTRAVENOUS at 12:25

## 2024-02-28 RX ADMIN — MELOXICAM 15 MG: 15 TABLET ORAL at 11:42

## 2024-02-28 NOTE — PLAN OF CARE
Goal Outcome Evaluation:            A&Ox4. Slightly drowsy. VSS. NC 2L. Dressing CDI. Hemovac in place. Assist x 1. Voided, BRP. Plans to D/C home when ready. Educated on DVT prophylaxis.

## 2024-02-28 NOTE — ANESTHESIA PROCEDURE NOTES
Airway  Urgency: elective    Date/Time: 2/28/2024 2:13 PM  Airway not difficult    General Information and Staff    Patient location during procedure: OR  Anesthesiologist: Poncho Aceves MD  CRNA/CAA: Jenise Ornelas CRNA    Indications and Patient Condition  Indications for airway management: airway protection    Preoxygenated: yes  MILS maintained throughout  Mask difficulty assessment: 2 - vent by mask + OA or adjuvant +/- NMBA    Final Airway Details  Final airway type: endotracheal airway      Successful airway: ETT  Cuffed: yes   Successful intubation technique: direct laryngoscopy  Facilitating devices/methods: intubating stylet and cricoid pressure  Endotracheal tube insertion site: oral  Blade: Bradley  Blade size: 2  ETT size (mm): 7.5  Cormack-Lehane Classification: grade I - full view of glottis  Placement verified by: chest auscultation and capnometry   Cuff volume (mL): 7  Measured from: teeth  ETT/EBT  to teeth (cm): 20  Number of attempts at approach: 1  Assessment: lips, teeth, and gum same as pre-op and atraumatic intubation

## 2024-02-28 NOTE — ANESTHESIA POSTPROCEDURE EVALUATION
Patient: Ce YEBOAH Bernardino    Procedure Summary       Date: 02/28/24 Room / Location: [facility] OR [address] [place] [address] [facility]    Anesthesia Start: 1357 Anesthesia Stop:     Procedure: TOTAL KNEE ARTHROPLASTY (Right: Knee) Diagnosis:       Primary osteoarthritis of right knee      (Primary osteoarthritis of right knee [M17.11])    Surgeons: James Coy MD Provider: Poncho Aceves MD    Anesthesia Type: general ASA Status: 3            Anesthesia Type: general    Vitals  Vitals Value Taken Time   /81 02/28/24 1700   Temp 36.5 °C (97.7 °F) 02/28/24 1552   Pulse 96 02/28/24 1711   Resp 13 02/28/24 1645   SpO2 98 % 02/28/24 1711   Vitals shown include unfiled device data.        Post Anesthesia Care and Evaluation    Level of consciousness: awake  Pain management: adequate    Airway patency: patent  Anesthetic complications: No anesthetic complications  PONV Status: none  Cardiovascular status: acceptable  Respiratory status: acceptable  Hydration status: acceptable

## 2024-02-28 NOTE — ANESTHESIA PREPROCEDURE EVALUATION
Anesthesia Evaluation     NPO Solid Status: > 8 hours  NPO Liquid Status: > 8 hours           Airway   Mallampati: I  No difficulty expected  Dental      Pulmonary    Cardiovascular   Exercise tolerance: good (4-7 METS)        Neuro/Psych  (+) numbness, psychiatric history  GI/Hepatic/Renal/Endo    (+) morbid obesity, GERD    Musculoskeletal     Abdominal    Substance History      OB/GYN          Other   arthritis,                 Anesthesia Plan    ASA 3     general     ([facility] for [facility]  )  intravenous induction     Anesthetic plan, risks, benefits, and alternatives have been provided, discussed and informed consent has been obtained with: patient.    CODE STATUS:

## 2024-02-28 NOTE — OP NOTE
Name: Ce Lebron    YOB: 1970    DATE OF SURGERY: 2/28/2024    PREOPERATIVE DIAGNOSIS: Right knee end-stage osteoarthritis    POSTOPERATIVE DIAGNOSIS: Right knee end-stage osteoarthritis    PROCEDURE PERFORMED: Right total knee replacement     SURGEON: James Coy M.D.    ASSISTANT: HEATHER SAWYER    A surgical assistant was integral in ensuring a successful outcome with this procedure.  The assistant was utilized to assist in positioning the patient, draping the patient, was used throughout the case to provide with retraction of tissues, suctioning of blood and body fluids for visualization, positioning of the extremity to allow for proper exposure so that I could perform the procedure.  Without the use of a surgical assistant during this procedure I feel that the outcome may have been compromised or would have been suboptimal or at risk for complications.    IMPLANTS: [employer]:     Implant Name Type Inv. Item Serial No.  Lot No. LRB No. Used Action   CMT BONE PALACOS R HI/VISC 1X40 - [id number] Implant CMT BONE PALACOS R HI/VISC 1X40  [facility] [id number] Right 1 Implanted   DEV CONTRL TISS STRATAFIX SYMM PDS PLUS LYNN CT-1 60CM - [id number] Implant DEV CONTRL TISS STRATAFIX SYMM PDS PLUS LYNN CT-1 60CM  [employer] [id number] Right 1 Implanted   DEV CONTRL TISS STRATAFIXSPIRALMNCRYL PLSPS2 REV3/0 45CM - [id number] Implant DEV CONTRL TISS STRATAFIXSPIRALMNCRYL PLSPS2 REV3/0 45CM  [employer]  DIV [employer] J [employer] J [id number] Right 1 Implanted   INSRT ART/KN LEGION PS HF XLPE SZ3TO4 11MM - [id number] Implant INSRT ART/KN LEGION PS HF XLPE SZ3TO4 11MM  [employer] [id number] Right 1 Implanted   PAT GEN2 BICONVEX 15Y06VR - [id number] Implant PAT GEN2 BICONVEX [id number]  [employer] [id number] Right 1 Implanted   COMP FEM/KN LEGION OXINIUM PS SZ4 RT - [id number] Implant COMP FEM/KN LEGION OXINIUM PS SZ4 RT  [employer] [id number] Right 1 Implanted   BASE TIB/KN  GEN2 NONPOR TI SZ3 RT - [id number] Implant BASE TIB/KN GEN2 NONPOR TI SZ3 RT  [employer] [id number] Right 1 Implanted       Estimated Blood Loss: 200cc  Specimens : none  Complications: none    DESCRIPTION OF PROCEDURE: The patient was taken to the operating room and placed in the supine position. A sequential compression device was carefully placed on the non-operative leg. Preoperative antibiotics were administered. Surgical time out was performed. After adequate induction of anesthesia, the leg was prepped and draped in the usual sterile fashion, exsanguinated with an Esmarch bandage and the tourniquet inflated to 250 mmHg. A midline incision was performed followed by a medial parapatellar arthrotomy. The patella was subluxed laterally.  A portion of the fat pad, ACL, and anterior horns of the meniscus were excised.  A drill hole was then placed in the center of the femoral canal in line with the canal.  It was irrigated and suctioned.  The intramedullary toya was then placed and a 5 degree distal valgus cut was performed after the block pinned in place appropriately.  Cut surface was then removed.  The sizing and rotation guide was then placed and seated appropriately.  It was sized and then the drill holes for the 4-in-1 cutting guide were placed in 3 degrees of external rotation based off of the posterior condyles.  The 4-in-1 cutting block was then placed and the femoral cuts were performed.  The excess bone was removed and the cut surfaces looked good.  At this point we placed the retractors around the proximal tibia and a slight release of the PCL fibers off of the posterior proximal tibia was performed.  We used the extramedullary tibial alignment guide and it was aligned appropriately and then the depth was set and the block pinned in place.  The tibial cut was then performed and the alignment guide was removed.  The tibial cut was removed and the cut surface looked good.  The posterior horns of the  menisci were then removed as well as the posterior osteophytes.  Flexion extension blocks were then used to check the balance of the knee. The tibial cut surface was then sized with the sizing templates and the tibial and femoral trial were then placed. The knee was placed in full extension and then the tibial tray rotation was then matched to the femoral rotation and marked.    Attention was then placed to the patella. The patella was noted to track centrally through range of motion. The patella was then sized with the trials. The thickness of the patella was then measured. The patella was resurfaced and the surrounding osteophytes were removed. The preoperative thickness was reproduced. The patella tracked centrally through range of motion.  We then checked the balance with the trial implants in place and there was excellent medial lateral and flexion-extension balance.     At this point all trial components were removed, the knee was copiously irrigated with pulsed lavage, and the knee was injected with anesthetic cocktail solution. The cut surfaces were then dried with clean lap sponges, and the components were cemented tibia, followed by femur, then patella. The knee was held in full extension and all excess cement was removed. The knee was held still until the cement had completely hardened. We then placed the trial polyethylene spacer which resulted in full extension and excellent flexion-extension balance. We placed the final polyethylene spacer.   The knee was then copiously irrigated. The tourniquet was then released. There was excellent hemostasis. We placed a one-eighth inch Hemovac drain. We closed the knee in multiple layers in standard fashion. Sterile dressing were applied. At the end of the case, the sponge and needle counts were reported as being correct. There were no known complications. The patient was then transported to the recovery room.      James Coy M.D.

## 2024-02-28 NOTE — ANESTHESIA PROCEDURE NOTES
Peripheral Block      Patient reassessed immediately prior to procedure    Patient location during procedure: pre-op  Start time: 2/28/2024 12:20 PM  Stop time: 2/28/2024 12:25 PM  Reason for block: procedure for pain, at surgeon's request and post-op pain management  Performed by  Anesthesiologist: Poncho Aceves MD  Preanesthetic Checklist  Completed: patient identified, IV checked, site marked, risks and benefits discussed, surgical consent, monitors and equipment checked, pre-op evaluation and timeout performed  Prep:  Pt Position: supine  Sterile barriers:cap, gloves, sterile barriers, washed/disinfected hands and mask  Prep: ChloraPrep  Patient monitoring: blood pressure monitoring, continuous pulse oximetry and EKG  Procedure    Sedation: yes  Performed under: local infiltration  Guidance:ultrasound guided  Images:still images obtained, printed/placed on chart    Laterality:right  Block Type:adductor canal block  Injection Technique:single-shot  Needle Type:echogenic  Resistance on Injection: none    Medications Used: dexamethasone (DECADRON) injection - Injection   4 mg - 2/28/2024 12:25:00 PM  ropivacaine (NAROPIN) 0.5 % injection - Injection   20 mL - 2/28/2024 12:25:00 PM      Post Assessment  Injection Assessment: negative aspiration for heme, no paresthesia on injection and incremental injection  Patient Tolerance:comfortable throughout block  Complications:no  Additional Notes  USG used to verify needle placement and medication administration

## 2024-02-29 ENCOUNTER — HOME HEALTH ADMISSION (OUTPATIENT)
Dept: HOME HEALTH SERVICES | Facility: HOME HEALTHCARE | Age: 54
End: 2024-02-29
Payer: COMMERCIAL

## 2024-02-29 ENCOUNTER — DOCUMENTATION (OUTPATIENT)
Dept: HOME HEALTH SERVICES | Facility: HOME HEALTHCARE | Age: 54
End: 2024-02-29
Payer: COMMERCIAL

## 2024-02-29 ENCOUNTER — READMISSION MANAGEMENT (OUTPATIENT)
Dept: CALL CENTER | Facility: HOSPITAL | Age: 54
End: 2024-02-29
Payer: COMMERCIAL

## 2024-02-29 VITALS
HEIGHT: 64 IN | BODY MASS INDEX: 43.28 KG/M2 | SYSTOLIC BLOOD PRESSURE: 110 MMHG | DIASTOLIC BLOOD PRESSURE: 70 MMHG | WEIGHT: 253.53 LBS | OXYGEN SATURATION: 95 % | RESPIRATION RATE: 16 BRPM | TEMPERATURE: 98.5 F | HEART RATE: 110 BPM

## 2024-02-29 LAB
HCT VFR BLD AUTO: 35.3 % (ref 34–46.6)
HGB BLD-MCNC: 11.4 G/DL (ref 12–15.9)

## 2024-02-29 PROCEDURE — G0378 HOSPITAL OBSERVATION PER HR: HCPCS

## 2024-02-29 PROCEDURE — 99024 POSTOP FOLLOW-UP VISIT: CPT | Performed by: NURSE PRACTITIONER

## 2024-02-29 PROCEDURE — 25010000002 CEFAZOLIN IN DEXTROSE 2-4 GM/100ML-% SOLUTION: Performed by: NURSE PRACTITIONER

## 2024-02-29 PROCEDURE — 85018 HEMOGLOBIN: CPT | Performed by: NURSE PRACTITIONER

## 2024-02-29 PROCEDURE — 85014 HEMATOCRIT: CPT | Performed by: NURSE PRACTITIONER

## 2024-02-29 PROCEDURE — 97162 PT EVAL MOD COMPLEX 30 MIN: CPT

## 2024-02-29 PROCEDURE — 97530 THERAPEUTIC ACTIVITIES: CPT

## 2024-02-29 RX ADMIN — PANTOPRAZOLE SODIUM 40 MG: 40 TABLET, DELAYED RELEASE ORAL at 09:00

## 2024-02-29 RX ADMIN — ASPIRIN 81 MG: 81 TABLET, COATED ORAL at 08:59

## 2024-02-29 RX ADMIN — HYDROCHLOROTHIAZIDE 25 MG: 25 TABLET ORAL at 09:00

## 2024-02-29 RX ADMIN — SPIRONOLACTONE 25 MG: 25 TABLET ORAL at 09:00

## 2024-02-29 RX ADMIN — HYDROCODONE BITARTRATE AND ACETAMINOPHEN 1 TABLET: 7.5; 325 TABLET ORAL at 03:38

## 2024-02-29 RX ADMIN — HYDROCODONE BITARTRATE AND ACETAMINOPHEN 2 TABLET: 7.5; 325 TABLET ORAL at 09:00

## 2024-02-29 RX ADMIN — CEFAZOLIN SODIUM 2000 MG: 2 INJECTION, SOLUTION INTRAVENOUS at 05:37

## 2024-02-29 NOTE — PLAN OF CARE
Goal Outcome Evaluation:  Plan of Care Reviewed With: patient        Progress: improving  Outcome Evaluation: Pt is a 52 y/o F POD1 R TKA. Pt reports she is independent without an AD, owns a RW, lives alone in a house with 3 GABRIEL and a flight upstairs to her bed/bath, but will be staying with her parents/family the first night and will have family assist prn thereafter as her family lives next door. Today, pt is Mod-I for STS/transfers w/ RW and ambulation of 150' x2 with RW. Pt was SBA for ascending/descending 12 steps with BHR in a non-reciprocal step pattern. Pt had no knee buckling or LOB during the session, but did experience mild fatigue and increase in pain; requested pain meds at end of session, RN made aware. Pt also performed TKA protocol ther-ex prior to mobility. Pt v/u of all education during session including HEP, cold pack wear times, RW safety/management, activity recs and safety with stair navigation/sequencing. Pt reports no concerns or questions for return home. Pt presents below her baseline with impaired ROM, decreased strength and decreased endurance, thus will benefit from skilled PT services. Rec home w/ family assist and HH PT.      Anticipated Discharge Disposition (PT): home with home health, home with assist

## 2024-02-29 NOTE — DISCHARGE SUMMARY
Patient Name: Ce Lebron  Patient YOB: 1970    Date of Admission:  2/28/2024  Date of Discharge:  2/29/2024  Discharge Diagnosis: OK ARTHRP KNE CONDYLE&PLATU MEDIAL&LAT COMPARTMENTS [05699] (TOTAL KNEE ARTHROPLASTY)  Presenting Problem/History of Present Illness: Primary osteoarthritis of right knee [M17.11]  OA (osteoarthritis) of knee [M17.9]  Admitting Physician: Dr James Coy  Consults:   Consults       No orders found for last 30 day(s).            DETAILS OF HOSPITAL STAY:  Patient is a 53 y.o. female was admitted to the floor following the above procedure and underwent an uncomplicated hospital stay.  Patient did well with physical therapy and was ambulating well without problems.  On the day of discharge the wound was clean, dry and intact and calf was soft and nontender and Homans sign was negative.  Patient was tolerating  without problems.  Patient will be discharged home.    Condition on Discharge:  Stable    Vital Signs  Temp:  [97.5 °F (36.4 °C)-98.5 °F (36.9 °C)] 98.5 °F (36.9 °C)  Heart Rate:  [] 110  Resp:  [13-20] 16  BP: (110-159)/() 110/70    LABS:      Admission on 02/28/2024   Component Date Value Ref Range Status    Hemoglobin 02/29/2024 11.4 (L)  12.0 - 15.9 g/dL Final    Hematocrit 02/29/2024 35.3  34.0 - 46.6 % Final       No results found.    Discharge Medications     Discharge Medications        New Medications        Instructions Start Date   Aspirin Low Dose 81 MG EC tablet  Generic drug: aspirin   Take 1 tablet by mouth 2 (Two) Times a Day for 14 days, THEN 1 tablet Daily for 28 days.   Start Date: February 28, 2024     HYDROcodone-acetaminophen 7.5-325 MG per tablet  Commonly known as: NORCO   1 tablet, Oral, Every 4 Hours PRN      polyethylene glycol 17 GM/SCOOP powder  Commonly known as: MIRALAX   17 g, Oral, 2 Times Daily             Changes to Medications        Instructions Start Date   meloxicam 15 MG tablet  Commonly known as: MOBIC  What changed:    medication strength  how much to take   15 mg, Oral, Daily             Continue These Medications        Instructions Start Date   albuterol sulfate  (90 Base) MCG/ACT inhaler  Commonly known as: ProAir HFA   1 puff, Inhalation, Every 4 Hours PRN      azelaic acid 15 % gel  Commonly known as: AZELEX   Apply a small amount topically to the appropriate area as directed 2 (Two) Times a Day.      cyclobenzaprine 10 MG tablet  Commonly known as: FLEXERIL   10 mg, Oral, 3 Times Daily PRN      desvenlafaxine 50 MG 24 hr tablet  Commonly known as: Pristiq   50 mg, Oral, Daily      ondansetron 4 MG tablet  Commonly known as: Zofran   4 mg, Oral, Every 8 Hours PRN      pantoprazole 40 MG EC tablet  Commonly known as: Protonix   40 mg, Oral, Daily PRN      spironolactone-hydrochlorothiazide 25-25 MG tablet  Commonly known as: Aldactazide   1 tablet, Oral, Daily             Stop These Medications      cephalexin 500 MG capsule  Commonly known as: KEFLEX     ibuprofen 800 MG tablet  Commonly known as: ADVIL,MOTRIN              Discharge Instructions: Patient is to continue with physical therapy exercises daily and continue working with the physical therapist as ordered. Patient may weight bear as tolerated. Apply ice regularly. Patient may ice for long periods of time as long as ice is not directly on the skin. Patient instructed on frequent calf pumping exercises.  Patient also instructed on incentive spirometer during hospitalization and encouraged to continue to use at home regularly.    Dressing: The dressing is designed to be left in place until you return to the office in 2 weeks.  The suction unit should stop functioning at 7 days and the green light will switch to yellow.  At that point the suction unit and tubing can be disconnected at the port closest to the dressing.  The suction unit and tubing may be discarded.  You may shower immediately upon return home, you will need to turn the pump off by depressing  the orange button once and then you may disconnect the pump and tubing at the connection port.  After showering, shake off the excess water and reattach the tubing.  Restart the pump by depressing the orange button one time and you will notice the green light flashing again.  If the dressing becomes dislodged or saturated it should be changed. Please refer to the [facility] information sheet if you have any questions about the dressing.  If you have a home health nurse or therapist they can be contacted to assist with dressing change or repair. You may also call the [facility] dressing hotline for questions related to the dressing ([phone]). If there are still other problems or questions related to the dressing despite these measures then you can contact Holly at our office [phone].  If for some reason the [facility] dressing is removed, after 7 days the wound can be gently cleaned with antibacterial soap then allowed to dry and covered with a dry sterile dressing. The wound should be covered at all times except while showering.  Patient may change dressings daily and prn using sterile 4x4 and paper tape, and should call if any unusual drainage, redness or swelling.*  Follow up appointment in 2 weeks - patient to call the office at [phone] to schedule.  Patient will be discharged on aspirin 81mg BID x 2 weeks, then daily x 4 weeks    Discharge Diagnosis:    OA (osteoarthritis) of knee      Follow-up Appointments  Future Appointments   Date Time Provider Department Center   3/12/2024  2:20 PM James Coy MD [facility] [address] [facility]   3/18/2024  9:00 AM Debby Long PT [facility]   5/2/2024  3:00 PM Xiang Fitzgerald APRN [facility] [facility]          NICK Osborn  02/29/24  07:50 EST

## 2024-02-29 NOTE — THERAPY EVALUATION
Patient Name: Ce YEBOAH [facility]  : 1970    MRN: [id number]                              Today's Date: 2024       Admit Date: 2024    Visit Dx:     ICD-10-CM ICD-9-CM   1. S/P total knee replacement, right  Z96.651 V43.65   2. Primary osteoarthritis of right knee  M17.11 715.16     Patient Active Problem List   Diagnosis    Low back pain    Gastroesophageal reflux disease    History of tobacco use    Prediabetes    Vitamin D deficiency    Episode of recurrent major depressive disorder    Primary osteoarthritis of left knee    OA (osteoarthritis) of knee    Lumbar radiculopathy     Past Medical History:   Diagnosis Date    Allergic     Anemia     Ankle swelling     Anxiety and depression     Arthritis     At risk for sleep apnea     5    Back problem     LOW BACK PAIN, DISC ISSUES    Cervical disc disorder     Eczema     uses hydrocortisone cream prn    GERD (gastroesophageal reflux disease)     History of bronchitis     prn inhaler    History of transfusion     no reaction    Knee problem     Knee swelling     Left knee pain     Loose stools     states has happened after starting pristiq    Low back pain     Lumbar radiculopathy 2023    Lumbosacral disc disease     Methicillin resistant Staph aureus culture positive     HX 10-15 YEARS AGO, NASAL, TREATED [facility]    Obesity     PONV (postoperative nausea and vomiting)     Prediabetes     Rosacea     S/P cervical spinal fusion     Shortness of breath on exertion     FROM DECONDITIONING FROM KNEE PAIN    SVT (supraventricular tachycardia)     HX, ANXIETY RELATED PER PT    Tendinitis of right elbow      Past Surgical History:   Procedure Laterality Date    ADENOIDECTOMY      ANTERIOR CERVICAL DISCECTOMY W/ FUSION      C6-7 acdf    APPENDECTOMY  2012     SECTION      COLONOSCOPY      DIAGNOSTIC LAPAROSCOPY, SALPINGO OOPHORECTOMY LAPAROSCOPIC      ENDOMETRIAL ABLATION  10/2012    EPIDURAL BLOCK       HYSTERECTOMY      LAPAROSCOPIC CHOLECYSTECTOMY  03/04/2005    LASIK Bilateral 2000    TONSILLECTOMY      TOTAL KNEE ARTHROPLASTY Left 05/03/2023    Procedure: TOTAL KNEE ARTHROPLASTY;  Surgeon: James Coy MD;  Location: [facility] [place];  Service: Orthopedics;  Laterality: Left;    VENTRAL HERNIA REPAIR  07/21/2016      General Information       Row Name 02/29/24 0855          Physical Therapy Time and Intention    Document Type evaluation  -MG     Mode of Treatment individual therapy;physical therapy  -MG       Row Name 02/29/24 0855          General Information    Patient Profile Reviewed yes  -MG     Prior Level of Function independent:  No AD. Owns RW.  -MG     Existing Precautions/Restrictions no known precautions/restrictions  -MG     Barriers to Rehab none identified  -MG       Row Name 02/29/24 0855          Living Environment    People in Home alone  Will be staying with parents first night home. Parents live next door and other family lives on same street.  -MG       Row Name 02/29/24 0855          Home Main Entrance    Number of Stairs, Main Entrance three  -MG     Stair Railings, Main Entrance railings safe and in good condition;railing on right side (ascending)  -MG       Row Name 02/29/24 0855          Stairs Within Home, Primary    Stairs, Within Home, Primary Able to stay on main floor, state she sleeps in her recliner the first week. Bed and full bath upstairs.  -MG     Number of Stairs, Within Home, Primary twelve  -MG     Stair Railings, Within Home, Primary railings safe and in good condition;railing on right side (ascending)  -MG       Row Name 02/29/24 0855          Cognition    Orientation Status (Cognition) oriented x 4  -MG       Row Name 02/29/24 0855          Safety Issues, Functional Mobility    Impairments Affecting Function (Mobility) endurance/activity tolerance;range of motion (ROM);pain  -MG     Comment, Safety Issues/Impairments (Mobility) Gait belt and non-skid socks donned.   -MG               User Key  (r) = Recorded By, (t) = Taken By, (c) = Cosigned By      Initials Name Provider Type    MG Violet Dill, PORTIA Physical Therapist                   Mobility       Row Name 02/29/24 0857          Sit-Stand Transfer    Sit-Stand [place] (Transfers) standby assist;modified independence  -MG     Assistive Device (Sit-Stand Transfers) walker, front-wheeled  -MG     Comment, (Sit-Stand Transfer) x1 recliner, x1 standard chair.  -MG       Row Name 02/29/24 0857          Gait/Stairs (Locomotion)    [place] Level (Gait) standby assist;modified independence  -MG     Assistive Device (Gait) walker, front-wheeled  -MG     Distance in Feet (Gait) 150  x2  -MG     Deviations/Abnormal Patterns (Gait) antalgic;gait speed decreased  -MG     Bilateral Gait Deviations forward flexed posture  -MG     Right Sided Gait Deviations weight shift ability decreased  -MG     [place] Level (Stairs) contact guard;stand by assist  -MG     Handrail Location (Stairs) both sides  -MG     Number of Steps (Stairs) 12  -MG     Ascending Technique (Stairs) step-to-step  -MG     Descending Technique (Stairs) step-to-step  -MG     Stairs, Impairments pain  -MG     Comment, (Gait/Stairs) Pt ambulating in hallway demoing slow, but steady pace with step-through gait mechanics. Pt ascended/descended 12 steps w/ BHR in a non-reciprocal step pattern. No knee buckling or LOB, mild faitgue and increase in pain.  -MG               User Key  (r) = Recorded By, (t) = Taken By, (c) = Cosigned By      Initials Name Provider Type    MG Violet Dill, PORTIA Physical Therapist                   Obj/Interventions       Row Name 02/29/24 0859          Range of Motion Comprehensive    General Range of Motion lower extremity range of motion deficits identified  -MG     Comment, General Range of Motion LLE WFL. R ankle/hip WFL. R knee 0-95  -MG       Row Name 02/29/24 0859          Strength Comprehensive (MMT)    General Manual Muscle  Testing (MMT) Assessment lower extremity strength deficits identified  -MG     Comment, General Manual Muscle Testing (MMT) Assessment Generalized post-op weakness. Grossly 4+/5.  -MG       Row Name 02/29/24 0859          Motor Skills    Therapeutic Exercise other (see comments)  TKA protocol ther-ex x15  -MG       Row Name 02/29/24 0859          Balance    Comment, Balance Good balance throughout. SBA progressing to Mod-I w/ RW. No knee buckling.  -MG       Row Name 02/29/24 0859          Sensory Assessment (Somatosensory)    Sensory Assessment (Somatosensory) sensation intact  Numbness at surgical site.  -MG               User Key  (r) = Recorded By, (t) = Taken By, (c) = Cosigned By      Initials Name Provider Type    MG Violet Dill, PT Physical Therapist                   Goals/Plan       Row Name 02/29/24 0904          Bed Mobility Goal 1 (PT)    Activity/Assistive Device (Bed Mobility Goal 1, PT) sit to supine/supine to sit  -MG     [place] Level/Cues Needed (Bed Mobility Goal 1, PT) independent  -MG     Time Frame (Bed Mobility Goal 1, PT) long term goal (LTG);3 days  -MG       Row Name 02/29/24 0904          Transfer Goal 1 (PT)    Activity/Assistive Device (Transfer Goal 1, PT) sit-to-stand/stand-to-sit  -MG     [place] Level/Cues Needed (Transfer Goal 1, PT) modified independence  -MG     Time Frame (Transfer Goal 1, PT) long term goal (LTG);3 days  -MG     Progress/Outcome (Transfer Goal 1, PT) goal met  -MG       Row Name 02/29/24 0904          Gait Training Goal 1 (PT)    Activity/Assistive Device (Gait Training Goal 1, PT) gait (walking locomotion)  -MG     [place] Level (Gait Training Goal 1, PT) modified independence  -MG     Time Frame (Gait Training Goal 1, PT) long term goal (LTG);3 days  -MG     Progress/Outcome (Gait Training Goal 1, PT) goal met  -MG       Row Name 02/29/24 0904          ROM Goal 1 (PT)    ROM Goal 1 (PT) Surgical Knee ROM: 0-95  -MG     Time Frame (ROM Goal  1, PT) long-term goal (LTG);3 days  -MG       Row Name 02/29/24 0904          Stairs Goal 1 (PT)    Activity/Assistive Device (Stairs Goal 1, PT) stairs, all skills  -MG     [place] Level/Cues Needed (Stairs Goal 1, PT) modified independence  -MG     Number of Stairs (Stairs Goal 1, PT) 12  -MG     Time Frame (Stairs Goal 1, PT) 3 days  -MG       Row Name 02/29/24 0904          Therapy Assessment/Plan (PT)    Planned Therapy Interventions (PT) balance training;bed mobility training;gait training;home exercise program;ROM (range of motion);patient/family education;stair training;strengthening;stretching;transfer training;neuromuscular re-education;motor coordination training  -MG               User Key  (r) = Recorded By, (t) = Taken By, (c) = Cosigned By      Initials Name Provider Type    MG Violet Dill, PT Physical Therapist                   Clinical Impression       Row Name 02/29/24 0900          Pain    Pretreatment Pain Rating 0/10 - no pain  Elevated at rest  -MG     Posttreatment Pain Rating 5/10  -MG     Pain Location - Side/Orientation Right  -MG     Pain Location incisional  -MG     Pain Location - knee  -MG     Pain Intervention(s) Medication (See MAR);Ambulation/increased activity;Cold applied;Repositioned;Rest  -MG       Row Name 02/29/24 0900          Plan of Care Review    Plan of Care Reviewed With patient  -MG     Progress improving  -MG     Outcome Evaluation Pt is a 54 y/o F POD1 R TKA. Pt reports she is independent without an AD, owns a RW, lives alone in a house with 3 GABRIEL and a flight upstairs to her bed/bath, but will be staying with her parents/family the first night and will have family assist prn thereafter as her family lives next door. Today, pt is Mod-I for STS/transfers w/ RW and ambulation of 150' x2 with RW. Pt was SBA for ascending/descending 12 steps with BHR in a non-reciprocal step pattern. Pt had no knee buckling or LOB during the session, but did experience mild  fatigue and increase in pain; requested pain meds at end of session, RN made aware. Pt also performed TKA protocol ther-ex prior to mobility. Pt v/u of all education during session including HEP, cold pack wear times, RW safety/management, activity recs and safety with stair navigation/sequencing. Pt reports no concerns or questions for return home. Pt presents below her baseline with impaired ROM, decreased strength and decreased endurance, thus will benefit from skilled PT services. Rec home w/ family assist and HH PT.  -MG       Row Name 02/29/24 0900          Therapy Assessment/Plan (PT)    Rehab Potential (PT) good, to achieve stated therapy goals  -MG     Criteria for Skilled Interventions Met (PT) yes;meets criteria  -MG     Therapy Frequency (PT) daily  -MG       Row Name 02/29/24 0900          Vital Signs    O2 Delivery Pre Treatment room air  -MG     O2 Delivery Intra Treatment room air  -MG     O2 Delivery Post Treatment room air  -MG     Pre Patient Position Sitting  -MG     Intra Patient Position Standing  -MG     Post Patient Position Sitting  -MG       Row Name 02/29/24 0900          Positioning and Restraints    Pre-Treatment Position sitting in chair/recliner  -MG     Post Treatment Position chair  -MG     In Chair notified nsg;reclined;encouraged to call for assist;call light within reach;exit alarm on;legs elevated;RLE elevated  -MG               User Key  (r) = Recorded By, (t) = Taken By, (c) = Cosigned By      Initials Name Provider Type    Violet Fox, PT Physical Therapist                   Outcome Measures       Row Name 02/29/24 0905          How much help from another person do you currently need...    Turning from your back to your side while in flat bed without using bedrails? 4  -MG     Moving from lying on back to sitting on the side of a flat bed without bedrails? 3  -MG     Moving to and from a bed to a chair (including a wheelchair)? 4  -MG     Standing up from a chair using  your arms (e.g., wheelchair, bedside chair)? 4  -MG     Climbing 3-5 steps with a railing? 3  -MG     To walk in hospital room? 4  -MG     AM-PAC 6 Clicks Score (PT) 22  -MG     Highest Level of Mobility Goal 7 --> Walk 25 feet or more  -MG               User Key  (r) = Recorded By, (t) = Taken By, (c) = Cosigned By      Initials Name Provider Type    MG Violet Dill, PT Physical Therapist                                 Physical Therapy Education       Title: PT OT SLP Therapies (Done)       Topic: Physical Therapy (Done)       Point: Mobility training (Done)       Learning Progress Summary             Patient Acceptance, E,TB,D, VU,DU by  at 2/29/2024 0906                         Point: Home exercise program (Done)       Learning Progress Summary             Patient Acceptance, E,TB,D, VU,DU by  at 2/29/2024 0906                         Point: Body mechanics (Done)       Learning Progress Summary             Patient Acceptance, E,TB,D, VU,DU by  at 2/29/2024 0906                         Point: Precautions (Done)       Learning Progress Summary             Patient Acceptance, E,TB,D, VU,DU by  at 2/29/2024 0906                                         User Key       Initials Effective Dates Name Provider Type Discipline     05/24/22 -  Violet Dill, PORTIA Physical Therapist PT                  PT Recommendation and Plan  Planned Therapy Interventions (PT): balance training, bed mobility training, gait training, home exercise program, ROM (range of motion), patient/family education, stair training, strengthening, stretching, transfer training, neuromuscular re-education, motor coordination training  Plan of Care Reviewed With: patient  Progress: improving  Outcome Evaluation: Pt is a 54 y/o F POD1 R TKA. Pt reports she is independent without an AD, owns a RW, lives alone in a house with 3 GABRIEL and a flight upstairs to her bed/bath, but will be staying with her parents/family the first night and will have  family assist prn thereafter as her family lives next door. Today, pt is Mod-I for STS/transfers w/ RW and ambulation of 150' x2 with RW. Pt was SBA for ascending/descending 12 steps with BHR in a non-reciprocal step pattern. Pt had no knee buckling or LOB during the session, but did experience mild fatigue and increase in pain; requested pain meds at end of session, RN made aware. Pt also performed TKA protocol ther-ex prior to mobility. Pt v/u of all education during session including HEP, cold pack wear times, RW safety/management, activity recs and safety with stair navigation/sequencing. Pt reports no concerns or questions for return home. Pt presents below her baseline with impaired ROM, decreased strength and decreased endurance, thus will benefit from skilled PT services. Rec home w/ family assist and HH PT.     Time Calculation:         PT Charges       Row Name 02/29/24 0906             Time Calculation    Start Time 0829  -MG      Stop Time 0854  -MG      Time Calculation (min) 25 min  -MG      PT Received On 02/29/24  -MG      PT - Next Appointment 03/01/24  -MG      PT Goal Re-Cert Due Date 03/07/24  -MG         Time Calculation- PT    Total Timed Code Minutes- PT 13 minute(s)  -MG                User Key  (r) = Recorded By, (t) = Taken By, (c) = Cosigned By      Initials Name Provider Type    MG Violet Dill, PT Physical Therapist                  Therapy Charges for Today       Code Description Service Date Service Provider Modifiers Qty    40465100565 HC PT EVAL MOD COMPLEXITY 2 2/29/2024 Violet Dill, PT GP 1    26874860374 HC PT THERAPEUTIC ACT EA 15 MIN 2/29/2024 Violet Dill, PT GP 1            PT G-Codes  AM-PAC 6 Clicks Score (PT): 22  PT Discharge Summary  Anticipated Discharge Disposition (PT): home with home health, home with assist    Violet Dill PT  2/29/2024

## 2024-02-29 NOTE — PROGRESS NOTES
Patient is discharging today. Spoke to patient who is agreeable to home health and has no current home health,  but has had us in the past. Face sheet information is correct and orders for home health PT in [facility]. Thank you !

## 2024-02-29 NOTE — DISCHARGE PLACEMENT REQUEST
"Freddy Mancilla (53 y.o. Female)       Date of Birth   1970    Social Security Number       Address   [address] [place] [address]    Home Phone   [phone]    MRN   [id number]       [facility]    Marital Status   Single                            Admission Date   2/28/24    Admission Type   Elective    Admitting Provider   James Coy MD    Attending Provider   James Coy MD    Department, Room/Bed   [address], P799/1       Discharge Date       Discharge Dis[place]   [place] [facility]    [place]ination                                 Attending Provider: James Coy MD    Allergies: Avocado, Latex, Sulfa Antibiotics, Adhesive Tape    Isolation: None   Infection: None   Code Status: Prior    Ht: 162.6 cm (64.02\")   Wt: 115 kg (253 lb 8.5 oz)    Admission Cmt: None   Principal Problem: OA (osteoarthritis) of knee [M17.9]                   Active Insurance as of 2/28/2024       Primary Coverage       Payor Plan Insurance Group Employer/Plan Group    [facility] [employer] EMPL[employer] [id number]       Payor Plan Address Payor Plan Phone Number Payor Plan Fax Number Effective Dates    [address] [phone]  4/1/2020 - None Entered    [facility] [id number]         Subscriber Name Subscriber Birth Date Member ID       FREDDY MANCILLA 1970 [id number]                     Emergency Contacts        (Rel.) Home Phone Work Phone Mobile Phone    [facility] Center[facility]aura (Mother) [phone] -- [phone]    BernardinoNelson payne (Son) [phone] -- [phone]    BernardinoGeoff payne (Father) -- -- [phone]    Ana Chanel (Sister) -- -- [phone]            "

## 2024-02-29 NOTE — OUTREACH NOTE
Prep Survey      Flowsheet Row Responses   [facility] facility patient discharged from? [facility]   Is LACE score < 7 ? Yes   Eligibility [facility]   Date of Admission 02/28/24   Date of Discharge 02/29/24   Discharge Disposition Home-Health Care Sv   Discharge diagnosis Total knee arthroplasty   Does the patient have one of the following disease processes/diagnoses(primary or secondary)? Total Joint Replacement   Does the patient have Home health ordered? Yes   What is the Home health agency?  [facility]   Is there a DME ordered? No   Prep survey completed? Yes            DA RIVERA - Registered Nurse

## 2024-02-29 NOTE — PROGRESS NOTES
Continued Stay Note  [facility]     Patient Name: Ce Lebron  MRN: [id number]  Today's Date: 2/29/2024    Admit Date: 2/28/2024        Discharge Plan       Row Name 02/29/24 1516       Plan    Final Discharge Disposition Code 01 - home or self-care    Final Note D/c home with [facility]                   Discharge Codes    No documentation.                 Expected Discharge Date and Time       Expected Discharge Date Expected Discharge Time    Feb 29, 2024               Ana Maria Dior RN

## 2024-02-29 NOTE — PLAN OF CARE
Goal Outcome Evaluation:              Outcome Evaluation: Pt cooperative and compliant with care. Follows Falls precautions and calls for assistance when getting out of bed. Pain is adequately managed by PRN pain meds in placed. Dressing is C,D & I. AMENA drain is working well with green light on.

## 2024-03-01 ENCOUNTER — HOME CARE VISIT (OUTPATIENT)
Dept: HOME HEALTH SERVICES | Facility: HOME HEALTHCARE | Age: 54
End: 2024-03-01
Payer: COMMERCIAL

## 2024-03-01 ENCOUNTER — TRANSITIONAL CARE MANAGEMENT TELEPHONE ENCOUNTER (OUTPATIENT)
Dept: CALL CENTER | Facility: HOSPITAL | Age: 54
End: 2024-03-01
Payer: COMMERCIAL

## 2024-03-01 VITALS
TEMPERATURE: 98.4 F | HEART RATE: 97 BPM | OXYGEN SATURATION: 99 % | SYSTOLIC BLOOD PRESSURE: 118 MMHG | DIASTOLIC BLOOD PRESSURE: 80 MMHG | RESPIRATION RATE: 18 BRPM

## 2024-03-01 PROCEDURE — G0151 HHCP-SERV OF PT,EA 15 MIN: HCPCS

## 2024-03-01 NOTE — OUTREACH NOTE
Call Center TCM Note      Flowsheet Row Responses   [facility] patient discharged from? [place]   Does the patient have one of the following disease processes/diagnoses(primary or secondary)? Total Joint Replacement   Joint surgery performed? Knee   TCM attempt successful? Yes   Call start time 0824   Call end time 0827   Has the patient been back in either the hospital or Emergency Department since discharge? No   Discharge diagnosis Total knee arthroplasty   Does the patient have all medications related to this admission filled (includes all antibiotics, pain medications, etc.) Yes   Is the patient taking all medications as directed (includes completed medication regime)? Yes   Is the patient able to teach back alternate methods of pain control? Ice   Comments No available HOSP DC FU appts that meet TCM guidelines.   Does the patient have an appointment with their PCP within 7-14 days of discharge? No appointments available   Nursing Interventions Routed TCM call to PCP office   What is the Home health agency?  [facility]   Has home health visited the patient within 72 hours of discharge? Unsure   Psychosocial issues? No   Has the patient began therapy sessions (either in the home or as an out patient)? No   Did the patient receive a copy of their discharge instructions? Yes   Nursing interventions Reviewed instructions with patient   What is the patient's perception of their functional status since discharge? Improving  [However in alot of pain as to be expected this am]   Is the patient able to teach back signs and symptoms of infection? Temp >100.4 for 24h or longer, Incisional drainage, Blisters around incision   Is the patient able to teach back how to prevent infection? Eat well-balanced diet   Is the patient able to teach back signs and symptoms of DVT? Area hot to touch, Redness in calf, Swelling in calf, Severe pain in calf, Shortness of breath or chest pain   Is the patient/caregiver able to teach back  the hierarchy of who to call/visit for symptoms/problems? PCP, Specialist, Home health nurse, Urgent Care, ED, 911 Yes   TCM call completed? Yes   Wrap up additional comments Pt reports she is doing ok. Is having Pain as to be expected   Call end time 0836            Rashmi Choi RN    3/1/2024, 08:28 EST

## 2024-03-01 NOTE — Clinical Note
Patient admitted to [facility] PT services 1 week 1; 3 week 2 for ther-ex, gait training, fall prevention, pain/edema management, balance retraining following recent R TKA

## 2024-03-01 NOTE — HOME HEALTH
"REASON FOR REFERRAL: 53  year old  female  presents with complaints of :  right knee pain and difficulty walking following recent R TKA       Home health ordered for: PT    SUBJECTIVE: Patient reports knee recovery is going well so far.  Transitioned home with parents (who live next door) initially but now has returned to own home witn intermittent assist from parents    DIAGNOSIS/FOCUS OF CARE:  2/28 Dr Coy R TKA     PERTINENT MEDICAL HISTORY:    L TKA 5/2023  L foot fracture 2023  Anxiety and depression   Arthritis   Cervical disc disorder 2013   GERD (gastroesophageal reflux disease)   Lumbosacral disc disease 2020   Obesity   Rosacea   S/P cervical spinal fusion   Shortness of breath on exertion   Tendinitis of right elbow      PRIOR LEVEL OF FUNCTION: patient was independent with self care and mobility        PATIENT PHYSICAL THERAPY GOAL(S): \"I want to walk without pain so I can help my parents move to [place]    SOCIAL ENVIRONMENT/ DME /POTENTIAL BARRIERS FOR GOAL ATTAINMENT :  lives alone with pit bull; parents live next door; stairs to enter and stairs to 2nd level fo home; has SPC, rw; elevate toilet seat    SKIN INTEGRITY/ WOUND STATUS:  see wound care screen for details AMENA intact and operable R anterior knee    CODE STATUS:  full    MEDICATION ISSUES/ CONCERNS:  none identified    HOMEBOUND STATUS: yes - see homebound screen for details    PROBLEMS IDENTIFIED: see care plan    FUNCTIONAL STATUS/ FALL RISK/ SAFETY: see physical therapy evaluation/ care plan     ASSESSMENT: expected post op pain/edema.  ambulating in hoe successfully witth aid of RW   _______________________    PLAN FOR NEXT VISIT:   MEDICAL NECESSITY FOR ONGOING SKILLED THERAPY:  Skilled physical therapy is medically necessary for treatment of: gait, transfer, ROM, strength and balance deficits following recent hospitalization for: R TKA .Requires instruction in appropriate progression of exercises; education in fall " prevention/pain/edema management; gait training to reduce reliance on assistive device; balance retraining to prevent falls.  Without skilled physical therapy, patient at risk for: falls, rehospitalization, increased reliance on caregiver, chronic pain,       SPECIFIC INTERVENTIONS AND GOALS TO ADDRESS ON NEXT VISIT:  - progress HEP to include: standing exercises  - gait training to restore normal mechanics  - fall prevention education  - continued home safety education  - increase AROM greater than  - transfer training    FREQUENCY AND DURATION:  - 1 week 1; 3 week 2    ANY OTHER FOLLOW UP NEEDED: none    DATE OF NEXT APPOINTMENT WITH DOCTOR: 3/12/24 ortho; 3/18/24 outpatient PT      REASSESSMENT DUE DATE: 30 day:  4/28/29    Dr Coy     electronically notified of POC via case communication on  3/1/24

## 2024-03-04 ENCOUNTER — HOME CARE VISIT (OUTPATIENT)
Dept: HOME HEALTH SERVICES | Facility: HOME HEALTHCARE | Age: 54
End: 2024-03-04
Payer: COMMERCIAL

## 2024-03-04 VITALS
HEART RATE: 100 BPM | TEMPERATURE: 97.2 F | OXYGEN SATURATION: 96 % | RESPIRATION RATE: 18 BRPM | SYSTOLIC BLOOD PRESSURE: 138 MMHG | DIASTOLIC BLOOD PRESSURE: 80 MMHG

## 2024-03-04 PROCEDURE — G0159 HHC PT MAINT EA 15 MIN: HCPCS

## 2024-03-04 NOTE — HOME HEALTH
"SUBJECTIVE: \"does my ankle look too swollen to you?\"    No new med changes.  No recent Falls.      ASSESSMENT patient has transitioned self to SPC and feels that she is \"doing better.\"  Dog has returned home and patient reports no new concerns. ROM slowly improving      SKILL/EDUCATION PROVIDED: see interventions for details  PATIENT/CAREGIVER RESPONSE: see interventions for details  _______________________   PLAN FOR NEXT VISIT:   MEDICAL NECESSITY FOR ONGOING SKILLED THERAPY: Skilled physical therapy is medically necessary for treatment of: gait, transfer, ROM, strength and balance deficits following recent hospitalization for: R TKA .Requires instruction in appropriate progression of exercises; education in fall prevention/pain/edema management; gait training to reduce reliance on assistive device; balance retraining to prevent falls. Without skilled physical therapy, patient at risk for: falls, rehospitalization, increased reliance on caregiver, chronic pain,     SPECIFIC INTERVENTIONS AND GOALS TO ADDRESS ON NEXT VISIT:   - progress HEP to include: standing exercises   - gait training to restore normal mechanics   - fall prevention education   - continued home safety education   - increase AROM greater than 24-98 degrees  - transfer training     FREQUENCY AND DURATION:   - 1 week 1; 3 week 2     ANY OTHER FOLLOW UP NEEDED: none     DATE OF NEXT APPOINTMENT WITH DOCTOR: 3/12/24 ortho; 3/18/24 outpatient PT     REASSESSMENT DUE DATE: 30 day: 4/28/29   Dr Coy electronically notified of POC via case communication on 3/1/24"

## 2024-03-05 ENCOUNTER — HOME CARE VISIT (OUTPATIENT)
Dept: HOME HEALTH SERVICES | Facility: HOME HEALTHCARE | Age: 54
End: 2024-03-05
Payer: COMMERCIAL

## 2024-03-05 PROCEDURE — G0151 HHCP-SERV OF PT,EA 15 MIN: HCPCS

## 2024-03-05 NOTE — HOME HEALTH
"SUBJECTIVE: \" patient mildly tearful re: painful knee this visit.  concerned about edema in posterior knee    No new med changes.   No recent Falls.       ASSESSMENT  ankle edema improved since yesterday but increase in edema posterior knee.  appears to be related to increased time spent with LE elevated  to promote postural drainage.  Pulses present.  No redness,  Negative Dennis. ROM slowly improving.  has transitioned back to RW today due to increase in c/o \"knee tightness\" and \"pain\" however gait mechanics continue to improve.  would benefit from continued use of cryotherapy and elevation to promote further postural drainage to reduce edema /pain compliants posterior knee      SKILL/EDUCATION PROVIDED: see interventions for details   PATIENT/CAREGIVER RESPONSE: see interventions for details   _______________________   PLAN FOR NEXT VISIT:   MEDICAL NECESSITY FOR ONGOING SKILLED THERAPY: Skilled physical therapy is medically necessary for treatment of: gait, transfer, ROM, strength and balance defic its following recent hospitalization for: R TKA .Requires instruction in appropriate progression of exercises; education in fall prevention/pain/edema management; gait training to reduce reliance on assistive device; balance retraining to prevent falls. Without skilled physical therapy, patient at risk for: falls, rehospitalization, increased reliance on caregiver, chronic pain,     SPECIFIC INTERVENTIONS AND GOALS TO ADDRESS ON NEXT VI SIT:   - progress HEP    - gait training to restore normal mechanics with SPC; trial of outdoor ambulation- weather permitting   increase AROM greater than 17-98 degrees     FREQUENCY AND DURATION:   - 1 week 1; 3 week 2     ANY OTHER FOLLOW UP NEEDED: none     DATE OF NEXT APPOINTMENT WITH DOCTOR: 3/12/24 ortho; 3/18/24 outpatient PT     REASSESSMENT DUE DATE: 30 day: 4/28/29     Dr Coy electronically notified of POC via case communication on 3/1/24"

## 2024-03-06 ENCOUNTER — TELEPHONE (OUTPATIENT)
Dept: ORTHOPEDIC SURGERY | Facility: HOSPITAL | Age: 54
End: 2024-03-06
Payer: COMMERCIAL

## 2024-03-06 NOTE — TELEPHONE ENCOUNTER
Called and spoke with Ms. Lebron at this time to see how she is doing as she is 1 week SP TKA. She said she is doing ok. Things are getting better as she is now 1 week out. She is working with PT and making progress. Pain is controlled. AMENA dressing in place, she's waiting for the battery pack to quit so she can disconnect it for good and doesn't have to worry about the shower. Things are going well. She's on track with where she was with the other one. Ms. Lebron doesn't have any questions for me at this time. She was given my contact information should she need anything.

## 2024-03-11 ENCOUNTER — HOME CARE VISIT (OUTPATIENT)
Dept: HOME HEALTH SERVICES | Facility: HOME HEALTHCARE | Age: 54
End: 2024-03-11
Payer: COMMERCIAL

## 2024-03-11 VITALS
RESPIRATION RATE: 18 BRPM | HEART RATE: 90 BPM | TEMPERATURE: 97.8 F | SYSTOLIC BLOOD PRESSURE: 154 MMHG | DIASTOLIC BLOOD PRESSURE: 90 MMHG | OXYGEN SATURATION: 99 %

## 2024-03-11 PROCEDURE — G0151 HHCP-SERV OF PT,EA 15 MIN: HCPCS

## 2024-03-11 NOTE — HOME HEALTH
"SUBJECTIVE: \"I did a lot this weekend but my leg is doing well.  I was able to walk next door to my mom's home\"   No new med changes.   No recent Falls.   ASSESSMENT  patient still ambulating with SPC with improved mechanics. pain is well managed  beginning to ambulate short distances without AD. able to teach back safe car transfers on both sides of car.  likely ready for d/c from  next visit with plans for transition to outpatient PT    SKILL/EDUCATION PROVIDED: see interventions for details   PATIENT/CAREGIVER RESPONSE: see interventions for details   _______________________   PLAN FOR NEXT VISIT:   MEDICAL NECESSITY FOR ONGOING SKILLED THERAPY: Skilled physical therapy is medically necessary for treatment of: gait, transfer, ROM, strength and balance deficits following recent hospitalization for: R TKA .Requires instruction in appropriate progression of exercises; education in fall prevention/pain/edema management; gait training to reduce reliance on assistive device; balance retraining to prevent falls. Without skilled physical therapy, patient at risk for: falls, rehospitalization, increased reliance on caregiver, chronic pain,     SPECIFIC INTERVENTIONS AND GOALS TO ADDRESS ON NEXT VI SIT:   - review HEP  - gait training to restore normal mechanics with SPC   - increase AROM greater than 9-113 degrees   - dc assessments    FREQUENCY AND DURATION:   - 1 week 1; 3 week 2     ANY OTHER FOLLOW UP NEEDED: none   DATE OF NEXT APPOINTMENT WITH DOCTOR: 3/12/24 ortho; 3/18/24 outpatient PT   REASSESSMENT DUE DATE: 30 day: 4/28/29"

## 2024-03-12 ENCOUNTER — OFFICE VISIT (OUTPATIENT)
Dept: ORTHOPEDIC SURGERY | Facility: CLINIC | Age: 54
End: 2024-03-12
Payer: COMMERCIAL

## 2024-03-12 ENCOUNTER — HOME CARE VISIT (OUTPATIENT)
Dept: HOME HEALTH SERVICES | Facility: HOME HEALTHCARE | Age: 54
End: 2024-03-12
Payer: COMMERCIAL

## 2024-03-12 VITALS — BODY MASS INDEX: 43.62 KG/M2 | WEIGHT: 246.2 LBS | TEMPERATURE: 96.4 F | HEIGHT: 63 IN

## 2024-03-12 DIAGNOSIS — Z96.651 STATUS POST RIGHT KNEE REPLACEMENT: Primary | ICD-10-CM

## 2024-03-12 PROCEDURE — 99024 POSTOP FOLLOW-UP VISIT: CPT | Performed by: ORTHOPAEDIC SURGERY

## 2024-03-12 PROCEDURE — G0151 HHCP-SERV OF PT,EA 15 MIN: HCPCS

## 2024-03-12 NOTE — HOME HEALTH
"SUBJECTIVE: \"Today's a big day.  I have to go to the doctor with my mom and the doctor for myself.\"  No new med changes.   No recent Falls.   ASSESSMENT overall ROM continues to improve. pain well managed. no longer homebound and ready to transition to outpatient PT next week  SKILL/EDUCATION PROVIDED: see interventions for details   PATIENT/CAREGIVER RESPONSE: see interventions for details      DATE OF NEXT APPOINTMENT WITH DOCTOR: 3/12/24 ortho; 3/18/24 outpatient PT"

## 2024-03-12 NOTE — Clinical Note
Patient discharged from home health PT services on 3/12/24 due to goals met, no longer homebound.  Transitioning to outpatient PT next week.

## 2024-03-13 ENCOUNTER — TELEPHONE (OUTPATIENT)
Dept: INTERNAL MEDICINE | Facility: CLINIC | Age: 54
End: 2024-03-13

## 2024-03-13 NOTE — TELEPHONE ENCOUNTER
Pt would like to know if she can do a video visit for her  hospital follow up . She had to cancel  due to car issues.

## 2024-03-14 ENCOUNTER — TELEMEDICINE (OUTPATIENT)
Dept: INTERNAL MEDICINE | Facility: CLINIC | Age: 54
End: 2024-03-14
Payer: COMMERCIAL

## 2024-03-14 VITALS — BODY MASS INDEX: 43.22 KG/M2 | WEIGHT: 244 LBS

## 2024-03-14 DIAGNOSIS — F41.9 ANXIETY: Primary | ICD-10-CM

## 2024-03-14 RX ORDER — FLUOXETINE HYDROCHLORIDE 40 MG/1
40 CAPSULE ORAL DAILY
Qty: 90 CAPSULE | Refills: 1 | Status: SHIPPED | OUTPATIENT
Start: 2024-03-14

## 2024-03-14 RX ORDER — BUSPIRONE HYDROCHLORIDE 5 MG/1
5 TABLET ORAL 3 TIMES DAILY
Qty: 90 TABLET | Refills: 0 | Status: SHIPPED | OUTPATIENT
Start: 2024-03-14

## 2024-03-14 NOTE — PROGRESS NOTES
"Chief Complaint  No chief complaint on file.    Subjective        Ce Lebron presents to [facility] PRIMARY [facility]  History of Present Illness Mode of Visit: Video  Location of patient: home  Location of provider: [facility] clinic  You have chosen to receive care through a telehealth visit.  Does the patient consent to use a video/audio connection for your medical care today? Yes  The visit included audio and video interaction. No technical issues occurred during this visit.     Wanted to discuss the Pristiq- we switched her from Prozac - didn't help the anxiety. Trintellix caused nausea. The Pristiq is causing diarrhea- daily since starting to take it with no improvement.   She has weaned herself down on the Pristiq the last couple of week, went every other day and now none for 3 days, decreased frequency in stools but a little weepy.   Objective   Vital Signs:  Wt 111 kg (244 lb)   BMI 43.22 kg/m²   Estimated body mass index is 43.22 kg/m² as calculated from the following:    Height as of 3/12/24: 160 cm (63\").    Weight as of this encounter: 111 kg (244 lb).               Physical Exam   Result Review :                     Assessment and Plan     Diagnoses and all orders for this visit:    1. Anxiety (Primary)  Comments:  d/c desvenlafaxine, resume fluoxetine with buspirone for prn use only  Orders:  -     FLUoxetine (PROzac) 40 MG capsule; Take 1 capsule by mouth Daily.  Dispense: 90 capsule; Refill: 1  -     Cancel: PHARMACOGENOMICS PROFILE, ACTX - Swab,; Future  -     busPIRone (BUSPAR) 5 MG tablet; Take 1 tablet by mouth 3 (Three) Times a Day.  Dispense: 90 tablet; Refill: 0  -     PHARMACOGENOMICS PROFILE, ACTX - Swab,; Future             Follow Up     No follow-ups on file.  Patient was given instructions and counseling regarding her condition or for health maintenance advice. Please see specific information pulled into the AVS if appropriate.         "

## 2024-03-18 ENCOUNTER — TREATMENT (OUTPATIENT)
Dept: PHYSICAL THERAPY | Facility: CLINIC | Age: 54
End: 2024-03-18
Payer: COMMERCIAL

## 2024-03-18 DIAGNOSIS — Z96.651 STATUS POST TOTAL RIGHT KNEE REPLACEMENT: ICD-10-CM

## 2024-03-18 DIAGNOSIS — R26.9 GAIT DISTURBANCE: ICD-10-CM

## 2024-03-18 DIAGNOSIS — Z74.09 IMPAIRED FUNCTIONAL MOBILITY AND ACTIVITY TOLERANCE: ICD-10-CM

## 2024-03-18 DIAGNOSIS — M25.561 ACUTE PAIN OF RIGHT KNEE: Primary | ICD-10-CM

## 2024-03-18 PROCEDURE — 97110 THERAPEUTIC EXERCISES: CPT | Performed by: PHYSICAL THERAPIST

## 2024-03-18 PROCEDURE — 97162 PT EVAL MOD COMPLEX 30 MIN: CPT | Performed by: PHYSICAL THERAPIST

## 2024-03-18 NOTE — PROGRESS NOTES
Physical Therapy Initial Evaluation and Plan of Care    [facility]  [address] [place] [address]  [phone] (phone)  [phone] (fax)      Patient: Ce Lebron   : 1970  Diagnosis/ICD-10 Code:  Acute pain of right knee [M25.561]  Referring practitioner: James Coy MD  Date of Initial Visit: 3/18/2024  Today's Date: 3/18/2024  Patient seen for 1 sessions    Visit Diagnoses:    ICD-10-CM ICD-9-CM   1. Acute pain of right knee  M25.561 719.46   2. Status post total right knee replacement  Z96.651 V43.65   3. Gait disturbance  R26.9 781.2   4. Impaired functional mobility and activity tolerance  Z74.09 V49.89              Subjective Evaluation    History of Present Illness  Date of surgery: 2024  Mechanism of injury: -doing well: released from home health PT yesterday  -using [employer] bike  -Hasn't needed pain meds as much   -Has been icing   -Has trouble sleeping but not due to pain   -Patient was able to drive     Subjective comment: R TKA  Patient Occupation: Nurse Quality of life: good    Pain  Current pain ratin  At best pain ratin  At worst pain rating: 3  Location: Anterior/lateral area  Quality: radiating, sharp and discomfort  Relieving factors: ice, relaxation, rest, support, medications and change in position  Aggravating factors: stairs    Social Support  Lives in: multiple-level home (not able to do reciprocal pattern)  Lives with: alone    Treatments  Previous treatment: physical therapy and medication  Current treatment: medication and physical therapy  Discharged from (in last 30 days): home health care  Patient Goals  Patient goals for therapy: decreased edema, decreased pain, improved balance, increased motion, increased strength, independence with ADLs/IADLs, return to sport/leisure activities and return to work  Patient goal: Get back to work, walk up and down stairs, and be able to walk her dog           Objective           Observations     Right Knee   Positive for incision.     Additional Knee Observation Details  Appropriate healing of incision noted- no signs or symptoms of infection    Tenderness     Right Knee   Tenderness in the lateral joint line and medial joint line.     Additional Tenderness Details  -Anterior portion of R knee with increased tenderness   -With SAQ, increased tenderness in medial joint line     Active Range of Motion   Left Knee   Flexion: 120 degrees   Extension: 0 degrees     Right Knee   Flexion: 105 degrees   Extension: 5 degrees     Strength/Myotome Testing     Left Hip   Planes of Motion   Flexion: 4+  Abduction: 4+  Adduction: 4+    Right Hip   Planes of Motion   Flexion: 4+  Abduction: 4  Adduction: 4+    Left Knee   Flexion: 5  Extension: 5  Quadriceps contraction: good    Right Knee   Flexion: 3+  Extension: 3+  Quadriceps contraction: good    Ambulation     Ambulation: Level Surfaces   Ambulation with assistive device: independent    Observational Gait   Gait: antalgic and asymmetric   Increased left stance time. Decreased walking speed, stride length, right stance time, right swing time and right step length.   Left foot contact pattern: heel to toe  Right foot contact pattern: heel to toe  Left arm swing: decreased  Right arm swing: decreased  Base of support: decreased        Exercise   -Quad sets: 1x10  -SAQ: 1x10   -SLR: 1x10   -Seated hamstring curls: 2x10: red and green band   -Step up on 2 inch step: 1x10   -Seated R hamstring stretch: 3x20 sec   -Seated R calf stretch: 3x20 sec       Functional Outcome Score: 53% Knee Outcome Survey Activities of Daily Living Scale        Assessment & Plan       Assessment  Impairments: abnormal gait, abnormal or restricted ROM, activity intolerance, impaired balance, impaired physical strength, lacks appropriate home exercise program, pain with function and weight-bearing intolerance   Functional limitations: sleeping, walking, uncomfortable  because of pain, moving in bed, sitting, standing and unable to perform repetitive tasks   Assessment details: Ce Lebron is a 53 y.o. year-old female referred to physical therapy for s/p R TKA. She presents with a stable clinical presentation.  She has comorbidities of history of osteoarthritis and personal factors of living alone that may affect her progress in the plan of care.  Pt ambulates with single point cane. Pt has decreased R knee ROM in ext=5 deg and flex=105 deg, decreased strength 3+/5, and decreased flexibility of the hamstrings and gastroc/soleus complex.  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    Prognosis: good    Goals  Plan Goals: ST weeks   1. Patient will be independent with education for symptom management, joint protection and strategies to minimize stress on affected tissues  2. Patient will ambulate without the use of a single point cane to demonstrate improved ability to safely ambulate community distances.   3. Pt to improve R knee flexion ROM from 105 to 110 for improved gait pattern during swing phase.     LT weeks   1. Pt to improve R knee extension ROM from 5 to 0 for greater ease with eccentric control of R quad when descending stairs.   2. Pt to improve score on Knee Outcome Survey from 53 % to 75% for overall functional improvement with ambulation, standing for long periods of time, squatting, ascending and descending stairs, and kneeling on the front of knees.   3. Patient will increase knee strength from 3+/5 to 4+/5 to improve functional mobility with STS transfers.   4. Patient will negotiate stairs reciprocally with rail support as needed to demonstrate improved ability to safely navigate the stairs in her home.   5. Patient will demonstrate an independent HEP for core and knee strength and flexibility/ROM to  improve carryover.         Plan  Therapy options: will be seen for skilled therapy services  Planned modality interventions: cryotherapy, ultrasound, TENS, dry needling and thermotherapy (hydrocollator packs)  Planned therapy interventions: ADL retraining, balance/weight-bearing training, flexibility, functional ROM exercises, gait training, home exercise program, IADL retraining, joint mobilization, manual therapy, motor coordination training, neuromuscular re-education, soft tissue mobilization, strengthening, stretching, therapeutic activities, transfer training, body mechanics training and abdominal trunk stabilization  Frequency: 2x week  Duration in weeks: 12  Treatment plan discussed with: patient        Timed:         Manual Therapy:    0     mins  13121;     Therapeutic Exercise:    20     mins  52324;     Neuromuscular Terry:    0    mins  17780;    Therapeutic Activity:     0     mins  43569;     Gait Trainin     mins  65908;     Ultrasound:     0     mins  58791;    Self Care                       0     mins   99410      Un-Timed:  Electrical Stimulation:    0     mins  98797 ( );  Dry Needling  (1-2 muscles)            0     mins 33062 (Self-pay)  Dry Needling (3-4 muscles) 0     mins 33365 (Self-pay)  Dry Needling Trial    0     mins DRYNDLTRIAL  (No Charge)  Traction     0     mins 86766  Low Eval     0     Mins  62388  Mod Eval     25     Mins  01428  High Eval                       0     Mins  51857    Timed Treatment:   20   mins   Total Treatment:     45   mins    PT SIGNATURE: PORTIA Remy License Number: [id number]    Electronically signed by Adry Mckeon, PT Student, 24, 9:04 AM EDT    I have reviewed the notes, assessments, and/or procedures performed by Adry Mckeon, Physical Therapy Student, I concur with her/his documentation of Ce Lebron      DATE TREATMENT INITIATED: 3/18/2024    Initial Certification  Certification Period: 2024  I certify that  the therapy services are furnished while this patient is under my care.  The services outlined above are required by this patient, and will be reviewed every 90 days.     PHYSICIAN: James Coy MD   NPI: [id number]                                         DATE:     Please sign and return via fax to [phone] Thank you, [facility].

## 2024-03-19 NOTE — PROGRESS NOTES
Ce YEBOAH [facility] : 1970 MRN: [id number] DATE: 3/19/2024    DIAGNOSIS: 2 week follow up right total knee      SUBJECTIVE:Patient returns today for 2 week follow up of right total knee replacement. Patient reports doing well with no unusual complaints. Appears to be progressing appropriately.    OBJECTIVE:   Exam:. The incision is healing appropriately. No sign of infection. Range of motion is progressing as expected. The calf is soft and nontender with a negative Homans sign.    ASSESSMENT: 2 week status post right knee replacement.    PLAN: 1) Staples removed and steri strips applied   2) Order given for PT   3) Discontinue LIU hose   4) Continue ice PRN   5) aspirin 81 mg orally every day for 1 month   6) Follow up in 6 weeks with repeat Xrays of right knee (3views)    James Coy MD  3/19/2024

## 2024-03-21 ENCOUNTER — TREATMENT (OUTPATIENT)
Dept: PHYSICAL THERAPY | Facility: CLINIC | Age: 54
End: 2024-03-21
Payer: COMMERCIAL

## 2024-03-21 DIAGNOSIS — Z74.09 IMPAIRED FUNCTIONAL MOBILITY AND ACTIVITY TOLERANCE: ICD-10-CM

## 2024-03-21 DIAGNOSIS — R26.9 GAIT DISTURBANCE: ICD-10-CM

## 2024-03-21 DIAGNOSIS — M25.561 ACUTE PAIN OF RIGHT KNEE: Primary | ICD-10-CM

## 2024-03-21 DIAGNOSIS — Z96.651 STATUS POST TOTAL RIGHT KNEE REPLACEMENT: ICD-10-CM

## 2024-03-21 PROCEDURE — 97530 THERAPEUTIC ACTIVITIES: CPT | Performed by: PHYSICAL THERAPIST

## 2024-03-21 PROCEDURE — 97110 THERAPEUTIC EXERCISES: CPT | Performed by: PHYSICAL THERAPIST

## 2024-03-21 PROCEDURE — 97112 NEUROMUSCULAR REEDUCATION: CPT | Performed by: PHYSICAL THERAPIST

## 2024-03-21 NOTE — PROGRESS NOTES
Physical Therapy Daily Treatment Note    [facility] [place] [address]  [phone] (phone)  [phone] (fax)    Patient: Ce Lebron   : 1970  Diagnosis/ICD-10 Code:  Acute pain of right knee [M25.561]  Referring practitioner: NICK Osborn  Today's Date: 3/21/2024  Patient seen for 2 sessions    Visit Diagnoses:    ICD-10-CM ICD-9-CM   1. Acute pain of right knee  M25.561 719.46   2. Status post total right knee replacement  Z96.651 V43.65   3. Gait disturbance  R26.9 781.2   4. Impaired functional mobility and activity tolerance  Z74.09 V49.89              Subjective Patient states that she is doing well- she is using her single point cane less and has been able to tolerate stretching her R knee on the step. Pain is manageable and she has not had to take pain medication today.     Objective   Exercise/ Therapeutic Activity   -NuStep: level 5 for 8 min   -K knee flexion stretch on step: 3x20 sec R LE   -Forward and lateral step ups: 4 inch step: 1x10 each LE in each direction   -K hip abduction machine: bilateral 45 pounds 2x15    single leg  35 pounds 1x10 each LE   -K leg press: bilateral 65 pounds 2x15    single leg  55 pounds 2x10 each LE   -SLR: 1x10 on R LE- improvement in quad strength and control   -Supine hamstring curls using exercise ball: 1x10 on R LE with bridge   -Supine SAQ: 1x10 on R LE   -Supine quad set: 1x10 on R LE   -Seated hamstring stretch: 3x20 sec on each LE           Assessment/Plan    Patient tolerated progression of strengthening exercises today- no increase in pain or discomfort at end of session noted. Appropriate muscle fatigue. Patient demonstrated improved strength and control of R quad during SLR exercises. Pt would benefit from therapy to help improve R LE strength, ROM, stability, balance, tolerance to activities, and overall functional mobility.        Timed:    Manual Therapy:    0     mins   89548;  Therapeutic Exercise:    25     mins  57677;     Neuromuscular Terry:    8    mins  57666;    Therapeutic Activity:     12     mins  76552;     Gait Trainin     mins  64416;     Ultrasound:     0     mins  91742;    Aquatic Therapy    0     mins 26539;  Self Care                       0     mins   25557        Untimed:  Electrical Stimulation:    0     mins  77253 ( );  Traction:    0     mins  31209;   Dry Needling  (1-2 muscles)            0     mins  (Self-pay)  Dry Needling (3-4 muscles) 0      (Self-pay)  Dry Needling Trial    0     DRYNDLTRIAL  (No Charge)    Timed Treatment:   45   mins   Total Treatment:     45   mins    Debby Long, PT  Physical Therapist    [place] License:[id number]  I have reviewed the notes, assessments, and/or procedures performed by Adry Mckeon, Physical Therapy Student, I concur with her/his documentation of Ce Lebron

## 2024-03-24 DIAGNOSIS — Z96.651 S/P TOTAL KNEE REPLACEMENT, RIGHT: ICD-10-CM

## 2024-03-25 RX ORDER — HYDROCODONE BITARTRATE AND ACETAMINOPHEN 7.5; 325 MG/1; MG/1
1 TABLET ORAL EVERY 6 HOURS PRN
Qty: 40 TABLET | Refills: 0 | Status: SHIPPED | OUTPATIENT
Start: 2024-03-25

## 2024-03-25 RX ORDER — IBUPROFEN 800 MG/1
800 TABLET ORAL EVERY 8 HOURS PRN
Qty: 90 TABLET | Refills: 0 | Status: SHIPPED | OUTPATIENT
Start: 2024-03-25

## 2024-03-27 ENCOUNTER — TREATMENT (OUTPATIENT)
Dept: PHYSICAL THERAPY | Facility: CLINIC | Age: 54
End: 2024-03-27
Payer: COMMERCIAL

## 2024-03-27 DIAGNOSIS — R26.9 GAIT DISTURBANCE: ICD-10-CM

## 2024-03-27 DIAGNOSIS — M25.561 ACUTE PAIN OF RIGHT KNEE: Primary | ICD-10-CM

## 2024-03-27 DIAGNOSIS — Z74.09 IMPAIRED FUNCTIONAL MOBILITY AND ACTIVITY TOLERANCE: ICD-10-CM

## 2024-03-27 DIAGNOSIS — Z96.651 STATUS POST TOTAL RIGHT KNEE REPLACEMENT: ICD-10-CM

## 2024-03-27 PROCEDURE — 97112 NEUROMUSCULAR REEDUCATION: CPT | Performed by: PHYSICAL THERAPIST

## 2024-03-27 PROCEDURE — 97110 THERAPEUTIC EXERCISES: CPT | Performed by: PHYSICAL THERAPIST

## 2024-03-27 PROCEDURE — 97530 THERAPEUTIC ACTIVITIES: CPT | Performed by: PHYSICAL THERAPIST

## 2024-03-27 NOTE — PROGRESS NOTES
Physical Therapy Daily Treatment Note    [facility]  [address] [place] [address]  [phone] (phone)  [phone] (fax)    Patient: Ce Lebron   : 1970  Diagnosis/ICD-10 Code:  Acute pain of right knee [M25.561]  Referring practitioner: NICK Osborn  Today's Date: 3/27/2024  Patient seen for 3 sessions    Visit Diagnoses:    ICD-10-CM ICD-9-CM   1. Acute pain of right knee  M25.561 719.46   2. Status post total right knee replacement  Z96.651 V43.65   3. Gait disturbance  R26.9 781.2   4. Impaired functional mobility and activity tolerance  Z74.09 V49.89              Subjective Patient states that she is feeling well. She has had some nights where sleeping has been challenging. She did not have an increase in pain after last session- mild soreness. She was able to go up a flight of 13 stairs- felt some discomfort on step 8. Patient is experiencing a pop at night in bed that hurts- posterior/medial side on R knee.     Objective     Exercise/ Therapeutic Activity   -NuStep: level 5 for 6 min   -K knee flexion stretch on step: 3x20 sec R LE   -Forward and lateral step ups: 4 inch step: 1x10 each LE in each direction   -K hip abduction machine: bilateral 50 pounds 2x15    single leg  40 pounds 1x10 each LE   -K leg press: bilateral 75 pounds 2x15    single leg  60 pounds 2x10 each LE   -SLR: 1x10 on R LE- improvement in quad strength and control   -Supine hamstring curls using exercise ball: 1x10 on R LE with bridge   -Supine SAQ: 1x10 on R LE   -Supine quad set: 1x10 on R LE         Assessment/Plan    Patient tolerated progression of strengthening and balance exercises today- appropriate muscle fatigue noted. Good contraction of R quad with SLR and better eccentric control on R quad when descending 4 inch step. Will continue to progress as appropriate. Patient will continue to benefit from skilled PT interventions to address R knee strength, ROM,  stability, tolerance to activities, and overall functional mobility.        Timed:    Manual Therapy:    0     mins  13554;  Therapeutic Exercise:    25     mins  44213;     Neuromuscular Terry:    8    mins  21406;    Therapeutic Activity:     12     mins  28198;     Gait Trainin     mins  43961;     Ultrasound:     0     mins  86192;    Aquatic Therapy    0     mins 66489;  Self Care                       0     mins   25758        Untimed:  Electrical Stimulation:    0     mins  76003 ( );  Traction:    0     mins  42425;   Dry Needling  (1-2 muscles)            0     mins  (Self-pay)  Dry Needling (3-4 muscles) 0      (Self-pay)  Dry Needling Trial    0     DRYNDLTRIAL  (No Charge)    Timed Treatment:   45   mins   Total Treatment:     45   mins    Debby Long, PT  Physical Therapist    [place] License:[id number]    I have reviewed the notes, assessments, and/or procedures performed by Adry Mckeon, Physical Therapy Student, I concur with her/his documentation of Ce Lebron

## 2024-03-29 ENCOUNTER — TREATMENT (OUTPATIENT)
Dept: PHYSICAL THERAPY | Facility: CLINIC | Age: 54
End: 2024-03-29
Payer: COMMERCIAL

## 2024-03-29 DIAGNOSIS — R26.9 GAIT DISTURBANCE: ICD-10-CM

## 2024-03-29 DIAGNOSIS — Z74.09 IMPAIRED FUNCTIONAL MOBILITY AND ACTIVITY TOLERANCE: ICD-10-CM

## 2024-03-29 DIAGNOSIS — Z96.651 STATUS POST TOTAL RIGHT KNEE REPLACEMENT: ICD-10-CM

## 2024-03-29 DIAGNOSIS — M25.561 ACUTE PAIN OF RIGHT KNEE: Primary | ICD-10-CM

## 2024-03-29 PROCEDURE — 97530 THERAPEUTIC ACTIVITIES: CPT | Performed by: PHYSICAL THERAPIST

## 2024-03-29 PROCEDURE — 97110 THERAPEUTIC EXERCISES: CPT | Performed by: PHYSICAL THERAPIST

## 2024-03-29 NOTE — PROGRESS NOTES
Physical Therapy Daily Treatment Note    Patient: Ce YEBOAH [facility]   : 1970  Diagnosis/ICD-10 Code:  Acute pain of right knee [M25.561]  Referring practitioner: NICK Osborn  Date of Initial Visit: Type: THERAPY  Noted: 3/18/2024  Today's Date: 3/29/2024  Patient seen for 4 sessions    Visit Diagnoses:    ICD-10-CM ICD-9-CM   1. Acute pain of right knee  M25.561 719.46   2. Status post total right knee replacement  Z96.651 V43.65   3. Gait disturbance  R26.9 781.2   4. Impaired functional mobility and activity tolerance  Z74.09 V49.89       [facility]  [address] [place] [address]  [phone] (phone)  [phone] (fax)         Subjective I was up on my feet  a lot yesterday working on packing up boxes to move. The knee is a little achy and swollen today    Objective          Active Range of Motion     Right Knee   Flexion: 115 degrees   Extension: 5 degrees         Exercise/ Therapeutic Activity   -NuStep: level 5 for 6 min   -K knee flexion stretch on step: 3x20 sec R LE   -Forward and lateral step ups: 4 inch step: 1x10 each LE in each direction   -K hip abduction machine: bilateral 50 pounds 2x15    single leg  40 pounds 1x10 each LE  DEFER  -K leg press: bilateral 75 pounds 2x15    single leg  60 pounds 2x10 each LE   -SLR: 1x10 on R LE- improvement in quad strength and control   -Seated hamstring curls using green band: 2x10 on R LE  -Supine SAQ: 2x10 on R LE   -Supine quad set: 1x10 on R LE    DEFER  -seated hamstring and calf stretch 3x20 sec      Assessment/Plan  Pt is walking around her home and here in therapy without her SC with only min gait deficits. She is improving her ROM, strength,and is able to work around her home more with less discomfort.         Timed:    Manual Therapy:         mins  79885;  Therapeutic Exercise:    35     mins  71228;     Neuromuscular Terry:        mins  04076;    Therapeutic Activity:     10     mins  96803;      Gait Training:           mins  68432;     Ultrasound:          mins  58820;    Electrical Stimulation:         mins  60596 ( );  Iontophoresis         mins 70842;  Aquatic Therapy         mins 92847;      Untimed:  Electrical Stimulation:         mins  29828 ( );  Traction:         mins  91159;   Dry Needling   (1-2 muscles)             mins 20560 (Self-pay)  Dry Needling (3-4 muscles)           mins 20561 (Self-pay)  Dry Needling Trial              mins DRYNDLTRIAL  (No Charge)    Timed Treatment:   45   mins   Total Treatment:     45   mins    Debby Long PT, CDNT  Physical Therapist    [place] License:[id number]

## 2024-04-03 ENCOUNTER — TREATMENT (OUTPATIENT)
Dept: PHYSICAL THERAPY | Facility: CLINIC | Age: 54
End: 2024-04-03
Payer: COMMERCIAL

## 2024-04-03 DIAGNOSIS — M25.561 ACUTE PAIN OF RIGHT KNEE: Primary | ICD-10-CM

## 2024-04-03 DIAGNOSIS — Z74.09 IMPAIRED FUNCTIONAL MOBILITY AND ACTIVITY TOLERANCE: ICD-10-CM

## 2024-04-03 DIAGNOSIS — R26.9 GAIT DISTURBANCE: ICD-10-CM

## 2024-04-03 DIAGNOSIS — Z96.651 STATUS POST TOTAL RIGHT KNEE REPLACEMENT: ICD-10-CM

## 2024-04-03 PROCEDURE — 97530 THERAPEUTIC ACTIVITIES: CPT | Performed by: PHYSICAL THERAPIST

## 2024-04-03 PROCEDURE — 97112 NEUROMUSCULAR REEDUCATION: CPT | Performed by: PHYSICAL THERAPIST

## 2024-04-03 PROCEDURE — 97110 THERAPEUTIC EXERCISES: CPT | Performed by: PHYSICAL THERAPIST

## 2024-04-03 NOTE — PROGRESS NOTES
Physical Therapy Daily Treatment Note    [facility]  [address] [place] [address]  [phone] (phone)  [phone] (fax)    Patient: Ce Lebron   : 1970  Diagnosis/ICD-10 Code:  Acute pain of right knee [M25.561]  Referring practitioner: NICK Osborn  Today's Date: 4/3/2024  Patient seen for 5 sessions    Visit Diagnoses:    ICD-10-CM ICD-9-CM   1. Acute pain of right knee  M25.561 719.46   2. Status post total right knee replacement  Z96.651 V43.65   3. Gait disturbance  R26.9 781.2   4. Impaired functional mobility and activity tolerance  Z74.09 V49.89              Subjective Evaluation    History of Present Illness    Subjective comment: Knee is doing okay, still stiff and lets me know when I overdo.  Continue to elevate but not icing as much anymore       Objective     Exercise/ Therapeutic Activity   -NuStep: level 5 for 6 min   -K knee flexion stretch on step: 3x20 sec R LE   -Forward and lateral step ups: 6 inch step (3rd hold from bottom on K 4-way hip machine): 1x10 ea B LE  -K hip abduction machine: bilateral  55 pounds, 50 pounds x15 ea    single leg 40 pounds 1x10 ea  -K leg press: bilateral 80 pounds 2x15    single leg  65 pounds 2x10 ea LE   -SLR: 2x10 on R LE- challenging to complete 2nd set   -Seated hamstring curls using green band: 2x10 on R LE  -Supine SAQ: 2x10 on R LE   -Supine quad set: 1x10 on R LE   - *deferred  -seated hamstring and calf stretch 3x20 sec        Assessment & Plan       Assessment  Assessment details: Patient reports knee stiffness but doing fairly well as long as she doesn't overdo.  She continues to elevate LE but hasn't been icing as much.  She states she is supposed to return to work in 3 weeks.  Continued with previous ex working on mobility/flexibility, strength/stabilization, and balance/gait.  Added a second set with SLR this visit which was challenging / fatiguing but she was able to complete.   Also progressed resistance on leg press and tried 6 in step ups this date.  She was able to do 6 in step ups with minimal discomfort but was reliant on UE support for balance.  PT provided cuing and demonstration for proper form/technique and actively engaging core / gluts / quads to help increase desired muscle activation and improve exercise quality for maximal benefit.               Timed:    Manual Therapy:    -     mins  28761;  Therapeutic Exercise:    26     mins  69992;     Neuromuscular Terry:    8    mins  79493;    Therapeutic Activity:     10     mins  54608;     Gait Training:      -     mins  01392;     Ultrasound:     -     mins  72004;    Aquatic Therapy    -     mins 34585;  Self Care                       -     mins   62539        Untimed:  Electrical Stimulation:    -     mins  70438 ( );  Traction:    -     mins  43669;   Dry Needling  (1-2 muscles)            -     mins 20560 (Self-pay)  Dry Needling (3-4 muscles) -     20561 (Self-pay)  Dry Needling Trial    -     DRYNDLTRIAL  (No Charge)    Timed Treatment:   44   mins   Total Treatment:     44   mins    Kylee Tavarez PT  Physical Therapist    [place] License:  [id number]

## 2024-04-05 ENCOUNTER — TREATMENT (OUTPATIENT)
Dept: PHYSICAL THERAPY | Facility: CLINIC | Age: 54
End: 2024-04-05
Payer: COMMERCIAL

## 2024-04-05 DIAGNOSIS — M25.561 ACUTE PAIN OF RIGHT KNEE: Primary | ICD-10-CM

## 2024-04-05 DIAGNOSIS — R26.9 GAIT DISTURBANCE: ICD-10-CM

## 2024-04-05 DIAGNOSIS — Z96.651 STATUS POST TOTAL RIGHT KNEE REPLACEMENT: ICD-10-CM

## 2024-04-05 DIAGNOSIS — Z74.09 IMPAIRED FUNCTIONAL MOBILITY AND ACTIVITY TOLERANCE: ICD-10-CM

## 2024-04-05 PROCEDURE — 97530 THERAPEUTIC ACTIVITIES: CPT | Performed by: PHYSICAL THERAPIST

## 2024-04-05 PROCEDURE — 97110 THERAPEUTIC EXERCISES: CPT | Performed by: PHYSICAL THERAPIST

## 2024-04-05 PROCEDURE — 97112 NEUROMUSCULAR REEDUCATION: CPT | Performed by: PHYSICAL THERAPIST

## 2024-04-05 NOTE — PROGRESS NOTES
Physical Therapy Daily Treatment Note    Patient: Ce YEBOAH [facility]   : 1970  Diagnosis/ICD-10 Code:  Acute pain of right knee [M25.561]  Referring practitioner: NICK Osborn  Date of Initial Visit: Type: THERAPY  Noted: 3/18/2024  Today's Date: 2024  Patient seen for 6 sessions    Visit Diagnoses:    ICD-10-CM ICD-9-CM   1. Acute pain of right knee  M25.561 719.46   2. Status post total right knee replacement  Z96.651 V43.65   3. Gait disturbance  R26.9 781.2   4. Impaired functional mobility and activity tolerance  Z74.09 V49.89       [facility]  [address] [place] [address]  [phone] (phone)  [phone] (fax)         Subjective  overall doing better I have been walking more without an AD    Objective          Active Range of Motion     Right Knee   Flexion: 122 (supine) degrees   Extension: 6 degrees         Exercise/ Therapeutic Activity/NM re-ed:   -Star Trak recumbent bike, seat 3, level 9, x5 min doing full revolutions  -K knee flexion stretch on step: 3x20 sec R LE   -Fwd step ups on 8 in step, (4th hole from bottom of K 4-way hip) x20 each  - lateral step ups: 6 inch step (3rd hold from bottom on K 4-way hip machine): 1x10 ea B LE  -4 inch step up and over, x10 working on eccentric lowering of with the L LE  -K hip abduction machine: bilateral 50 pounds 2x15     single leg 40 pounds 1x10 ea  -K leg press: bilateral 80 pounds 2x15    single leg  65 pounds 2x10 ea LE   -SLR: 2x10 on R LE- challenging to complete 2nd set  DEFER  -Seated hamstring curls using green band: 2x10 on R LE  -Supine SAQ: 2x10 on R LE   -seated hamstring and calf stretch 3x20 sec (manually stretched today in supine)  -cues for form with stretching, slow and controlled with exercises    Assessment/Plan  Pt is no longer using her cane, although she will take it next weeks when she flies to [place]. She has improved her gait pattern with very little antalgia. Her ROM in  ctx pain flexion has improved from 115 to 122 deg over the last week. Continue to work on her extension, gait, and strength to help improve function and RTW         Timed:    Manual Therapy:         mins  00158;  Therapeutic Exercise:    25     mins  06723;     Neuromuscular Terry:    8    mins  20374;    Therapeutic Activity:     12     mins  59353;     Gait Training:           mins  52333;     Ultrasound:          mins  97721;    Electrical Stimulation:         mins  72663 ( );  Iontophoresis         mins 07377;  Aquatic Therapy         mins 54836;      Untimed:  Electrical Stimulation:         mins  24435 ( );  Traction:         mins  26090;   Dry Needling   (1-2 muscles)             mins 20560 (Self-pay)  Dry Needling (3-4 muscles)           mins 20561 (Self-pay)  Dry Needling Trial              mins DRYNDLTRIAL  (No Charge)    Timed Treatment:   45   mins   Total Treatment:     45   mins    Debby Long PT, CDNT  Physical Therapist    [place] License:[id number]

## 2024-04-08 ENCOUNTER — TELEPHONE (OUTPATIENT)
Dept: PHYSICAL THERAPY | Facility: CLINIC | Age: 54
End: 2024-04-08

## 2024-04-18 RX ORDER — PANTOPRAZOLE SODIUM 40 MG/1
40 TABLET, DELAYED RELEASE ORAL DAILY PRN
Qty: 90 TABLET | Refills: 1 | Status: SHIPPED | OUTPATIENT
Start: 2024-04-18

## 2024-04-18 RX ORDER — MELOXICAM 15 MG/1
15 TABLET ORAL DAILY
Qty: 90 TABLET | Refills: 0 | Status: SHIPPED | OUTPATIENT
Start: 2024-04-18

## 2024-04-18 RX ORDER — SPIRONOLACTONE AND HYDROCHLOROTHIAZIDE 25; 25 MG/1; MG/1
1 TABLET ORAL DAILY
Qty: 90 TABLET | Refills: 1 | Status: SHIPPED | OUTPATIENT
Start: 2024-04-18

## 2024-04-19 ENCOUNTER — TREATMENT (OUTPATIENT)
Dept: PHYSICAL THERAPY | Facility: CLINIC | Age: 54
End: 2024-04-19
Payer: COMMERCIAL

## 2024-04-19 DIAGNOSIS — Z96.651 STATUS POST TOTAL RIGHT KNEE REPLACEMENT: ICD-10-CM

## 2024-04-19 DIAGNOSIS — M25.561 ACUTE PAIN OF RIGHT KNEE: Primary | ICD-10-CM

## 2024-04-19 DIAGNOSIS — R26.9 GAIT DISTURBANCE: ICD-10-CM

## 2024-04-19 DIAGNOSIS — Z74.09 IMPAIRED FUNCTIONAL MOBILITY AND ACTIVITY TOLERANCE: ICD-10-CM

## 2024-04-19 PROCEDURE — 97110 THERAPEUTIC EXERCISES: CPT | Performed by: PHYSICAL THERAPIST

## 2024-04-19 PROCEDURE — 97112 NEUROMUSCULAR REEDUCATION: CPT | Performed by: PHYSICAL THERAPIST

## 2024-04-19 PROCEDURE — 97530 THERAPEUTIC ACTIVITIES: CPT | Performed by: PHYSICAL THERAPIST

## 2024-04-19 NOTE — PROGRESS NOTES
30-Day / 10-Visit Progress Note         Patient: Ce Lebron   : 1970  Diagnosis/ICD-10 Code:  Acute pain of right knee [M25.561]  Referring practitioner: NICK Osborn  Date of Initial Visit: Type: THERAPY  Noted: 3/18/2024  Today's Date: 2024  Patient seen for 7 sessions    Visit Diagnoses:    ICD-10-CM ICD-9-CM   1. Acute pain of right knee  M25.561 719.46   2. Status post total right knee replacement  Z96.651 V43.65   3. Gait disturbance  R26.9 781.2   4. Impaired functional mobility and activity tolerance  Z74.09 V49.89       [facility]  [address] [place] [address]  [phone] (phone)  [phone] (fax)    Subjective:     Clinical Progress: improved  Home Program Compliance: Yes  Treatment has included:  therapeutic exercise, manual therapy, therapeutic activity, neuro-muscular retraining , gait training, and patient education with home exercise program     Subjective   Objective          Active Range of Motion     Right Knee   Flexion: 123 degrees   Extension: 4 degrees         Exercise/ Therapeutic Activity/NM re-ed:   -Star Trak recumbent bike, seat 3, level 9, x5 min doing full revolutions (did the NuStep today)  -K knee flexion stretch on step: 3x20 sec R LE   -Fwd step ups on 8 in step, (4th hole from bottom of K 4-way hip) x20 each  - lateral step ups: 6 inch step (3rd hold from bottom on K 4-way hip machine): 1x10 ea B LE  -6 inch step up and over, x10 working on eccentric lowering of with the L LE  -K hip abduction machine: bilateral 50 pounds 2x15     single leg 40 pounds 1x10 ea  -K leg press: bilateral 110 pounds 2x15    single leg  110 pounds 2x10 ea LE   -SLR: 2x10 on R LE- challenging to complete 2nd set  DEFER  -Seated hamstring curls using green band: 2x10 on R LE  -Supine SAQ: 2x10 on R LE   -seated hamstring and calf stretch 3x20 sec (manually stretched today in supine)  -cues for form with stretching, slow and  controlled with exercises    Functional Outcome Score:   Knee Outcome Survey =82.5%    Assessment & Plan       Assessment  Assessment details: Ce Lebron has been seen for 7 physical therapy sessions for s/p R TKA.  Treatment has included therapeutic exercise, manual therapy, therapeutic activity, neuro-muscular retraining , gait training, and patient education with home exercise program . Progress to physical therapy goals is good. Pt has made great progress in her ROM, strength and tolerance to functional activities. She is able perform stairs with greater ease and is walking with a normal gait pattern.  She will benefit from continued skilled physical therapy to address remaining impairments and functional limitations.     Prognosis: good    Goals  Plan Goals: ST weeks   1. Patient will be independent with education for symptom management, joint protection and strategies to minimize stress on affected tissues (MET)   2. Patient will ambulate without the use of a single point cane to demonstrate improved ability to safely ambulate community distances. (MET)   3. Pt to improve R knee flexion ROM from 105 to 110 for improved gait pattern during swing phase. (MET)      LT weeks   1. Pt to improve R knee extension ROM from 5 to 0 for greater ease with eccentric control of R quad when descending stairs. (ONGOING)   2. Pt to improve score on Knee Outcome Survey from 53 % to 75% for overall functional improvement with ambulation, standing for long periods of time, squatting, ascending and descending stairs, and kneeling on the front of knees. (MET)   3. Patient will increase knee strength from 3+/5 to 4+/5 to improve functional mobility with STS transfers. (PART MET)   4. Patient will negotiate stairs reciprocally with rail support as needed to demonstrate improved ability to safely navigate the stairs in her home. (MET)   5. Patient will demonstrate an independent HEP for core and knee strength and  flexibility/ROM to improve carryover. (PART MET)       Plan  Therapy options: will be seen for skilled therapy services  Frequency: 2x week  Duration in weeks: 4  Treatment plan discussed with: patient  Plan details: Pt is going back to work on 4/29. Probable DC next week as she is doing very well.            Recommendations: Continue as planned  Timeframe: 1 month  Prognosis to achieve goals: good    PT Signature: Debby Long PT, CDNT    License Number: [id number]    Electronically signed by Debby Long PT, 04/19/24, 2:30 PM EDT      Based upon review of the patient's progress and continued therapy plan, it is my medical opinion that Ce Lebron should continue physical therapy treatment at [facility] PHYSICAL THERAPY  [address] [place] [address] [address] [place] [address]  [phone].    Signature: __________________________________  Xiang Fitzgerald APRN    Timed:  Manual Therapy:         mins  86303;  Therapeutic Exercise:    25     mins  07158;     Neuromuscular Terry:    8    mins  93559;    Therapeutic Activity:     12     mins  84645;     Gait Training:           mins  56306;     Ultrasound:          mins  23387;    Iontophoresis         mins 77175;    Untimed:  Electrical Stimulation:         mins  45841 ( );  Traction:         mins  79315;   Dry Needling   (1-2 muscles)            mins 25152 (Self-pay)  Dry Needling (3-4 muscles)             mins 20561 (Self-pay)  Dry Needling Trial                 mins DRYNDLTRIAL  (No Charge)    Timed Treatment:   45   mins   Total Treatment:     45   mins

## 2024-04-22 ENCOUNTER — TREATMENT (OUTPATIENT)
Dept: PHYSICAL THERAPY | Facility: CLINIC | Age: 54
End: 2024-04-22
Payer: COMMERCIAL

## 2024-04-22 DIAGNOSIS — Z96.651 STATUS POST TOTAL RIGHT KNEE REPLACEMENT: ICD-10-CM

## 2024-04-22 DIAGNOSIS — Z74.09 IMPAIRED FUNCTIONAL MOBILITY AND ACTIVITY TOLERANCE: ICD-10-CM

## 2024-04-22 DIAGNOSIS — R26.9 GAIT DISTURBANCE: ICD-10-CM

## 2024-04-22 DIAGNOSIS — M25.561 ACUTE PAIN OF RIGHT KNEE: Primary | ICD-10-CM

## 2024-04-22 PROCEDURE — 97530 THERAPEUTIC ACTIVITIES: CPT | Performed by: PHYSICAL THERAPIST

## 2024-04-22 PROCEDURE — 97112 NEUROMUSCULAR REEDUCATION: CPT | Performed by: PHYSICAL THERAPIST

## 2024-04-22 PROCEDURE — 97110 THERAPEUTIC EXERCISES: CPT | Performed by: PHYSICAL THERAPIST

## 2024-04-22 NOTE — PROGRESS NOTES
Physical Therapy Daily Treatment Note    Patient: Ce YEBOAH [facility]   : 1970  Diagnosis/ICD-10 Code:  Acute pain of right knee [M25.561]  Referring practitioner: NICK Osborn  Date of Initial Visit: Type: THERAPY  Noted: 3/18/2024  Today's Date: 2024  Patient seen for 8 sessions    Visit Diagnoses:    ICD-10-CM ICD-9-CM   1. Acute pain of right knee  M25.561 719.46   2. Status post total right knee replacement  Z96.651 V43.65   3. Gait disturbance  R26.9 781.2   4. Impaired functional mobility and activity tolerance  Z74.09 V49.89       [facility]  [address] [place] [address]  [phone] (phone)  [phone] (fax)         Subjective knee feels good. I have to go back to [place] this week to see a house, should be perfect for us    Objective     Exercise/ Therapeutic Activity/NM re-ed:   -Star Trak recumbent bike, seat 3, level 9, x5 min doing full revolutions (did the NuStep today)  -K knee flexion stretch on step: 3x20 sec R LE   -Fwd step ups on 8 in step, (4th hole from bottom of K 4-way hip) x20 each  - lateral step ups: 6 inch step (3rd hold from bottom on K 4-way hip machine): 1x10 ea B LE  -6 inch step up and over, x10 working on eccentric lowering of with the L LE  -K hip abduction machine: bilateral 50 pounds 2x15     single leg 40 pounds 1x10 ea  -K leg press: bilateral 110 pounds 2x15    single leg  110 pounds 2x10 ea LE   -SLR: 2x10 on R LE- challenging to complete 2nd set  DEFER  -Seated hamstring curls using green band: 2x10 on R LE  -Supine SAQ: 2x10 on R LE   -seated hamstring and calf stretch 3x20 sec (manually stretched today in supine)  -cues for form with stretching, slow and controlled with exercises       Assessment/Plan  Pt is progressing well. She is reciprocal on stairs, but holding on to railing just for some balance, especially with descending. She is still lacking some ext, but not affecting her gait pattern. Plan to see for  3-4 visits then DC to HEP as she is RTW         Timed:    Manual Therapy:         mins  17292;  Therapeutic Exercise:    25     mins  62808;     Neuromuscular Terry:    10    mins  68581;    Therapeutic Activity:     10     mins  49053;     Gait Training:           mins  43321;     Ultrasound:          mins  39182;    Electrical Stimulation:         mins  72235 ( );  Iontophoresis         mins 34877;  Aquatic Therapy         mins 43083;      Untimed:  Electrical Stimulation:         mins  50616 ( );  Traction:         mins  32461;   Dry Needling   (1-2 muscles)             mins 20560 (Self-pay)  Dry Needling (3-4 muscles)           mins 20561 (Self-pay)  Dry Needling Trial              mins DRYNDLTRIAL  (No Charge)    Timed Treatment:   45   mins   Total Treatment:     45   mins    Debby Long PT, CDNT  Physical Therapist    [place] License:[id number]

## 2024-04-23 ENCOUNTER — OFFICE VISIT (OUTPATIENT)
Dept: ORTHOPEDIC SURGERY | Facility: CLINIC | Age: 54
End: 2024-04-23
Payer: COMMERCIAL

## 2024-04-23 VITALS — BODY MASS INDEX: 43.91 KG/M2 | WEIGHT: 247.8 LBS | HEIGHT: 63 IN | TEMPERATURE: 97.5 F

## 2024-04-23 DIAGNOSIS — Z96.651 S/P TOTAL KNEE REPLACEMENT, RIGHT: ICD-10-CM

## 2024-04-23 DIAGNOSIS — R52 PAIN: Primary | ICD-10-CM

## 2024-04-23 PROCEDURE — 99024 POSTOP FOLLOW-UP VISIT: CPT | Performed by: NURSE PRACTITIONER

## 2024-04-23 PROCEDURE — 73562 X-RAY EXAM OF KNEE 3: CPT | Performed by: NURSE PRACTITIONER

## 2024-04-23 NOTE — PROGRESS NOTES
Ce YEBOAH [facility] : 1970 MRN: [id number] DATE: 2024    DIAGNOSIS: 8 week follow up right total knee      SUBJECTIVE:Patient returns today for 8 week follow up of right total knee replacement. Patient reports doing well with no unusual complaints. Appears to be progressing appropriately.  Patient reports that her pain level is very minimal.  States that she is still going to outpatient physical therapy at [facility] and making good progress.  States that her hamstrings are still little tight she has not required full extension.  She denies any limitations or any instability issues.  Denies any signs or symptoms of infection, and she is without any other significant complaints today.    OBJECTIVE:   Exam:. The incision is well healed. No sign of infection. Range of motion is measured at 4 to 123. The calf is soft and nontender with a negative Homans sign. Strength is progressing and the patient is ambulating appropriately.    DIAGNOSTIC STUDIES  Xrays: 3 views of the right knee (AP, lateral, and sunrise) were ordered and reviewed for evaluation of recent knee replacement. They demonstrate a well positioned, well aligned knee replacement without complicating factors noted. In comparison with previous films there has been no change.    ASSESSMENT: 8 week status post right knee replacement.    PLAN: 1) Continue with PT exercises as prescribed   2) Follow up in 10 months for annual visit    NICK Osborn  2024

## 2024-04-23 NOTE — LETTER
April 23, 2024     Patient: Ce Lebron   YOB: 1970   Date of Visit: 4/23/2024       To Whom It May Concern:    It is my medical opinion that Ce Lebron may return to work on Thursday May 2nd full duty without restrictions .           Sincerely,        NICK Osborn

## 2024-04-24 RX ORDER — ALBUTEROL SULFATE 90 UG/1
1 AEROSOL, METERED RESPIRATORY (INHALATION) EVERY 4 HOURS PRN
Qty: 8.5 G | Refills: 3 | OUTPATIENT
Start: 2024-04-24

## 2024-04-24 RX ORDER — CYCLOBENZAPRINE HCL 10 MG
10 TABLET ORAL 3 TIMES DAILY PRN
Qty: 90 TABLET | Refills: 0 | OUTPATIENT
Start: 2024-04-24

## 2024-05-10 RX ORDER — CYCLOBENZAPRINE HCL 10 MG
10 TABLET ORAL 3 TIMES DAILY PRN
Qty: 90 TABLET | Refills: 0 | OUTPATIENT
Start: 2024-05-10

## 2024-05-13 DIAGNOSIS — F41.9 ANXIETY: ICD-10-CM

## 2024-05-13 RX ORDER — BUSPIRONE HYDROCHLORIDE 5 MG/1
5 TABLET ORAL 3 TIMES DAILY
Qty: 90 TABLET | Refills: 0 | Status: SHIPPED | OUTPATIENT
Start: 2024-05-13

## 2024-05-13 RX ORDER — ALBUTEROL SULFATE 90 UG/1
1 AEROSOL, METERED RESPIRATORY (INHALATION) EVERY 4 HOURS PRN
Qty: 8.5 G | Refills: 3 | OUTPATIENT
Start: 2024-05-13

## 2024-05-17 RX ORDER — CYCLOBENZAPRINE HCL 10 MG
10 TABLET ORAL 3 TIMES DAILY PRN
Qty: 90 TABLET | Refills: 0 | Status: SHIPPED | OUTPATIENT
Start: 2024-05-17

## 2024-05-29 RX ORDER — MELOXICAM 15 MG/1
15 TABLET ORAL DAILY
Qty: 90 TABLET | Refills: 0 | Status: SHIPPED | OUTPATIENT
Start: 2024-05-29

## 2024-07-29 NOTE — TELEPHONE ENCOUNTER
07/29/24 1218   Discharge Planning   Living Arrangements Alone   Support Systems Family members   Type of Residence Nursing home/residential care   Home or Post Acute Services Post acute facilities (Rehab/SNF/etc)   Type of Post Acute Facility Services Long term care   Expected Discharge Disposition Other  (Intermediate Care Facility)   Does the patient need discharge transport arranged? Yes   RoundTrip coordination needed? Yes   Has discharge transport been arranged? No   Financial Resource Strain   How hard is it for you to pay for the very basics like food, housing, medical care, and heating? Not hard   Housing Stability   In the last 12 months, was there a time when you were not able to pay the mortgage or rent on time? N   At any time in the past 12 months, were you homeless or living in a shelter (including now)? N   Transportation Needs   In the past 12 months, has lack of transportation kept you from medical appointments or from getting medications? no   In the past 12 months, has lack of transportation kept you from meetings, work, or from getting things needed for daily living? No   Patient Choice   Provider Choice list and CMS website (https://medicare.gov/care-compare#search) for post-acute Quality and Resource Measure Data were provided and reviewed with: Patient   Patient / Family choosing to utilize agency / facility established prior to hospitalization Yes     PCP is Koko Valentin MD PhD. Patient is from [facility]. Patient is agreeable to return, Will request [facility] send return referral.     [id number] can accept patient. No auth Needed   Called and spoke with Ms. Lebron to see how she is doing as she is 1 week SP TKA. She said she is doing pretty good. Every day gets a little bit better. She is doing her exercises and working with PT. She was able to walk outside today and is pleased with her progress. Pain is controlled she is starting to wean down taking them every 6 hours. Dressing looks good no issues. Green light blinking no questions on what to do when the battery dies. BM's are getting better. Ms. Lebron doesn't have any questions for me at this time. She was given my contact information should she need anything.

## 2024-08-26 ENCOUNTER — DOCUMENTATION (OUTPATIENT)
Dept: PHYSICAL THERAPY | Facility: CLINIC | Age: 54
End: 2024-08-26
Payer: COMMERCIAL

## 2024-08-26 DIAGNOSIS — R26.9 GAIT DISTURBANCE: ICD-10-CM

## 2024-08-26 DIAGNOSIS — M25.561 ACUTE PAIN OF RIGHT KNEE: Primary | ICD-10-CM

## 2024-08-26 DIAGNOSIS — Z96.651 S/P TOTAL KNEE ARTHROPLASTY, RIGHT: ICD-10-CM

## 2024-08-26 NOTE — PROGRESS NOTES
Closure of Physical Therapy Encounter     Ce Lebron was seen for 8 physical therapy sessions for R TKA.  Treatment included therapeutic exercise, therapeutic activity, neuro-muscular retraining , gait training, and patient education with home exercise program . Progress to physical therapy goals was good.  She was discharged to an independent [facility] and provided patient education to self-manage condition.       Debby Long, PT  Physical Therapist

## 2024-09-23 ENCOUNTER — APPOINTMENT (RX ONLY)
Dept: URBAN - METROPOLITAN AREA CLINIC 130 | Facility: CLINIC | Age: 54
Setting detail: DERMATOLOGY
End: 2024-09-23

## 2024-09-23 DIAGNOSIS — D18.0 HEMANGIOMA: ICD-10-CM

## 2024-09-23 DIAGNOSIS — L20.89 OTHER ATOPIC DERMATITIS: ICD-10-CM

## 2024-09-23 DIAGNOSIS — L57.8 OTHER SKIN CHANGES DUE TO CHRONIC EXPOSURE TO NONIONIZING RADIATION: ICD-10-CM

## 2024-09-23 DIAGNOSIS — D49.2 NEOPLASM OF UNSPECIFIED BEHAVIOR OF BONE, SOFT TISSUE, AND SKIN: ICD-10-CM

## 2024-09-23 DIAGNOSIS — L82.1 OTHER SEBORRHEIC KERATOSIS: ICD-10-CM

## 2024-09-23 DIAGNOSIS — L85.3 XEROSIS CUTIS: ICD-10-CM

## 2024-09-23 DIAGNOSIS — L91.8 OTHER HYPERTROPHIC DISORDERS OF THE SKIN: ICD-10-CM

## 2024-09-23 DIAGNOSIS — D22 MELANOCYTIC NEVI: ICD-10-CM

## 2024-09-23 PROBLEM — D22.5 MELANOCYTIC NEVI OF TRUNK: Status: ACTIVE | Noted: 2024-09-23

## 2024-09-23 PROBLEM — D18.01 HEMANGIOMA OF SKIN AND SUBCUTANEOUS TISSUE: Status: ACTIVE | Noted: 2024-09-23

## 2024-09-23 PROCEDURE — 99204 OFFICE O/P NEW MOD 45 MIN: CPT | Mod: 25

## 2024-09-23 PROCEDURE — 11306 SHAVE SKIN LESION 0.6-1.0 CM: CPT

## 2024-09-23 PROCEDURE — 11301 SHAVE SKIN LESION 0.6-1.0 CM: CPT

## 2024-09-23 PROCEDURE — ? DEFER

## 2024-09-23 PROCEDURE — ? PRESCRIPTION MEDICATION MANAGEMENT

## 2024-09-23 PROCEDURE — ? PRESCRIPTION

## 2024-09-23 PROCEDURE — ? SHAVE REMOVAL

## 2024-09-23 PROCEDURE — ? COUNSELING

## 2024-09-23 RX ORDER — CEPHALEXIN 500 MG/1
1 CAPSULE ORAL
Qty: 14 | Refills: 0 | Status: ERX | COMMUNITY
Start: 2024-09-23

## 2024-09-23 RX ORDER — HYDROCORTISONE 25 MG/G
THIN LAYER CREAM TOPICAL BID
Qty: 30 | Refills: 3 | Status: ERX | COMMUNITY
Start: 2024-09-23

## 2024-09-23 RX ORDER — TRIAMCINOLONE ACETONIDE 1 MG/G
THIN LAYER CREAM TOPICAL BID
Qty: 454 | Refills: 2 | Status: ERX | COMMUNITY
Start: 2024-09-23

## 2024-09-23 RX ADMIN — HYDROCORTISONE THIN LAYER: 25 CREAM TOPICAL at 00:00

## 2024-09-23 RX ADMIN — CEPHALEXIN 1: 500 CAPSULE ORAL at 00:00

## 2024-09-23 RX ADMIN — TRIAMCINOLONE ACETONIDE THIN LAYER: 1 CREAM TOPICAL at 00:00

## 2024-09-23 ASSESSMENT — LOCATION DETAILED DESCRIPTION DERM
LOCATION DETAILED: LEFT INFERIOR ANTERIOR NECK
LOCATION DETAILED: RIGHT INSTEP
LOCATION DETAILED: LEFT INFERIOR UPPER BACK
LOCATION DETAILED: PERIUMBILICAL SKIN
LOCATION DETAILED: LEFT INFERIOR LATERAL NECK
LOCATION DETAILED: EPIGASTRIC SKIN
LOCATION DETAILED: UPPER STERNUM

## 2024-09-23 ASSESSMENT — LOCATION SIMPLE DESCRIPTION DERM
LOCATION SIMPLE: ABDOMEN
LOCATION SIMPLE: LEFT UPPER BACK
LOCATION SIMPLE: LEFT ANTERIOR NECK
LOCATION SIMPLE: CHEST
LOCATION SIMPLE: RIGHT PLANTAR SURFACE

## 2024-09-23 ASSESSMENT — LOCATION ZONE DERM
LOCATION ZONE: FEET
LOCATION ZONE: NECK
LOCATION ZONE: TRUNK

## 2024-09-23 ASSESSMENT — BSA RASH: BSA RASH: 5

## 2024-09-23 NOTE — PROCEDURE: PRESCRIPTION MEDICATION MANAGEMENT
Initiate Treatment: Xyzal qd\\nTAC 0.1% bid 10/5 to body\\nHydrocortisone bid 10/5 to face\\nAveeno soaks \\n[place] ro[place] Posay cicaplast
Detail Level: Detailed
Plan: Follow up in 6 weeks
Render In Strict Bullet Format?: No

## 2024-09-23 NOTE — PROCEDURE: SHAVE REMOVAL
Medical Necessity Information: It is in your best interest to select a reason for this procedure from the list below. All of these items fulfill various CMS LCD requirements except the new and changing color options.
Medical Necessity Clause: This procedure was medically necessary because the lesion that was treated was:
Lab: -[id number]
Lab Facility: [id number]
Detail Level: Detailed
Was A Bandage Applied: Yes
Size Of Lesion In Cm (Required): 0.7
X Size Of Lesion In Cm (Optional): 0
Depth Of Shave: dermis
Biopsy Method: Dermablade
Anesthesia Type: 2% lidocaine with epinephrine and a 1:10 solution of 8.4% sodium bicarbonate
Anesthesia Volume In Cc: 1
Hemostasis: Drysol
Wound Care: Mupirocin
Render Path Notes In Note?: No
Consent was obtained from the patient. The risks and benefits to therapy were discussed in detail. Specifically, the risks of infection, scarring, bleeding, prolonged wound healing, incomplete removal, allergy to anesthesia, nerve injury and recurrence were addressed. Prior to the procedure, the treatment site was clearly identified and confirmed by the patient. All components of Universal Protocol/PAUSE Rule completed.
Post-Care Instructions: I reviewed with the patient in detail post-care instructions. Patient is to keep the biopsy site dry overnight, and then apply bacitracin twice daily until healed. Patient may apply hydrogen peroxide soaks to remove any crusting.
Notification Instructions: Patient will be notified of pathology results. However, patient instructed to call the office if not contacted within 2 weeks.
Billing Type: Third-Party Bill
Size Of Lesion In Cm (Required): 0.6

## 2024-09-23 NOTE — PROCEDURE: DEFER
Introduction Text (Please End With A Colon): The following was deferred:
Size Of Lesion In Cm (Optional): 0
Detail Level: Detailed

## 2025-03-03 ENCOUNTER — APPOINTMENT (OUTPATIENT)
Dept: URBAN - METROPOLITAN AREA CLINIC 130 | Facility: CLINIC | Age: 55
Setting detail: DERMATOLOGY
End: 2025-03-03

## 2025-03-03 DIAGNOSIS — L20.89 OTHER ATOPIC DERMATITIS: ICD-10-CM | Status: WELL CONTROLLED

## 2025-03-03 DIAGNOSIS — D22 MELANOCYTIC NEVI: ICD-10-CM

## 2025-03-03 PROBLEM — D22.5 MELANOCYTIC NEVI OF TRUNK: Status: ACTIVE | Noted: 2025-03-03

## 2025-03-03 PROBLEM — D22.71 MELANOCYTIC NEVI OF RIGHT LOWER LIMB, INCLUDING HIP: Status: ACTIVE | Noted: 2025-03-03

## 2025-03-03 PROCEDURE — 11305 SHAVE SKIN LESION 0.5 CM/<: CPT

## 2025-03-03 PROCEDURE — ? SHAVE REMOVAL

## 2025-03-03 PROCEDURE — 11300 SHAVE SKIN LESION 0.5 CM/<: CPT

## 2025-03-03 PROCEDURE — 99213 OFFICE O/P EST LOW 20 MIN: CPT | Mod: 25

## 2025-03-03 PROCEDURE — ? PRESCRIPTION MEDICATION MANAGEMENT

## 2025-03-03 PROCEDURE — ? PRESCRIPTION

## 2025-03-03 PROCEDURE — ? COUNSELING

## 2025-03-03 RX ORDER — MUPIROCIN 20 MG/G
OINTMENT TOPICAL BID
Qty: 22 | Refills: 3 | Status: ERX | COMMUNITY
Start: 2025-03-03

## 2025-03-03 RX ORDER — CEPHALEXIN 250 MG/1
CAPSULE ORAL BID
Qty: 7 | Refills: 0 | Status: ERX | COMMUNITY
Start: 2025-03-03

## 2025-03-03 RX ADMIN — CEPHALEXIN: 250 CAPSULE ORAL at 00:00

## 2025-03-03 RX ADMIN — MUPIROCIN: 20 OINTMENT TOPICAL at 00:00

## 2025-03-03 ASSESSMENT — LOCATION ZONE DERM
LOCATION ZONE: FACE
LOCATION ZONE: FEET
LOCATION ZONE: TRUNK

## 2025-03-03 ASSESSMENT — BSA RASH: BSA RASH: 1

## 2025-03-03 ASSESSMENT — SEVERITY ASSESSMENT 2020: SEVERITY 2020: ALMOST CLEAR

## 2025-03-03 ASSESSMENT — LOCATION SIMPLE DESCRIPTION DERM
LOCATION SIMPLE: RIGHT CHEEK
LOCATION SIMPLE: RIGHT PLANTAR SURFACE
LOCATION SIMPLE: LEFT UPPER BACK
LOCATION SIMPLE: LEFT CHEEK

## 2025-03-03 ASSESSMENT — LOCATION DETAILED DESCRIPTION DERM
LOCATION DETAILED: RIGHT MEDIAL PLANTAR MIDFOOT
LOCATION DETAILED: LEFT MEDIAL MALAR CHEEK
LOCATION DETAILED: RIGHT MEDIAL MALAR CHEEK
LOCATION DETAILED: LEFT INFERIOR UPPER BACK

## 2025-03-03 NOTE — PROCEDURE: SHAVE REMOVAL
Medical Necessity Information: It is in your best interest to select a reason for this procedure from the list below. All of these items fulfill various CMS LCD requirements except the new and changing color options.
Medical Necessity Clause: This procedure was medically necessary because the lesion that was treated was:
Lab: -[id number]
Lab Facility: [id number]
Detail Level: Detailed
Was A Bandage Applied: Yes
Size Of Lesion In Cm (Required): 0.5
X Size Of Lesion In Cm (Optional): 0
Depth Of Shave: dermis
Biopsy Method: Dermablade
Anesthesia Type: 1% lidocaine with 1:100,000 epinephrine
Anesthesia Volume In Cc: 1
Hemostasis: Drysol
Wound Care: Mupirocin
Accession #: [id number]
Render Path Notes In Note?: No
Consent was obtained from the patient. The risks and benefits to therapy were discussed in detail. Specifically, the risks of infection, scarring, bleeding, prolonged wound healing, incomplete removal, allergy to anesthesia, nerve injury and recurrence were addressed. Prior to the procedure, the treatment site was clearly identified and confirmed by the patient. All components of Universal Protocol/PAUSE Rule completed.
Post-Care Instructions: I reviewed with the patient in detail post-care instructions. Patient is to keep the biopsy site dry overnight, and then apply bacitracin twice daily until healed. Patient may apply hydrogen peroxide soaks to remove any crusting.
Notification Instructions: Patient will be notified of pathology results. However, patient instructed to call the office if not contacted within 2 weeks.
Billing Type: Third-Party Bill
Lab: -[id number]
Body Location Override (Optional - Billing Will Still Be Based On Selected Body Map Location If Applicable): right instep
Anesthesia Type: 1% lidocaine without epinephrine
Accession #: [id number]

## 2025-03-03 NOTE — PROCEDURE: PRESCRIPTION MEDICATION MANAGEMENT
Render In Strict Bullet Format?: No
Initiate Treatment: Mupirocin thin layer bid until healed
Plan: Cephalexin 500mg 1 bid 7  in case of infection
Detail Level: Detailed

## 2025-05-05 ENCOUNTER — APPOINTMENT (OUTPATIENT)
Dept: URBAN - METROPOLITAN AREA CLINIC 130 | Facility: CLINIC | Age: 55
Setting detail: DERMATOLOGY
End: 2025-05-05

## 2025-05-05 DIAGNOSIS — D22 MELANOCYTIC NEVI: ICD-10-CM

## 2025-05-05 PROBLEM — D22.9 MELANOCYTIC NEVI, UNSPECIFIED: Status: ACTIVE | Noted: 2025-05-05

## 2025-05-05 PROCEDURE — ? COUNSELING

## 2025-05-05 PROCEDURE — 99212 OFFICE O/P EST SF 10 MIN: CPT

## 2025-05-05 PROCEDURE — ? ADDITIONAL NOTES

## 2025-05-05 PROCEDURE — ? OBSERVATION

## 2025-05-05 ASSESSMENT — LOCATION SIMPLE DESCRIPTION DERM: LOCATION SIMPLE: RIGHT PLANTAR SURFACE

## 2025-05-05 ASSESSMENT — LOCATION ZONE DERM: LOCATION ZONE: FEET

## 2025-05-05 ASSESSMENT — LOCATION DETAILED DESCRIPTION DERM: LOCATION DETAILED: RIGHT ARCH

## 2025-05-05 NOTE — PROCEDURE: OBSERVATION
Body Location Override (Optional - Billing Will Still Be Based On Selected Body Map Location If Applicable): right instep
Detail Level: Detailed
Size Of Lesion In Cm (Optional): 0

## 2025-05-05 NOTE — PROCEDURE: ADDITIONAL NOTES
Detail Level: Detailed
Additional Notes: Deemed clinically clear at this time. Accession #: [id number]
Render Risk Assessment In Note?: no

## (undated) DEVICE — MEDI-VAC YANKAUER SUCTION HANDLE W/BULBOUS TIP: Brand: CARDINAL HEALTH

## (undated) DEVICE — TRAP FLD MINIVAC MEGADYNE 100ML

## (undated) DEVICE — APPL DURAPREP IODOPHOR APL 26ML

## (undated) DEVICE — 3M™ IOBAN™ 2 ANTIMICROBIAL INCISE DRAPE 6640EZ: Brand: IOBAN™ 2

## (undated) DEVICE — SYS CLS SKIN PREMIERPRO EXOFINFUSION 22CM

## (undated) DEVICE — DRSNG BURN ACTICOAT FLEX 7 1X24IN

## (undated) DEVICE — DUAL CUT SAGITTAL BLADE

## (undated) DEVICE — GLV SURG BIOGEL M LTX PF 7 1/2

## (undated) DEVICE — KT DRN EVAC WND PVC PCH WTROC RND 10F400

## (undated) DEVICE — SUT VIC 1 CT1 36IN J947H

## (undated) DEVICE — SOL IRR NACL 0.9PCT 3000ML

## (undated) DEVICE — SYS IRRISEPT JET LAVG CHG .05PCT

## (undated) DEVICE — DRSNG SURESITE WNDW 2.38X2.75

## (undated) DEVICE — DRP IOBAN ANTIMICROB 13X13IN

## (undated) DEVICE — PAD CAST SPECIST STRL 6IN

## (undated) DEVICE — GLV SURG SENSICARE PI PF LF 7 GRN STRL

## (undated) DEVICE — PK KN TOTL 40

## (undated) DEVICE — PREP SOL POVIDONE/IODINE BT 4OZ

## (undated) DEVICE — MAT FLR ABSORBENT LG 4FT 10 2.5FT

## (undated) DEVICE — PREMIUM WET SKIN PREP TRAY: Brand: MEDLINE INDUSTRIES, INC.

## (undated) DEVICE — SUT VIC 0 CT1 36IN UD VCP946H

## (undated) DEVICE — NEEDLE, QUINCKE 22GX3.5": Brand: MEDLINE INDUSTRIES, INC.

## (undated) DEVICE — TRY SKINPREP WET PREM SCRB

## (undated) DEVICE — THE STERILE LIGHT HANDLE COVER IS USED WITH STERIS SURGICAL LIGHTING AND VISUALIZATION SYSTEMS.

## (undated) DEVICE — GLV SURG SENSICARE W/ALOE PF LF 7.5 STRL

## (undated) DEVICE — ST PAD POSTN FM FT W/COHESIVE WRP STRL 1P/U LF

## (undated) DEVICE — BNDG ELAS ELITE V/CLOSE 6IN 5YD LF STRL

## (undated) DEVICE — GLV SURG SENSICARE W/ALOE PF LF 8 STRL

## (undated) DEVICE — TBG PENCL TELESCP MEGADYNE SMOKE EVAC 10FT

## (undated) DEVICE — PICO 7 10CM X 30CM: Brand: PICO™ 7

## (undated) DEVICE — UNDERGLV SURG BIOGEL INDICATOR LF PF 7.5

## (undated) DEVICE — ANTIBACTERIAL UNDYED BRAIDED (POLYGLACTIN 910), SYNTHETIC ABSORBABLE SUTURE: Brand: COATED VICRYL

## (undated) DEVICE — BLD SAW SAG DUALCUT 18X90X1.37MM

## (undated) DEVICE — GLV SURG SENSICARE PI MIC PF SZ7 LF STRL

## (undated) DEVICE — NDL SPINE QUINCKE SHRP/BVL 22G 3.5IN BLK

## (undated) DEVICE — 450 ML BOTTLE OF 0.05% CHLORHEXIDINE GLUCONATE IN 99.95% STERILE WATER FOR IRRIGATION, USP AND APPLICATOR.: Brand: IRRISEPT ANTIMICROBIAL WOUND LAVAGE

## (undated) DEVICE — CVR HNDL LIGHT HARMONYAIR SURG STRL

## (undated) DEVICE — STERILE PATIENT PROTECTIVE PAD FOR IMP® KNEE POSITIONERS & COHESIVE WRAP (10 / CASE): Brand: DE MAYO KNEE POSITIONER®

## (undated) DEVICE — GLV SURG PREMIERPRO ORTHO LTX PF SZ7.5 BRN

## (undated) DEVICE — PAD GRND E/S MEGADYNE MONOPLR 2/PLT W/CORD A/ DISP

## (undated) DEVICE — TP SXN YANKR WO/ VNT CLR LF